# Patient Record
Sex: FEMALE | Race: BLACK OR AFRICAN AMERICAN | NOT HISPANIC OR LATINO | Employment: OTHER | URBAN - METROPOLITAN AREA
[De-identification: names, ages, dates, MRNs, and addresses within clinical notes are randomized per-mention and may not be internally consistent; named-entity substitution may affect disease eponyms.]

---

## 2018-03-17 ENCOUNTER — APPOINTMENT (EMERGENCY)
Dept: RADIOLOGY | Facility: HOSPITAL | Age: 78
DRG: 392 | End: 2018-03-17
Payer: MEDICARE

## 2018-03-17 ENCOUNTER — HOSPITAL ENCOUNTER (INPATIENT)
Facility: HOSPITAL | Age: 78
LOS: 1 days | Discharge: HOME | DRG: 392 | End: 2018-03-19
Attending: EMERGENCY MEDICINE | Admitting: INTERNAL MEDICINE
Payer: MEDICARE

## 2018-03-17 DIAGNOSIS — K21.00 GASTROESOPHAGEAL REFLUX DISEASE WITH ESOPHAGITIS: ICD-10-CM

## 2018-03-17 DIAGNOSIS — I10 HYPERTENSION: ICD-10-CM

## 2018-03-17 DIAGNOSIS — R07.9 CHEST PAIN: Primary | ICD-10-CM

## 2018-03-17 DIAGNOSIS — K20.90 ESOPHAGITIS: ICD-10-CM

## 2018-03-17 PROBLEM — K21.9 GERD (GASTROESOPHAGEAL REFLUX DISEASE): Status: ACTIVE | Noted: 2018-03-17

## 2018-03-17 PROBLEM — I16.0 HYPERTENSIVE URGENCY: Status: ACTIVE | Noted: 2018-03-17

## 2018-03-17 LAB
ALBUMIN SERPL BCP-MCNC: 3.7 G/DL (ref 3.5–5)
ALP SERPL-CCNC: 129 U/L (ref 46–116)
ALT SERPL W P-5'-P-CCNC: 19 U/L (ref 12–78)
ANION GAP SERPL CALCULATED.3IONS-SCNC: 7 MMOL/L (ref 4–13)
AST SERPL W P-5'-P-CCNC: 19 U/L (ref 5–45)
BASOPHILS # BLD AUTO: 0.02 THOUSANDS/ΜL (ref 0–0.1)
BASOPHILS NFR BLD AUTO: 0 % (ref 0–1)
BILIRUB SERPL-MCNC: 0.78 MG/DL (ref 0.2–1)
BUN SERPL-MCNC: 11 MG/DL (ref 5–25)
CALCIUM SERPL-MCNC: 9.2 MG/DL (ref 8.3–10.1)
CHLORIDE SERPL-SCNC: 105 MMOL/L (ref 100–108)
CO2 SERPL-SCNC: 29 MMOL/L (ref 21–32)
CREAT SERPL-MCNC: 0.85 MG/DL (ref 0.6–1.3)
EOSINOPHIL # BLD AUTO: 0.08 THOUSAND/ΜL (ref 0–0.61)
EOSINOPHIL NFR BLD AUTO: 1 % (ref 0–6)
ERYTHROCYTE [DISTWIDTH] IN BLOOD BY AUTOMATED COUNT: 14.3 % (ref 11.6–15.1)
GFR SERPL CREATININE-BSD FRML MDRD: 67 ML/MIN/1.73SQ M
GLUCOSE SERPL-MCNC: 112 MG/DL (ref 65–140)
HCT VFR BLD AUTO: 38.8 % (ref 34.8–46.1)
HGB BLD-MCNC: 12.4 G/DL (ref 11.5–15.4)
LIPASE SERPL-CCNC: 107 U/L (ref 73–393)
LYMPHOCYTES # BLD AUTO: 2.47 THOUSANDS/ΜL (ref 0.6–4.47)
LYMPHOCYTES NFR BLD AUTO: 28 % (ref 14–44)
MCH RBC QN AUTO: 27.7 PG (ref 26.8–34.3)
MCHC RBC AUTO-ENTMCNC: 32 G/DL (ref 31.4–37.4)
MCV RBC AUTO: 87 FL (ref 82–98)
MONOCYTES # BLD AUTO: 0.4 THOUSAND/ΜL (ref 0.17–1.22)
MONOCYTES NFR BLD AUTO: 5 % (ref 4–12)
NEUTROPHILS # BLD AUTO: 5.8 THOUSANDS/ΜL (ref 1.85–7.62)
NEUTS SEG NFR BLD AUTO: 66 % (ref 43–75)
NRBC BLD AUTO-RTO: 0 /100 WBCS
PLATELET # BLD AUTO: 318 THOUSANDS/UL (ref 149–390)
PMV BLD AUTO: 9.4 FL (ref 8.9–12.7)
POTASSIUM SERPL-SCNC: 3.4 MMOL/L (ref 3.5–5.3)
PROT SERPL-MCNC: 8.2 G/DL (ref 6.4–8.2)
RBC # BLD AUTO: 4.47 MILLION/UL (ref 3.81–5.12)
SODIUM SERPL-SCNC: 141 MMOL/L (ref 136–145)
TROPONIN I SERPL-MCNC: <0.02 NG/ML
TROPONIN I SERPL-MCNC: <0.02 NG/ML
WBC # BLD AUTO: 8.79 THOUSAND/UL (ref 4.31–10.16)

## 2018-03-17 PROCEDURE — 99285 EMERGENCY DEPT VISIT HI MDM: CPT

## 2018-03-17 PROCEDURE — 85025 COMPLETE CBC W/AUTO DIFF WBC: CPT | Performed by: EMERGENCY MEDICINE

## 2018-03-17 PROCEDURE — 71046 X-RAY EXAM CHEST 2 VIEWS: CPT

## 2018-03-17 PROCEDURE — 83690 ASSAY OF LIPASE: CPT | Performed by: EMERGENCY MEDICINE

## 2018-03-17 PROCEDURE — 93005 ELECTROCARDIOGRAM TRACING: CPT | Performed by: EMERGENCY MEDICINE

## 2018-03-17 PROCEDURE — 80053 COMPREHEN METABOLIC PANEL: CPT | Performed by: EMERGENCY MEDICINE

## 2018-03-17 PROCEDURE — 74175 CTA ABDOMEN W/CONTRAST: CPT

## 2018-03-17 PROCEDURE — 93005 ELECTROCARDIOGRAM TRACING: CPT

## 2018-03-17 PROCEDURE — 84484 ASSAY OF TROPONIN QUANT: CPT | Performed by: EMERGENCY MEDICINE

## 2018-03-17 PROCEDURE — 71275 CT ANGIOGRAPHY CHEST: CPT

## 2018-03-17 PROCEDURE — 84484 ASSAY OF TROPONIN QUANT: CPT | Performed by: INTERNAL MEDICINE

## 2018-03-17 PROCEDURE — 36415 COLL VENOUS BLD VENIPUNCTURE: CPT

## 2018-03-17 RX ORDER — ACETAMINOPHEN 325 MG/1
650 TABLET ORAL EVERY 6 HOURS PRN
Status: DISCONTINUED | OUTPATIENT
Start: 2018-03-17 | End: 2018-03-19 | Stop reason: HOSPADM

## 2018-03-17 RX ORDER — LABETALOL HYDROCHLORIDE 5 MG/ML
10 INJECTION, SOLUTION INTRAVENOUS ONCE
Status: COMPLETED | OUTPATIENT
Start: 2018-03-17 | End: 2018-03-17

## 2018-03-17 RX ORDER — PANTOPRAZOLE SODIUM 40 MG/1
40 TABLET, DELAYED RELEASE ORAL
Status: DISCONTINUED | OUTPATIENT
Start: 2018-03-18 | End: 2018-03-19 | Stop reason: HOSPADM

## 2018-03-17 RX ORDER — POTASSIUM CHLORIDE 20 MEQ/1
40 TABLET, EXTENDED RELEASE ORAL ONCE
Status: COMPLETED | OUTPATIENT
Start: 2018-03-17 | End: 2018-03-17

## 2018-03-17 RX ORDER — LABETALOL HYDROCHLORIDE 5 MG/ML
10 INJECTION, SOLUTION INTRAVENOUS EVERY 6 HOURS PRN
Status: DISCONTINUED | OUTPATIENT
Start: 2018-03-17 | End: 2018-03-19

## 2018-03-17 RX ORDER — LABETALOL HYDROCHLORIDE 5 MG/ML
10 INJECTION, SOLUTION INTRAVENOUS ONCE
Status: DISCONTINUED | OUTPATIENT
Start: 2018-03-17 | End: 2018-03-19

## 2018-03-17 RX ORDER — LISINOPRIL 10 MG/1
10 TABLET ORAL DAILY
Status: ON HOLD | COMMUNITY
End: 2018-03-19

## 2018-03-17 RX ORDER — MAGNESIUM HYDROXIDE/ALUMINUM HYDROXICE/SIMETHICONE 120; 1200; 1200 MG/30ML; MG/30ML; MG/30ML
20 SUSPENSION ORAL ONCE
Status: COMPLETED | OUTPATIENT
Start: 2018-03-17 | End: 2018-03-17

## 2018-03-17 RX ORDER — LISINOPRIL 10 MG/1
10 TABLET ORAL DAILY
Status: DISCONTINUED | OUTPATIENT
Start: 2018-03-18 | End: 2018-03-19 | Stop reason: HOSPADM

## 2018-03-17 RX ORDER — MAGNESIUM HYDROXIDE/ALUMINUM HYDROXICE/SIMETHICONE 120; 1200; 1200 MG/30ML; MG/30ML; MG/30ML
15 SUSPENSION ORAL EVERY 4 HOURS PRN
Status: DISCONTINUED | OUTPATIENT
Start: 2018-03-17 | End: 2018-03-19 | Stop reason: HOSPADM

## 2018-03-17 RX ORDER — NITROGLYCERIN 0.4 MG/1
0.4 TABLET SUBLINGUAL ONCE
Status: COMPLETED | OUTPATIENT
Start: 2018-03-17 | End: 2018-03-17

## 2018-03-17 RX ORDER — ONDANSETRON 2 MG/ML
4 INJECTION INTRAMUSCULAR; INTRAVENOUS EVERY 6 HOURS PRN
Status: DISCONTINUED | OUTPATIENT
Start: 2018-03-17 | End: 2018-03-19 | Stop reason: HOSPADM

## 2018-03-17 RX ADMIN — LIDOCAINE HYDROCHLORIDE 15 ML: 20 SOLUTION ORAL; TOPICAL at 17:02

## 2018-03-17 RX ADMIN — POTASSIUM CHLORIDE 40 MEQ: 1500 TABLET, EXTENDED RELEASE ORAL at 20:42

## 2018-03-17 RX ADMIN — ALUMINUM HYDROXIDE, MAGNESIUM HYDROXIDE, AND SIMETHICONE 20 ML: 200; 200; 20 SUSPENSION ORAL at 17:02

## 2018-03-17 RX ADMIN — LABETALOL 20 MG/4 ML (5 MG/ML) INTRAVENOUS SYRINGE 10 MG: at 19:18

## 2018-03-17 RX ADMIN — NITROGLYCERIN 0.4 MG: 0.4 TABLET SUBLINGUAL at 14:32

## 2018-03-17 RX ADMIN — IOHEXOL 100 ML: 350 INJECTION, SOLUTION INTRAVENOUS at 15:25

## 2018-03-17 NOTE — PROGRESS NOTES
[facility] Senior Admission Note   Unit/Bed # @DBLINK ([facility],[address])@ Encounter: [id number]  SOD Team A          Valery Cabral 78 y o  female [id number]       Patient seen and examined  Reviewed H&P per Dr Linda Means  Agree with the assessment and plan  Patient is a 72-year-old female with past medical history of hypertension  Patient supposedly be taking lisinopril however she is not compliant  She moved here from [place] at this time does not have a PCP  She is interested to establish care with Dr Kings Price  This morning patient started to have indigestion together with chest pain  She says that her indigestion usually resolves with aspirin but not at this time  At this time it was different and she was worried that it might be related to her heart  She came to the ED for evaluation  In the ED, EKG with nonspecific ST changes, CTA dissection protocol - There is no aortic dissection or aneurysm  There is no pericardial effusion  The esophagus appears abnormal with a thickened hazy appearing wall and surrounding mild adenopathy  This might indicate esophagitis which could be infectious or due to reflux  Esophageal neoplasm is not excluded  Further dedicated workup suggested  Colonic diverticulosis  Small hepatic hypodensities which are probably cysts  Labs - potassium 3 4 otherwise normal        Assessment/Plan: Active Problems:    Hypertensive urgency    Chest pain    Hypertension    GERD (gastroesophageal reflux disease)    Esophagitis       Chest pain most likely 2/2 GERD, indigestion, esophagitis vs  ACS rule out less likely musculoskeletal (patient nontender to chest palpation)  - CTA dissection protocol - There is no aortic dissection or aneurysm  There is no pericardial effusion  The esophagus appears abnormal with a thickened hazy appearing wall and surrounding mild adenopathy  This might indicate esophagitis which could be infectious or due to reflux  Esophageal neoplasm is not excluded    - Mylanta p r n  for indigestion  - follow-up on troponins, lipid panel, A1c, TSH, EKG in a m , telemetry monitoring    Hypertension - patient should be taking lisinopril as an outpatient, however she is noncompliant  - continue lisinopril 10 mg daily    Hypertensive urgency - on admission patient's /102  - labetalol p r n  for BP greater than 180    Esophagitis per CT - patient was started on Protonix 40 mg daily  - GI consulted, spoke with GI attending- Dr Dario Conklin, patient will be scheduled for EGD on Monday      Disposition:  OBSERVATION    Expected LOS: <2 Halle Georges MD

## 2018-03-17 NOTE — ED ATTENDING ATTESTATION
Chris Haro MD, saw and evaluated the patient  I have discussed the patient with the resident/non-physician practitioner and agree with the resident's/non-physician practitioner's findings, Plan of Care, and MDM as documented in the resident's/non-physician practitioner's note, except where noted  All available labs and Radiology studies were reviewed  At this point I agree with the current assessment done in the Emergency Department  I have conducted an independent evaluation of this patient a history and physical is as follows:      Critical Care Time  CritCare Time    Procedures     77 yo female c/o chest pain and high bp at home  Pt noncompliant with her meds  Pt states today she woke up and developed chest pain with radiation to jaw for a moment  No n/v/diaphoresis  No palpitations  Pt thinks it may be similar to her reflux  Pt took asa with worsening of pain  Pt with persistent pain  No headache, no abdominal pain, no urinary complaints  Quit smoking 5 years ago, pmh obese, htn, echo many years ago  Vss, afebrile, hypertensive, lungs mild crackles at base of lungs, rrr, abdomen soft nontender, no pedal edema  Cardiac workup, lipase, nitro, ekg, cxr

## 2018-03-17 NOTE — H&P
INTERNAL MEDICINE HISTORY AND PHYSICAL  CW2 210-02 SOD Team A    NAME: Valery Cabral  AGE: 78 y o  SEX: female  : 1940   MRN: [id number]  ENCOUNTER: [id number]    DATE: 3/17/2018  TIME: 6:55 PM    Primary Care Physician: No primary care provider on file  Admitting Provider: Janeth Negro MD    Chief complaint:  Chest pain and high blood pressure recorded at home    History of Present Illness     Valery Cabral is a 78 y o  female with past medical history of GERD and hypertension with medication noncompliance who woke up this morning developed chest pain with radiation to jaw  Patient has never felt these symptoms before  Patient notes that she had infrequent indigestion but never took medicines for that  Patient notes that she can walk only a few blocks before having to stop due to shortness of breath  Patient can climb stairs because of osteoarthritis in her knees  Patient did not take her blood pressure medication because she believes she did not needed  Patient had an echo done in 2016 unclear exactly what was noted but patient notes an enlarged heart  Patient measured her blood pressure at home and noted to be 483X systolic blood pressure  Patient denied any nausea, vomiting, diaphoresis, palpitations, headache, fevers, chills at that time  Patient took aspirin at home with no relief and persistent pain  Patient quit smoking 5 years ago  In the Ed:  - heart score ~4  - vitals - heart rate 70-80s, respiration rate 18, blood pressure 140s-200/90s, SpO2 96% +  Mylanta 1 time  Viscous Lidocaine  Sublingual nitro  CMP - potassium 3 4, alk-phos 129  CBC - largely within normal limits  - lipase within normal limits  - troponin x1 within normal limits  - EKG - poor EKG  Consider repeating  No signs of ACS at this moment  - CTA Dissection protocol - Soft tissues appeared to be thickened with possible surrounding mild adenopathy  Could not exclude esophageal neoplasm    Slight asymmetry of the inner left breast but patient reports to have had a history of breast surgery  Should be clarified with patient  Colonic diverticulosis  Small hepatic hypodensities with a probable cyst   - CXR - no acute cardiopulmonary disease    Review of Systems   Review of Systems   Constitutional: Negative for chills, fatigue and fever  HENT: Negative for drooling, facial swelling and sore throat  Eyes: Negative for visual disturbance  Respiratory: Positive for chest tightness  Negative for cough, choking, shortness of breath, wheezing and stridor  Cardiovascular: Negative for chest pain and leg swelling  Gastrointestinal: Negative for abdominal distention, abdominal pain, anal bleeding, blood in stool, constipation, diarrhea and nausea  Indigestion, reflux, worse when lying down   Genitourinary: Negative for difficulty urinating, dysuria and flank pain  Musculoskeletal: Negative for arthralgias  Skin: Negative  All other systems reviewed and are negative  Past Medical History     Past Medical History:   Diagnosis Date    Hypertension        Past Surgical History     Past Surgical History:   Procedure Laterality Date    BREAST LUMPECTOMY      DENTAL SURGERY      TONSILLECTOMY         Social History     History   Alcohol Use No     History   Drug Use No     History   Smoking Status    Former Smoker   Smokeless Tobacco    Never Used       Family History   History reviewed  No pertinent family history  Medications Prior to Admission     Prior to Admission medications    Medication Sig Start Date End Date Taking?  Authorizing Provider   lisinopril (ZESTRIL) 10 mg tablet Take 10 mg by mouth daily   Yes Historical Provider, MD       Allergies   No Known Allergies    Objective     Vitals:    03/17/18 1511 03/17/18 1615 03/17/18 1700 03/17/18 1748   BP: 146/99 167/78 162/95 (!) 200/95   BP Location: Right arm Right arm Right arm Right arm   Pulse: 78 84 76 86   Resp: 18 18 18 18   Temp: TempSrc:       SpO2: 98% 96% 97% 97%   Weight:       Height:         Body mass index is 29 86 kg/m²  No intake or output data in the 24 hours ending 03/17/18 1855  Invasive Devices     Peripheral Intravenous Line            Peripheral IV 03/17/18 Left Antecubital less than 1 day                Physical Exam  Physical Exam   Constitutional: She is oriented to person, place, and time  She appears well-nourished  HENT:   Head: Normocephalic and atraumatic  Eyes: EOM are normal  Pupils are equal, round, and reactive to light  Cardiovascular: Normal rate and regular rhythm  Murmur heard  Pulmonary/Chest: Effort normal and breath sounds normal  No respiratory distress  She has no wheezes  She has no rales  She exhibits tenderness (mid sternal, non radiating)  Abdominal: Soft  Bowel sounds are normal  She exhibits no distension  There is no tenderness  There is no rebound  Musculoskeletal: She exhibits no edema  Neurological: She is alert and oriented to person, place, and time  No cranial nerve deficit  Skin: Skin is warm  No rash noted  No erythema  Psychiatric: She has a normal mood and affect  Her behavior is normal    Vitals reviewed  Lab Results: I have personally reviewed pertinent reports      CBC:     Results from last 7 days  Lab Units 03/17/18  1405   WBC Thousand/uL 8 79   RBC Million/uL 4 47   HEMOGLOBIN g/dL 12 4   HEMATOCRIT % 38 8   MCV fL 87   MCH pg 27 7   MCHC g/dL 32 0   RDW % 14 3   MPV fL 9 4   PLATELETS Thousands/uL 318   NRBC AUTO /100 WBCs 0   NEUTROS PCT % 66   LYMPHS PCT % 28   MONOS PCT % 5   EOS PCT % 1   BASOS PCT % 0   NEUTROS ABS Thousands/µL 5 80   LYMPHS ABS Thousands/µL 2 47   MONOS ABS Thousand/µL 0 40   EOS ABS Thousand/µL 0 08   , Chemistry Profile:     Results from last 7 days  Lab Units 03/17/18  1405   SODIUM mmol/L 141   POTASSIUM mmol/L 3 4*   CHLORIDE mmol/L 105   CO2 mmol/L 29   ANION GAP mmol/L 7   BUN mg/dL 11   CREATININE mg/dL 0 85   GLUCOSE RANDOM mg/dL 112   CALCIUM mg/dL 9 2   AST U/L 19   ALT U/L 19   ALK PHOS U/L 129*   TOTAL PROTEIN g/dL 8 2   BILIRUBIN TOTAL mg/dL 0 78   EGFR ml/min/1 73sq m 67   , Coagulation Studies:   , Cardiac Studies:     Results from last 7 days  Lab Units 03/17/18  1405   TROPONIN I ng/mL <0 02   , Additional Labs:     Results from last 7 days  Lab Units 03/17/18  1405   LIPASE u/L 107   , iSTAT CHEM 8:     Results from last 7 days  Lab Units 03/17/18  1405   EGFR ml/min/1 73sq m 67   GLUCOSE RANDOM mg/dL 112   HEMOGLOBIN g/dL 12 4   , ABG:   , Toxicology:   , Last A1C/Lipid Panel/Thyroid Panel: No results found for: HGBA1C, TRIG, CHOL, HDL, LDLCALC, [id number], T3FREE, U3ZAGTK, FREET4    Imaging: I have personally reviewed pertinent films in PACS  X-ray Chest 2 Views    Result Date: 3/17/2018  Narrative: CHEST INDICATION:   chest pain  Hypertension COMPARISON:  None EXAM PERFORMED/VIEWS:  XR CHEST PA & LATERAL  The frontal view was performed utilizing dual energy radiographic technique  FINDINGS: Cardiomediastinal silhouette appears unremarkable  The lungs are clear  No pneumothorax or pleural effusion  Osseous structures appear within normal limits for patient age  Impression: No acute cardiopulmonary disease  Workstation performed: [id number]     Cta Dissection Protocol Chest And Abdomen    Result Date: 3/17/2018  Narrative: CTA - CHEST AND ABDOMEN  - WITHOUT AND WITH IV CONTRAST INDICATION:   htn, cp concern for pericardial effusion on bedisde echo  COMPARISON:  None  TECHNIQUE: CT examination of the chest and abdomen was performed both prior to and after the administration of intravenous contrast   Thin section angiographic arterial phase post contrast technique was used in order to evaluate for aortic dissection  3D reformatted images and volume rendering were performed on an independent workstation    Additionally, axial, sagittal, and coronal 2D reformatted images were created from the source data and submitted for interpretation  Radiation dose length product (DLP) for this visit:  2134 64 mGy-cm   This examination, like all CT scans performed in the [facility], was performed utilizing techniques to minimize radiation dose exposure, including the use of iterative reconstruction and automated exposure control  IV Contrast:  100 mL of iohexol (OMNIPAQUE) Enteric Contrast: Enteric contrast was not administered  FINDINGS: AORTA: There is no aortic dissection or intramural hematoma  There is no aortic aneurysm  There is atherosclerotic calcification of the aorta wall  Caliber is normal   No visible aortic valvular calcifications  Branch vessels at the arch and the major  branch vessels in the abdomen appear patent and within normal limits of caliber  There appear to be 2 left renal arteries and one right renal artery  CHEST LUNGS:  There is very mild pulmonary emphysema  There is a 2 or 3 mm subpleural nodule in the right upper lobe on image 25 series 3  There is a 1 mm nodule on image 30 in the right upper lobe laterally  There is a 1 or 2 mm nodule in the right upper lobe on image 37 anteriorly  There are no suspicious infiltrates to suggest pneumonia  No visible airway obstructions  PLEURA:  Unremarkable  HEART/PULMONARY ARTERIAL TREE:  There is no pericardial effusion  Heart size is within normal limits  The pulmonary arterial tree appears within normal limits  MEDIASTINUM AND JUAN:  The esophagus appears thick-walled  Correlate for any history of reflux or esophagitis  The wall also has a slightly hazy outer margin and there is minimal prominence of some lymph nodes around the esophagus  While probably less  likely than esophagitis, the possibility of esophageal neoplasm cannot be excluded based on this exam and further workup is therefore suggested    Upper endoscopy would probably be the most sensitive test  CHEST WALL AND LOWER NECK:   There seems to be slight asymmetric prominence of tissue within the medial left breast compared with the right, without any definitive evidence of focal mass  Patient does not appear to have any prior mammograms at [facility]  If she has not had at least for yearly screening study, mammography is recommended on a nonemergent basis  ABDOMEN LIVER/BILIARY TREE:  Small round low-density hepatic lesions in the left and right lobes have appearance favoring cysts  The left-sided finding is 11 mm and the right lobe finding is 7-8 mm  GALLBLADDER:  No calcified gallstones  No pericholecystic inflammatory change  SPLEEN:  Unremarkable  PANCREAS:  Unremarkable  ADRENAL GLANDS:  There is minimal nodular thickening of the left adrenal gland  It measures up to 8 mm diameter  The density is -4 Hounsfield units on the noncontrast portion of the exam, diagnostic of benign adenoma  KIDNEYS/URETERS:  Unremarkable  No hydronephrosis  VISUALIZED STOMACH AND BOWEL:  There is colonic diverticulosis  VISUALIZED ABDOMINOPELVIC CAVITY:  No ascites or free intraperitoneal air  No lymphadenopathy  ABDOMINAL WALL:  Unremarkable  OSSEOUS STRUCTURES:  No acute fracture or destructive osseous lesion  Impression: There is no aortic dissection or aneurysm  There is no pericardial effusion  The esophagus appears abnormal with a thickened hazy appearing wall and surrounding mild adenopathy  This might indicate esophagitis which could be infectious or due to reflux  Esophageal neoplasm is not excluded  Further dedicated workup suggested  Slight asymmetry of the inner left breast   Consider nonemergent follow-up mammogram  Colonic diverticulosis  Small hepatic hypodensities which are probably cysts  Workstation performed: [id number]       Urinalysis:       Invalid input(s): URIBILINOGEN     Urine Micro:        EKG, Pathology, and Other Studies: I have personally reviewed pertinent reports        Medications given in Emergency Department     Medication Administration - last 24 hours from 03/16/2018 1855 to 03/17/2018 1855       Date/Time Order Dose Route Action Action by     03/17/2018 1432 nitroglycerin (NITROSTAT) SL tablet 0 4 mg 0 4 mg Sublingual Given Louie Garcia RN     03/17/2018 1514 labetalol (NORMODYNE) injection 10 mg 0 mg Intravenous Hold Louie Garcia RN     03/17/2018 1525 iohexol (OMNIPAQUE) 350 MG/ML injection (MULTI-DOSE) 100 mL 100 mL Intravenous Given Breanne Flatten     03/17/2018 1702 lidocaine viscous (XYLOCAINE) 2 % mucosal solution 15 mL 15 mL Swish & Spit Given Louie Garcia RN     03/17/2018 1702 aluminum-magnesium hydroxide-simethicone (MYLANTA) 200-200-20 mg/5 mL oral suspension 20 mL 20 mL Oral Given Louie Garcia RN          Assessment and Plan   There is no problem list on file for this patient  Chest pain - likely secondary to GERD/esophagitis  Troponin x1 negative  EKG although poor was not specific for ACS  Cta dissection protocol - no aortic dissection or an aneurysm  No pericardial effusion  Soft tissues appeared to be thickened with possible surrounding mild adenopathy  Could not exclude esophageal neoplasm  Slight asymmetry of the inner left breast but patient reports to have had a history of breast surgery  Should be clarified with patient  Colonic diverticulosis    Small hepatic hypodensities with a probable cyst   - troponin repeat  - consider repeat Ekg  - nitrostat  - protonix 40mg daily, mylanta prn  - zofran for nausea  - mylanta prn  - f/u CBC, CMP, mg, phosphorus  - spoke to Gi, regarding endoscopy and will scope on Monday  - NPO tomorrow night    Hypertensive Urgency - non compliant with   - continue with home lisinopril 10 mg daily  - labetalol Q 6 p r n  for blood pressure greater than 180    Other  - TSH  - Hga1c  - Kdur 40 1x    Code Status: Level 1 - Full Code  VTE Pharmacologic Prophylaxis: Enoxaparin (Lovenox)   VTE Mechanical Prophylaxis: sequential compression device  Admission Status: OBSERVATION    Admission Time  I spent 30 minutes admitting the patient  This involved direct patient contact where I performed a full history and physical, reviewing previous records, and reviewing laboratory and other diagnostic studies      Kaleb Lyle MD  Internal Medicine  PGY-1

## 2018-03-17 NOTE — ED PROVIDER NOTES
History  Chief Complaint   Patient presents with    Chest Pain     Patient reports sternal chest pain starting this morning that woke her up  Patient is 77 yo woman with a pmh of htn who presents for evaluation of chest pain  She states that the pain started at 7:15 this am after she woke up  The pain is located retrosternal  It radiated briefly to the left jaw but subsided  She took a 324 aspirin as she was concerned for a heart attack  She chewed and swallowed the pill  She states that the pain worsened at this time  She has had reflux in the past and states that this pain is similar in character  She came to the ER today because she has not had symptoms this severe in the past  She denies sob, nausea/vomiting, diaphoresis, fever, chills  She was at rest when the symptoms started  She denies recent illness, diarrhea, headache  Patient is former smoker, she has htn that is poorly controlled  She hasn't seen a doctor on ~2 years  History provided by:  Patient and relative      Prior to Admission Medications   Prescriptions Last Dose Informant Patient Reported? Taking?   lisinopril (ZESTRIL) 10 mg tablet 3/17/2018 at 1100  Yes Yes   Sig: Take 10 mg by mouth daily      Facility-Administered Medications: None       Past Medical History:   Diagnosis Date    Esophagitis     GERD (gastroesophageal reflux disease)     Hypertension        Past Surgical History:   Procedure Laterality Date    BREAST LUMPECTOMY      DENTAL SURGERY      ESOPHAGOGASTRODUODENOSCOPY N/A 3/19/2018    Procedure: ESOPHAGOGASTRODUODENOSCOPY (EGD); Surgeon: Lien Anthony MD;  Location: [facility]; Service: Gastroenterology    TONSILLECTOMY         Family History   Problem Relation Age of Onset    No Known Problems Mother     No Known Problems Father      I have reviewed and agree with the history as documented      Social History   Substance Use Topics    Smoking status: Former Smoker    Smokeless tobacco: Never Used Comment: quit 5 years ago    Alcohol use No        Review of Systems   Constitutional: Negative for appetite change, chills, diaphoresis, fatigue and fever  HENT: Negative for congestion, rhinorrhea and sore throat  Respiratory: Negative for cough, shortness of breath, wheezing and stridor  Cardiovascular: Positive for chest pain  Negative for palpitations and leg swelling  Gastrointestinal: Negative for abdominal distention, abdominal pain, constipation, diarrhea, nausea and vomiting  Endocrine: Negative for polydipsia and polyuria  Genitourinary: Negative for dysuria and hematuria  Musculoskeletal: Negative for back pain, neck pain and neck stiffness  Skin: Negative for pallor, rash and wound  Neurological: Negative for dizziness, light-headedness and headaches  Psychiatric/Behavioral: Negative for behavioral problems and confusion  Physical Exam  ED Triage Vitals   Temperature Pulse Respirations Blood Pressure SpO2   03/17/18 1356 03/17/18 1356 03/17/18 1356 03/17/18 1356 03/17/18 1356   97 6 °F (36 4 °C) 104 18 (!) 211/102 99 %      Temp Source Heart Rate Source Patient Position - Orthostatic VS BP Location FiO2 (%)   03/17/18 1356 03/17/18 1356 03/17/18 1356 03/17/18 1356 --   Tympanic Monitor Lying Right arm       Pain Score       03/17/18 1353       6           Orthostatic Vital Signs  Vitals:    03/19/18 0722 03/19/18 1424 03/19/18 1539 03/19/18 1552   BP: (!) 180/90 (!) 180/84 95/50 122/64   Pulse: 73 78 81 70   Patient Position - Orthostatic VS: Sitting          Physical Exam   Constitutional: She is oriented to person, place, and time  She appears well-developed and well-nourished  No distress  Obese woman in no acute distress  HENT:   Head: Normocephalic and atraumatic  Mouth/Throat: Oropharynx is clear and moist and mucous membranes are normal    Eyes: Conjunctivae are normal  No scleral icterus  Neck: Normal range of motion  Neck supple     Cardiovascular: Normal rate, regular rhythm, normal heart sounds and intact distal pulses  Exam reveals no gallop  No murmur heard  Pulmonary/Chest: Effort normal  No respiratory distress  She has no wheezes  She has rales (mild bibasilar)  She exhibits no tenderness  Abdominal: Soft  Bowel sounds are normal  She exhibits no distension  There is no tenderness  Musculoskeletal: Normal range of motion  She exhibits no edema, tenderness or deformity  Neurological: She is alert and oriented to person, place, and time  Skin: Skin is warm and dry  No rash noted  She is not diaphoretic  No erythema  No pallor  Psychiatric: She has a normal mood and affect  Her behavior is normal    Nursing note and vitals reviewed        ED Medications  Medications   nitroglycerin (NITROSTAT) SL tablet 0 4 mg (0 4 mg Sublingual Given 3/17/18 1432)   iohexol (OMNIPAQUE) 350 MG/ML injection (MULTI-DOSE) 100 mL (100 mL Intravenous Given 3/17/18 1525)   lidocaine viscous (XYLOCAINE) 2 % mucosal solution 15 mL (15 mL Swish & Spit Given 3/17/18 1702)   aluminum-magnesium hydroxide-simethicone (MYLANTA) 200-200-20 mg/5 mL oral suspension 20 mL (20 mL Oral Given 3/17/18 1702)   labetalol (NORMODYNE) injection 10 mg (10 mg Intravenous Given 3/17/18 1918)   potassium chloride (K-DUR,KLOR-CON) CR tablet 40 mEq (40 mEq Oral Given 3/17/18 2042)       Diagnostic Studies  Results Reviewed     Procedure Component Value Units Date/Time    CBC and differential [[id number]]  (Normal) Collected:  03/17/18 1405    Lab Status:  Final result Specimen:  Blood from Arm, Left Updated:  03/17/18 1510     WBC 8 79 Thousand/uL      RBC 4 47 Million/uL      Hemoglobin 12 4 g/dL      Hematocrit 38 8 %      MCV 87 fL      MCH 27 7 pg      MCHC 32 0 g/dL      RDW 14 3 %      MPV 9 4 fL      Platelets 875 Thousands/uL      nRBC 0 /100 WBCs      Neutrophils Relative 66 %      Lymphocytes Relative 28 %      Monocytes Relative 5 %      Eosinophils Relative 1 %      Basophils Relative 0 %      Neutrophils Absolute 5 80 Thousands/µL      Lymphocytes Absolute 2 47 Thousands/µL      Monocytes Absolute 0 40 Thousand/µL      Eosinophils Absolute 0 08 Thousand/µL      Basophils Absolute 0 02 Thousands/µL     Comprehensive metabolic panel [[id number]]  (Abnormal) Collected:  03/17/18 1405    Lab Status:  Final result Specimen:  Blood from Arm, Left Updated:  03/17/18 1449     Sodium 141 mmol/L      Potassium 3 4 (L) mmol/L      Chloride 105 mmol/L      CO2 29 mmol/L      Anion Gap 7 mmol/L      BUN 11 mg/dL      Creatinine 0 85 mg/dL      Glucose 112 mg/dL      Calcium 9 2 mg/dL      AST 19 U/L      ALT 19 U/L      Alkaline Phosphatase 129 (H) U/L      Total Protein 8 2 g/dL      Albumin 3 7 g/dL      Total Bilirubin 0 78 mg/dL      eGFR 67 ml/min/1 73sq m     Narrative:         National Kidney Disease Education Program recommendations are as follows:  GFR calculation is accurate only with a steady state creatinine  Chronic Kidney disease less than 60 ml/min/1 73 sq  meters  Kidney failure less than 15 ml/min/1 73 sq  meters  Lipase [[id number]]  (Normal) Collected:  03/17/18 1405    Lab Status:  Final result Specimen:  Blood from Arm, Left Updated:  03/17/18 1449     Lipase 107 u/L     Troponin I [[id number]]  (Normal) Collected:  03/17/18 1405    Lab Status:  Final result Specimen:  Blood from Arm, Left Updated:  03/17/18 1448     Troponin I <0 02 ng/mL     Narrative:         [employer] Chemistry analyzer 99% cutoff is > 0 04 ng/mL in network labs    o cTnI 99% cutoff is useful only when applied to patients in the clinical setting of myocardial ischemia  o cTnI 99% cutoff should be interpreted in the context of clinical history, ECG findings and possibly cardiac imaging to establish correct diagnosis  o cTnI 99% cutoff may be suggestive but clearly not indicative of a coronary event without the clinical setting of myocardial ischemia                   CTA dissection protocol chest and abdomen   Final Result by Taiwo Wright MD (03/17 7725)      There is no aortic dissection or aneurysm  There is no pericardial effusion  The esophagus appears abnormal with a thickened hazy appearing wall and surrounding mild adenopathy  This might indicate esophagitis which could be infectious or due to reflux  Esophageal neoplasm is not excluded  Further dedicated workup suggested  Slight asymmetry of the inner left breast   Consider nonemergent follow-up mammogram       Colonic diverticulosis  Small hepatic hypodensities which are probably cysts  Workstation performed: [id number]         X-ray chest 2 views   Final Result by Merna Chung MD (03/17 1724)      No acute cardiopulmonary disease              Workstation performed: [id number]               Procedures  ECG 12 Lead Documentation  Date/Time: 3/17/2018 3:00 PM  Performed by: Elvis Lamb  Authorized by: Ksenia Sanford     Indications / Diagnosis:  Chest pain  ECG reviewed by me, the ED Provider: yes    Patient location:  ED  Previous ECG:     Previous ECG:  Unavailable    Comparison to cardiac monitor: Yes    Interpretation:     Interpretation: non-specific    Rate:     ECG rate:  88  Rhythm:     Rhythm: A-V block    Ectopy:     Ectopy: none    QRS:     QRS axis:  Normal  Conduction:     Conduction: abnormal      Abnormal conduction: 1st degree    ST segments:     ST segments:  Non-specific  T waves:     T waves: normal            Phone Consults  ED Phone Contact    ED Course  ED Course          HEART Risk Score    Flowsheet Row Most Recent Value   History  1 Filed at: 03/17/2018 1422   ECG  1 Filed at: 03/17/2018 1422   Age  2 Filed at: 03/17/2018 1422   Risk Factors  1 Filed at: 03/17/2018 1422   Troponin  No data   Heart Score Risk Calculator   History  1 Filed at: 03/17/2018 1422   ECG  1 Filed at: 03/17/2018 1422   Age  2 Filed at: 03/17/2018 1422   Risk Factors  1 Filed at: 03/17/2018 1422   Troponin  No data   HEART Score  No data   HEART Score  No data        Identification of Seniors at Risk    Flowsheet Row Most Recent Value   (ISAR) Identification of Seniors at Risk   Before the illness or injury that brought you to the Emergency, did you need someone to help you on a regular basis? 0 Filed at: 03/17/2018 1356   In the last 24 hours, have you needed more help than usual?  0 Filed at: 03/17/2018 1356   Have you been hospitalized for one or more nights during the past 6 months? 0 Filed at: 03/17/2018 1356   In general, do you see well?  0 Filed at: 03/17/2018 1356   In general, do you have serious problems with your memory? 0 Filed at: 03/17/2018 1356   Do you take more than three different medications every day? 1 Filed at: 03/17/2018 1356   ISAR Score  1 Filed at: 03/17/2018 1356                          MDM  Number of Diagnoses or Management Options  Chest pain: new and requires workup  Esophagitis: new and requires workup  Hypertension: new and requires workup  Diagnosis management comments: 79 yo woman presents with retrosternal chest pain  Patient took aspirin prior to presentation presenting with worsening of pain  Patient hypertensive to 435 systolic on exam  Will give 0 4 SL nitroglycerin to treat htn as well possible anginal pain  Will obtain cbc, cmp, lipase, ekg, cxr, troponin  Ddx includes ACS, dissection ,esophageal etiologies  Will obtain CTA dissection study  Study shows esophageal wall thickening consistent with esophagitis & cannot rule out esophageal malignancy  EKG, troponin, cxr unremarkable  This is likely esophageal in nature but patient has a heart score of 5 with poor medication compliance  Patient will be admitted to [facility] for ACS rule out and further work-up for esophagitis  Patient given lidocaine/mylanta GI cocktail with mild resolution of symptoms          Amount and/or Complexity of Data Reviewed  Clinical lab tests: ordered and reviewed  Tests in the radiology section of CPT®: ordered and reviewed  Tests in the medicine section of CPT®: ordered and reviewed  Decide to obtain previous medical records or to obtain history from someone other than the patient: yes  Obtain history from someone other than the patient: yes  Review and summarize past medical records: yes  Independent visualization of images, tracings, or specimens: yes    Risk of Complications, Morbidity, and/or Mortality  Presenting problems: low  Diagnostic procedures: minimal  Management options: minimal    Patient Progress  Patient progress: stable    CritCare Time    Disposition  Final diagnoses:   Chest pain   Hypertension   Esophagitis     Time reflects when diagnosis was documented in both MDM as applicable and the Disposition within this note     Time User Action Codes Description Comment    3/17/2018  5:37 PM Celine Vuong Add [R07 9] Chest pain     3/17/2018  5:37 PM Celine Vuong Add [I10] Hypertension     3/17/2018  7:18 PM Anne Castellani Modify [R07 9] Chest pain     3/17/2018  7:18 PM Havranova, [address] Hypertension     3/17/2018  7:18 PM Anne Castellani Add [K20 9] Esophagitis     3/17/2018  7:18 PM Anne Castellani Modify [R07 9] Chest pain     3/17/2018  7:18 PM Havranova, [address] Hypertension     3/17/2018  7:18 PM Anne Castellani Modify [R07 9] Chest pain     3/17/2018  7:18 PM Havranova, [address] Hypertension     3/19/2018  9:08 AM Elsa Antelope Add [K21 0] Gastroesophageal reflux disease with esophagitis     3/19/2018  9:08 AM Elsa Antelope Modify [K21 0] Gastroesophageal reflux disease with esophagitis       ED Disposition     ED Disposition Condition Comment    Admit  Case was discussed with [facility]  and the patient's admission status was agreed to be Admission Status: observation status to the service of Dr Luke Collet          Follow-up Information     Follow up With Specialties Details Why Contact Ross Rider MD Internal Medicine Schedule an appointment as soon as possible for a visit in 1 week(s)  [address] [facility]  [phone]          Discharge Medication List as of 3/19/2018  4:57 PM      START taking these medications    Details   amLODIPine (NORVASC) 5 mg tablet Take 1 tablet (5 mg total) by mouth every morning, Starting Mon 3/19/2018, Print      pantoprazole (PROTONIX) 40 mg tablet Take 1 tablet (40 mg total) by mouth daily in the early morning, Starting Tue 3/20/2018, Print         CONTINUE these medications which have CHANGED    Details   lisinopril (ZESTRIL) 10 mg tablet Take 1 tablet (10 mg total) by mouth daily with dinner, Starting Mon 3/19/2018, Print           No discharge procedures on file  ED Provider  Attending physically available and evaluated Gaurav Loyola I managed the patient along with the ED Attending      Electronically Signed by         Ana Garcia MD  03/21/18 1200

## 2018-03-18 LAB
ALBUMIN SERPL BCP-MCNC: 3.3 G/DL (ref 3.5–5)
ALP SERPL-CCNC: 109 U/L (ref 46–116)
ALT SERPL W P-5'-P-CCNC: 15 U/L (ref 12–78)
ANION GAP SERPL CALCULATED.3IONS-SCNC: 7 MMOL/L (ref 4–13)
AST SERPL W P-5'-P-CCNC: 14 U/L (ref 5–45)
ATRIAL RATE: 74 BPM
ATRIAL RATE: 88 BPM
BILIRUB SERPL-MCNC: 0.91 MG/DL (ref 0.2–1)
BUN SERPL-MCNC: 9 MG/DL (ref 5–25)
CALCIUM SERPL-MCNC: 9.2 MG/DL (ref 8.3–10.1)
CHLORIDE SERPL-SCNC: 104 MMOL/L (ref 100–108)
CHOLEST SERPL-MCNC: 226 MG/DL (ref 50–200)
CO2 SERPL-SCNC: 28 MMOL/L (ref 21–32)
CREAT SERPL-MCNC: 0.85 MG/DL (ref 0.6–1.3)
ERYTHROCYTE [DISTWIDTH] IN BLOOD BY AUTOMATED COUNT: 14.3 % (ref 11.6–15.1)
EST. AVERAGE GLUCOSE BLD GHB EST-MCNC: 88 MG/DL
GFR SERPL CREATININE-BSD FRML MDRD: 67 ML/MIN/1.73SQ M
GLUCOSE SERPL-MCNC: 94 MG/DL (ref 65–140)
HBA1C MFR BLD: 4.7 % (ref 4.2–6.3)
HCT VFR BLD AUTO: 34.7 % (ref 34.8–46.1)
HDLC SERPL-MCNC: 49 MG/DL (ref 40–60)
HGB BLD-MCNC: 10.8 G/DL (ref 11.5–15.4)
LDLC SERPL CALC-MCNC: 159 MG/DL (ref 0–100)
MAGNESIUM SERPL-MCNC: 2.2 MG/DL (ref 1.6–2.6)
MCH RBC QN AUTO: 27.1 PG (ref 26.8–34.3)
MCHC RBC AUTO-ENTMCNC: 31.1 G/DL (ref 31.4–37.4)
MCV RBC AUTO: 87 FL (ref 82–98)
P AXIS: 45 DEGREES
P AXIS: 64 DEGREES
PHOSPHATE SERPL-MCNC: 4.1 MG/DL (ref 2.3–4.1)
PLATELET # BLD AUTO: 288 THOUSANDS/UL (ref 149–390)
PMV BLD AUTO: 9.2 FL (ref 8.9–12.7)
POTASSIUM SERPL-SCNC: 3.7 MMOL/L (ref 3.5–5.3)
PR INTERVAL: 220 MS
PR INTERVAL: 222 MS
PROT SERPL-MCNC: 7.2 G/DL (ref 6.4–8.2)
QRS AXIS: 19 DEGREES
QRS AXIS: 40 DEGREES
QRSD INTERVAL: 70 MS
QRSD INTERVAL: 80 MS
QT INTERVAL: 330 MS
QT INTERVAL: 412 MS
QTC INTERVAL: 399 MS
QTC INTERVAL: 457 MS
RBC # BLD AUTO: 3.99 MILLION/UL (ref 3.81–5.12)
SODIUM SERPL-SCNC: 139 MMOL/L (ref 136–145)
T WAVE AXIS: 61 DEGREES
T WAVE AXIS: 86 DEGREES
TRIGL SERPL-MCNC: 88 MG/DL
TROPONIN I SERPL-MCNC: <0.02 NG/ML
TSH SERPL DL<=0.05 MIU/L-ACNC: 1.25 UIU/ML (ref 0.36–3.74)
VENTRICULAR RATE: 74 BPM
VENTRICULAR RATE: 88 BPM
WBC # BLD AUTO: 7.51 THOUSAND/UL (ref 4.31–10.16)

## 2018-03-18 PROCEDURE — 93005 ELECTROCARDIOGRAM TRACING: CPT | Performed by: INTERNAL MEDICINE

## 2018-03-18 PROCEDURE — 84100 ASSAY OF PHOSPHORUS: CPT | Performed by: INTERNAL MEDICINE

## 2018-03-18 PROCEDURE — 84484 ASSAY OF TROPONIN QUANT: CPT | Performed by: INTERNAL MEDICINE

## 2018-03-18 PROCEDURE — 93010 ELECTROCARDIOGRAM REPORT: CPT | Performed by: INTERNAL MEDICINE

## 2018-03-18 PROCEDURE — 80053 COMPREHEN METABOLIC PANEL: CPT | Performed by: INTERNAL MEDICINE

## 2018-03-18 PROCEDURE — 83036 HEMOGLOBIN GLYCOSYLATED A1C: CPT | Performed by: INTERNAL MEDICINE

## 2018-03-18 PROCEDURE — 80061 LIPID PANEL: CPT | Performed by: INTERNAL MEDICINE

## 2018-03-18 PROCEDURE — 85027 COMPLETE CBC AUTOMATED: CPT | Performed by: INTERNAL MEDICINE

## 2018-03-18 PROCEDURE — 84443 ASSAY THYROID STIM HORMONE: CPT | Performed by: INTERNAL MEDICINE

## 2018-03-18 PROCEDURE — 99204 OFFICE O/P NEW MOD 45 MIN: CPT | Performed by: INTERNAL MEDICINE

## 2018-03-18 PROCEDURE — 83735 ASSAY OF MAGNESIUM: CPT | Performed by: INTERNAL MEDICINE

## 2018-03-18 RX ADMIN — LISINOPRIL 10 MG: 10 TABLET ORAL at 08:26

## 2018-03-18 RX ADMIN — ENOXAPARIN SODIUM 40 MG: 40 INJECTION SUBCUTANEOUS at 08:26

## 2018-03-18 RX ADMIN — PANTOPRAZOLE SODIUM 40 MG: 40 TABLET, DELAYED RELEASE ORAL at 05:38

## 2018-03-18 NOTE — SOCIAL WORK
CM met w/pt @ bedside to discuss CM role and possible d/c needs  Pt stated that she was independent in ADLs PTA  Lives with  and children in 1 fl house; 1 [address]  Denies DME  No hx HHC or inpatient rehab  No inpatient behavioral health/substance abuse treatment  Preferred pharmacy is [address]  Pt states family will drive home @ discharge  CM reviewed d/c planning process including the following: identifying help at home, patient preference for d/c planning needs, Discharge Lounge, [facility] Meds to Bed program, availability of treatment team to discuss questions or concerns patient and/or family may have regarding understanding medications and recognizing signs and symptoms once discharged  CM also encouraged patient to follow up with all recommended appointments after discharge  Patient advised of importance for patient and family to participate in managing patients medical well being

## 2018-03-18 NOTE — CONSULTS
Consultation - [facility]  Gaurav Loyola 78 y o  female MRN: [id number]  Unit/Bed#: CW2 210-02 Encounter: [id number]        ASSESSMENT/PLAN:   1  Atypical chest pain/abnormal CT   -she presented to the hospital with chest pain and was evaluated for cardiac source but troponins and telemetry were negative    -she had a CT scan that showed a thickened he has the appearance to the esophageal wall with surrounding mild adenopathy which might indicate esophagitis however esophageal neoplasm is not excluded    -she had a slight decrease in her hemoglobin but has no signs of overt GI bleeding and this appears to be at least somewhat delutional    -recommend upper endoscopy to evaluate the abnormal area of her esophagus, she is agreeable and will plan for this tomorrow  -NPO after midnight   -continue to monitor her bowel movements for color and consistency and continue to monitor hemoglobin, transfuse as necessary  -begin Protonix 40 mg daily    Inpatient consult to gastroenterology  Consult performed by: Harvey Hsu  Consult ordered by: Alexa Burnett          Reason for Consult / Principal Problem: Chest pain    HPI: Gaurav Loyola is a 78y o  year old female with a PMH of hypertension presents to the hospital for chest pain, she was worked up for cardiac cause but troponins were negative and telemetry negative for any acute events  She had a CT scan that showed abnormal appearing esophagus with surrounding mild adenopathy  She reports that she is currently completely asymptomatic and denies any nausea, vomiting, difficulty swallowing, abdominal pain, heartburn, change in appetite, unintended weight loss, change in bowel habits, hematochezia, melena or jaundice  She states that she has never had a colonoscopy or upper endoscopy  She denies family history of GI malignancy      Review of Systems: as per HPI  Review of Systems   Constitutional: Negative for activity change, appetite change, chills, fatigue, fever and unexpected weight change  HENT: Negative for mouth sores, sore throat and trouble swallowing  Respiratory: Negative for shortness of breath  Cardiovascular: Positive for chest pain  Gastrointestinal: Negative for abdominal distention, abdominal pain, blood in stool, constipation, diarrhea, nausea and vomiting  Skin: Negative for color change, pallor, rash and wound  Neurological: Negative for tremors and syncope  All other systems reviewed and are negative  Historical Information   Past Medical History:   Diagnosis Date    Hypertension      Past Surgical History:   Procedure Laterality Date    BREAST LUMPECTOMY      DENTAL SURGERY      TONSILLECTOMY       Social History   History   Alcohol Use No     History   Drug Use No     History   Smoking Status    Former Smoker   Smokeless Tobacco    Never Used     Comment: quit 5 years ago     Family History   Problem Relation Age of Onset    No Known Problems Mother     No Known Problems Father        Meds/Allergies     Prescriptions Prior to Admission   Medication    lisinopril (ZESTRIL) 10 mg tablet     Current Facility-Administered Medications   Medication Dose Route Frequency    acetaminophen (TYLENOL) tablet 650 mg  650 mg Oral Q6H PRN    aluminum-magnesium hydroxide-simethicone (MYLANTA) 200-200-20 mg/5 mL oral suspension 15 mL  15 mL Oral Q4H PRN    enoxaparin (LOVENOX) subcutaneous injection 40 mg  40 mg Subcutaneous Daily    labetalol (NORMODYNE) injection 10 mg  10 mg Intravenous Once    labetalol (NORMODYNE) injection 10 mg  10 mg Intravenous Q6H PRN    lisinopril (ZESTRIL) tablet 10 mg  10 mg Oral Daily    ondansetron (ZOFRAN) injection 4 mg  4 mg Intravenous Q6H PRN    pantoprazole (PROTONIX) EC tablet 40 mg  40 mg Oral Early Morning       No Known Allergies    Objective     Blood pressure 140/86, pulse 76, temperature (!) 97 4 °F (36 3 °C), temperature source Oral, resp   rate 16, height 5' 6" (1 676 m), weight 83 9 kg (185 lb), SpO2 97 %  Intake/Output Summary (Last 24 hours) at 03/18/18 1337  Last data filed at 03/18/18 2856   Gross per 24 hour   Intake              180 ml   Output                0 ml   Net              180 ml       PHYSICAL EXAM     Physical Exam   Constitutional: She is oriented to person, place, and time  She appears well-developed and well-nourished  No distress  HENT:   Head: Normocephalic and atraumatic  Eyes: Right eye exhibits no discharge  Left eye exhibits no discharge  No scleral icterus  Neck: Neck supple  No tracheal deviation present  Cardiovascular: Normal rate, regular rhythm and normal heart sounds  Exam reveals no gallop and no friction rub  No murmur heard  Pulmonary/Chest: Effort normal and breath sounds normal  No respiratory distress  She has no rales  Abdominal: Soft  Bowel sounds are normal  She exhibits no distension and no mass  There is no tenderness  There is no rebound and no guarding  Neurological: She is alert and oriented to person, place, and time  Skin: Skin is warm and dry  Psychiatric: She has a normal mood and affect         Lab Results:   CBC: Lab Results   Component Value Date    WBC 7 51 03/18/2018    HGB 10 8 (L) 03/18/2018    HCT 34 7 (L) 03/18/2018    MCV 87 03/18/2018     03/18/2018    MCH 27 1 03/18/2018    MCHC 31 1 (L) 03/18/2018    RDW 14 3 03/18/2018    MPV 9 2 03/18/2018    NRBC 0 03/17/2018   ,   CMP: Lab Results   Component Value Date     03/18/2018    K 3 7 03/18/2018     03/18/2018    CO2 28 03/18/2018    ANIONGAP 7 03/18/2018    BUN 9 03/18/2018    CREATININE 0 85 03/18/2018    GLUCOSE 94 03/18/2018    CALCIUM 9 2 03/18/2018    AST 14 03/18/2018    ALT 15 03/18/2018    ALKPHOS 109 03/18/2018    PROT 7 2 03/18/2018    BILITOT 0 91 03/18/2018    EGFR 67 03/18/2018   ,   Lipase:   Lab Results   Component Value Date    LIPASE 107 03/17/2018   ,  PT/INR: No results found for: PT, INR, Troponin:   Lab Results   Component Value Date    TROPONINI <0 02 03/18/2018   ,   Magnesium: No results found for: MAG,   Phosphorous:   Lab Results   Component Value Date    PHOS 4 1 03/18/2018     Imaging Studies: I have personally reviewed pertinent reports  CTA - CHEST AND ABDOMEN  - WITHOUT AND WITH IV CONTRAST     INDICATION:   htn, cp concern for pericardial effusion on bedisde echo      COMPARISON:  None      TECHNIQUE: CT examination of the chest and abdomen was performed both prior to and after the administration of intravenous contrast   Thin section angiographic arterial phase post contrast technique was used in order to evaluate for aortic dissection  3D reformatted images and volume rendering were performed on an independent workstation  Additionally, axial, sagittal, and coronal 2D reformatted images were created from the source data and submitted for interpretation      Radiation dose length product (DLP) for this visit:  2134 64 mGy-cm   This examination, like all CT scans performed in the [facility], was performed utilizing techniques to minimize radiation dose exposure, including the use of   iterative reconstruction and automated exposure control      IV Contrast:  100 mL of iohexol (OMNIPAQUE)  Enteric Contrast: Enteric contrast was not administered      FINDINGS:      AORTA: There is no aortic dissection or intramural hematoma  There is no aortic aneurysm  There is atherosclerotic calcification of the aorta wall  Caliber is normal   No visible aortic valvular calcifications  Branch vessels at the arch and the major   branch vessels in the abdomen appear patent and within normal limits of caliber  There appear to be 2 left renal arteries and one right renal artery      CHEST     LUNGS:  There is very mild pulmonary emphysema  There is a 2 or 3 mm subpleural nodule in the right upper lobe on image 25 series 3    There is a 1 mm nodule on image 30 in the right upper lobe laterally  There is a 1 or 2 mm nodule in the right upper   lobe on image 37 anteriorly  There are no suspicious infiltrates to suggest pneumonia  No visible airway obstructions      PLEURA:  Unremarkable      HEART/PULMONARY ARTERIAL TREE:  There is no pericardial effusion  Heart size is within normal limits  The pulmonary arterial tree appears within normal limits      MEDIASTINUM AND JUAN:  The esophagus appears thick-walled  Correlate for any history of reflux or esophagitis  The wall also has a slightly hazy outer margin and there is minimal prominence of some lymph nodes around the esophagus  While probably less   likely than esophagitis, the possibility of esophageal neoplasm cannot be excluded based on this exam and further workup is therefore suggested  Upper endoscopy would probably be the most sensitive test      CHEST WALL AND LOWER NECK:   There seems to be slight asymmetric prominence of tissue within the medial left breast compared with the right, without any definitive evidence of focal mass  Patient does not appear to have any prior mammograms at [address]  If she has not had at least for yearly screening study, mammography is recommended on a nonemergent basis      ABDOMEN     LIVER/BILIARY TREE:  Small round low-density hepatic lesions in the left and right lobes have appearance favoring cysts  The left-sided finding is 11 mm and the right lobe finding is 7-8 mm      GALLBLADDER:  No calcified gallstones  No pericholecystic inflammatory change      SPLEEN:  Unremarkable      PANCREAS:  Unremarkable      ADRENAL GLANDS:  There is minimal nodular thickening of the left adrenal gland  It measures up to 8 mm diameter  The density is -4 Hounsfield units on the noncontrast portion of the exam, diagnostic of benign adenoma      KIDNEYS/URETERS:  Unremarkable  No hydronephrosis      VISUALIZED STOMACH AND BOWEL:  There is colonic diverticulosis       VISUALIZED ABDOMINOPELVIC CAVITY:  No ascites or free intraperitoneal air  No lymphadenopathy  ABDOMINAL WALL:  Unremarkable      OSSEOUS STRUCTURES:  No acute fracture or destructive osseous lesion      IMPRESSION:     There is no aortic dissection or aneurysm  There is no pericardial effusion      The esophagus appears abnormal with a thickened hazy appearing wall and surrounding mild adenopathy  This might indicate esophagitis which could be infectious or due to reflux  Esophageal neoplasm is not excluded  Further dedicated workup suggested      Slight asymmetry of the inner left breast   Consider nonemergent follow-up mammogram      Colonic diverticulosis      Small hepatic hypodensities which are probably cysts  Patient was seen and examined by Dr Stalin Momin  All vila medical decisions were made by Dr Stalin Momin  Thank you for allowing us to participate in the care of this present patient  We will follow-up with you closely

## 2018-03-18 NOTE — PHYSICIAN ADVISOR
Current patient class: Observation  The patient is currently on Hospital Day: 2      The patient was admitted to the hospital at N/A on N/A for the following diagnosis:  Chest pain [R07 9]  Hypertension [I10]       There is documentation in the medical record of an expected length of stay of at least 2 midnights  The patient is therefore expected to satisfy the 2 midnight benchmark and given the 2 midnight presumption is appropriate for INPATIENT ADMISSION  Given this expectation of a satisfying stay, CMS instructs us that the patient is most often appropriate for inpatient admission under part A provided medical necessity is documented in the chart  After review of the relevant documentation, labs, vital signs and test results, the patient is appropriate for INPATIENT ADMISSION  Admission to the hospital as an inpatient is a complex decision making process which requires the practitioner to consider the patients presenting complaint, history and physical examination and all relevant testing  With this in mind, in this case, the patient was deemed appropriate for INPATIENT ADMISSION  After review of the documentation and testing available at the time of the admission I concur with this clinical determination of medical necessity  Rationale is as follows: The patient is a 78 yrs old Female who presented to the ED at 3/17/2018  1:53 PM with a chief complaint of Chest Pain (Patient reports sternal chest pain starting this morning that woke her up )     The patient is 78years old does have an abnormal CT scan suggestive of esophageal malignancy  GI is consulted, the patient will continue to remain hospitalized for evaluation by them  Patient is currently being kept NPO, and plans for and endoscopy    Given that the patient will require a 2nd midnight in the hospital for further evaluation of her atypical chest pain, possibility of esophageal malignancy, the patient is appropriate for inpatient admission  The patients vitals on arrival were ED Triage Vitals   Temperature Pulse Respirations Blood Pressure SpO2   03/17/18 1356 03/17/18 1356 03/17/18 1356 03/17/18 1356 03/17/18 1356   97 6 °F (36 4 °C) 104 18 (!) 211/102 99 %      Temp Source Heart Rate Source Patient Position - Orthostatic VS BP Location FiO2 (%)   03/17/18 1356 03/17/18 1356 03/17/18 1356 03/17/18 1356 --   Tympanic Monitor Lying Right arm       Pain Score       03/17/18 1353       6           Past Medical History:   Diagnosis Date    Hypertension      Past Surgical History:   Procedure Laterality Date    BREAST LUMPECTOMY      DENTAL SURGERY      TONSILLECTOMY             Consults have been placed to:   IP CONSULT TO GASTROENTEROLOGY  IP CONSULT TO CASE MANAGEMENT    Vitals:    03/18/18 0331 03/18/18 0752 03/18/18 1100 03/18/18 1537   BP: 102/52 170/90 140/86 146/80   BP Location: Right arm Right arm Right arm Right arm   Pulse: 74 76 76 80   Resp: 18 18 16 18   Temp: 98 2 °F (36 8 °C) (!) 97 4 °F (36 3 °C)  97 5 °F (36 4 °C)   TempSrc: Oral Oral  Oral   SpO2: 95% 94% 97% 96%   Weight:       Height:           Most recent labs:    Recent Labs      03/17/18   1405   03/18/18   0028  03/18/18   0449   WBC  8 79   --    --   7 51   HGB  12 4   --    --   10 8*   HCT  38 8   --    --   34 7*   PLT  318   --    --   288   K  3 4*   --    --   3 7   NA  141   --    --   139   CALCIUM  9 2   --    --   9 2   BUN  11   --    --   9   CREATININE  0 85   --    --   0 85   LIPASE  107   --    --    --    TROPONINI  <0 02   < >  <0 02   --    AST  19   --    --   14   ALT  19   --    --   15   ALKPHOS  129*   --    --   109   BILITOT  0 78   --    --   0 91    < > = values in this interval not displayed         Scheduled Meds:  Current Facility-Administered Medications:  acetaminophen 650 mg Oral Q6H PRN Nigel Peña MD   aluminum-magnesium hydroxide-simethicone 15 mL Oral Q4H PRN Nigel Peña MD   enoxaparin 40 mg Subcutaneous Daily Ronnell Alcala Amena Ruff MD   labetalol 10 mg Intravenous Once Kim Farley MD   labetalol 10 mg Intravenous Q6H PRN Raudel Pfeiffer MD   lisinopril 10 mg Oral Daily Raudel Pfeiffer MD   ondansetron 4 mg Intravenous Q6H PRN Raudel Pfeiffer MD   pantoprazole 40 mg Oral Early Morning Raudel Pfeiffer MD     Continuous Infusions:   PRN Meds:   acetaminophen    aluminum-magnesium hydroxide-simethicone    labetalol    ondansetron    Surgical procedures (if appropriate):

## 2018-03-18 NOTE — CASE MANAGEMENT
Initial Clinical Review    Admission: Date/Time/Statement: 03/17/18 @ 1737 -- OBS    Orders Placed This Encounter   Procedures    Place in Observation (expected length of stay for this patient is less than two midnights)     Standing Status:   Standing     Number of Occurrences:   1     Order Specific Question:   Admitting Physician     Answer:   KITA JONES [[id number]]     Order Specific Question:   Level of Care     Answer:   Med Surg [16]       ED: Date/Time/Mode of Arrival:   ED Arrival Information     Expected Arrival Acuity Means of Arrival Escorted By Service Admission Type    - 3/17/2018 13:47 Emergent Walk-In Self General Medicine Emergency    Arrival Complaint    Chest Pain          Chief Complaint:   Chief Complaint   Patient presents with    Chest Pain     Patient reports sternal chest pain starting this morning that woke her up  History of Illness: 78 y o  female with past medical history of GERD and hypertension with medication noncompliance who woke up this morning developed chest pain with radiation to jaw  Patient has never felt these symptoms before  Patient notes that she had infrequent indigestion but never took medicines for that  Patient notes that she can walk only a few blocks before having to stop due to shortness of breath  Patient can climb stairs because of osteoarthritis in her knees  Patient did not take her blood pressure medication because she believes she did not needed  Patient had an echo done in 2016 unclear exactly what was noted but patient notes an enlarged heart    Patient measured her blood pressure at home and noted to be 521D systolic blood pressure        ED Vital Signs:   ED Triage Vitals   Temperature Pulse Respirations Blood Pressure SpO2   03/17/18 1356 03/17/18 1356 03/17/18 1356 03/17/18 1356 03/17/18 1356   97 6 °F (36 4 °C) 104 18 (!) 211/102 99 %      Temp Source Heart Rate Source Patient Position - Orthostatic VS BP Location FiO2 (%)   03/17/18 1356 03/17/18 1356 03/17/18 1356 03/17/18 1356 --   Tympanic Monitor Lying Right arm       Pain Score       03/17/18 1353       6        Wt Readings from Last 1 Encounters:   03/17/18 83 9 kg (185 lb)       Vital Signs (abnormal): /52 - 228/109    Abnormal Labs/Diagnostic Test Results: K 3 4, Alk phos 129  CXR -- No acute cardiopulmonary disease  CTA c/a -- There is no aortic dissection or aneurysm  There is no pericardial effusion  The esophagus appears abnormal with a thickened hazy appearing wall and surrounding mild adenopathy  This might indicate esophagitis which could be infectious or due to reflux  Esophageal neoplasm is not excluded  Further dedicated workup suggested  Slight asymmetry of the inner left breast   Consider nonemergent follow-up mammogram  Colonic diverticulosis  Small hepatic hypodensities which are probably cysts  ED Treatment:   Medication Administration from 03/17/2018 1347 to 03/17/2018 1846       Date/Time Order Dose Route Action Action by Comments     03/17/2018 1432 nitroglycerin (NITROSTAT) SL tablet 0 4 mg 0 4 mg Sublingual Given Zachery Arechiga RN                 03/17/2018 1525 iohexol (OMNIPAQUE) 350 MG/ML injection (MULTI-DOSE) 100 mL 100 mL Intravenous Given Elizabeth Low      03/17/2018 1702 lidocaine viscous (XYLOCAINE) 2 % mucosal solution 15 mL 15 mL Swish & Spit Given Zachery Arechiga RN      03/17/2018 1702 aluminum-magnesium hydroxide-simethicone (MYLANTA) 200-200-20 mg/5 mL oral suspension 20 mL 20 mL Oral Given Zachery Arechiga RN           Past Medical/Surgical History:   Past Medical History:   Diagnosis Date    Hypertension        Admitting Diagnosis: Chest pain [R07 9]  Hypertension [I10]    Age/Sex: 78 y o  female    Assessment/Plan:   Chest pain - likely secondary to GERD/esophagitis  Troponin x1 negative  EKG although poor was not specific for ACS  Cta dissection protocol - no aortic dissection or an aneurysm  No pericardial effusion    Soft tissues appeared to be thickened with possible surrounding mild adenopathy  Could not exclude esophageal neoplasm  Slight asymmetry of the inner left breast but patient reports to have had a history of breast surgery  Should be clarified with patient  Colonic diverticulosis    Small hepatic hypodensities with a probable cyst   - troponin repeat  - consider repeat Ekg  - nitrostat  - protonix 40mg daily, mylanta prn  - zofran for nausea  - mylanta prn  - f/u CBC, CMP, mg, phosphorus  - spoke to Gi, regarding endoscopy and will scope on Monday  - NPO tomorrow night     Hypertensive Urgency - non compliant with   - continue with home lisinopril 10 mg daily  - labetalol Q 6 p r n  for blood pressure greater than 180     Other  - TSH  - Hga1c  - Kdur 40 1x     Code Status: Level 1 - Full Code  VTE Pharmacologic Prophylaxis: Enoxaparin (Lovenox)   VTE Mechanical Prophylaxis: sequential compression device  Admission Status: OBSERVATION       Admission Orders:  Tele unit  Serial troponins  Npo   Consult GI  NPO after mdn 3/18 for EGD 3/19    Scheduled Meds:   Current Facility-Administered Medications:  acetaminophen 650 mg Oral Q6H PRN Marisa Brush MD   aluminum-magnesium hydroxide-simethicone 15 mL Oral Q4H PRN Marisa Brush MD   enoxaparin 40 mg Subcutaneous Daily Marisa Brush MD   labetalol 10 mg Intravenous Once Sean Alexis MD   labetalol 10 mg Intravenous Q6H PRN Marisa Brush MD   lisinopril 10 mg Oral Daily Marisa Brush MD   ondansetron 4 mg Intravenous Q6H PRN Marisa Brush MD   pantoprazole 40 mg Oral Early Morning Marisa Brush MD     Continuous Infusions:    PRN Meds:   acetaminophen    aluminum-magnesium hydroxide-simethicone    labetalol    ondansetron    3/18 --  Principal Problem:    Chest pain  Active Problems:    Hypertensive urgency    Hypertension    GERD (gastroesophageal reflux disease)    Esophagitis     Chest pain - likely related to GERD as patient has nocturnal cough and symptoms when lying flat  EKG shows 1st degree AV block  Telemetry negative for any acute events  Troponin negative x3  - DC telemetry  - continue with PPI and mylanta for symptomatic relief     Soft tissue thickening on CTA - unable to rule out possible esophageal neoplasm  - NPO after midnight  - GI plans for EGD tomorrow     Anemia - Hgb was normal when arrived 12 4 and dropped to 10 8    No signs of bleeding  - monitor CBC     Disposition: Continue symptom management and plan for EGD in the AM   NPO after midnight

## 2018-03-18 NOTE — PROGRESS NOTES
IM Residency Progress Note   Unit/Bed#: CW2 210-02 Encounter: [id number]  SOD Team A      Jeremi Franks 78 y o  female [facility] Stay Days: 0      Assessment/Plan:    Principal Problem:    Chest pain  Active Problems:    Hypertensive urgency    Hypertension    GERD (gastroesophageal reflux disease)    Esophagitis    Chest pain - likely related to GERD as patient has nocturnal cough and symptoms when lying flat  EKG shows 1st degree AV block  Telemetry negative for any acute events  Troponin negative x3  - DC telemetry  - continue with PPI and mylanta for symptomatic relief    Soft tissue thickening on CTA - unable to rule out possible esophageal neoplasm  - NPO after midnight  - GI plans for EGD tomorrow    Anemia - Hgb was normal when arrived 12 4 and dropped to 10 8  No signs of bleeding  - monitor CBC    Disposition: Continue symptom management and plan for EGD in the AM   NPO after midnight      Subjective:   Patient reports that she feels better  She notes that she gets nocturnal cough and burning sensation when lying flat at night  She notes that the symptoms that brought her in yesterday were more severe than her general indegestion  She currently only has some mild discomfort  No SOB, Nausea or vomiting  No other new complaints or concerns  Vitals: Temp (24hrs), Av 9 °F (36 6 °C), Min:97 4 °F (36 3 °C), Max:98 2 °F (36 8 °C)  Current: Temperature: (!) 97 4 °F (36 3 °C)  Vitals:    18 0011 18 0331 18 0752   BP: 150/66 160/100 102/52 170/90   BP Location: Right arm Right arm Right arm Right arm   Pulse:  89 74 76   Resp:     Temp:  98 2 °F (36 8 °C) 98 2 °F (36 8 °C) (!) 97 4 °F (36 3 °C)   TempSrc:  Oral Oral Oral   SpO2:  98% 95% 94%   Weight:       Height:        Body mass index is 29 86 kg/m²  I/O last 24 hours: In: 180 [P O :180]  Out: -       Physical Exam   Constitutional: She is oriented to person, place, and time   She appears well-developed and well-nourished  No distress  HENT:   Head: Normocephalic and atraumatic  Eyes: No scleral icterus  Neck: No tracheal deviation present  Cardiovascular: Normal rate and regular rhythm  No murmur heard  Pulmonary/Chest: Effort normal and breath sounds normal  No stridor  She has no wheezes  Abdominal: Soft  Bowel sounds are normal  There is no tenderness  Musculoskeletal: She exhibits no edema or deformity  Neurological: She is alert and oriented to person, place, and time  She exhibits normal muscle tone  Skin: Skin is warm and dry  No rash noted  No pallor  Psychiatric: She has a normal mood and affect  Her behavior is normal    Vitals reviewed        Invasive Devices     Peripheral Intravenous Line            Peripheral IV 03/17/18 Left Antecubital less than 1 day                Labs:   Recent Results (from the past 24 hour(s))   ECG 12 lead    Collection Time: 03/17/18  2:00 PM   Result Value Ref Range    Ventricular Rate 88 BPM    Atrial Rate 88 BPM    [place] Interval 220 ms    QRSD Interval 70 ms    QT Interval 330 ms    QTC Interval 399 ms    P [place] 64 degrees    QRS Axis 40 degrees    T Wave Axis 86 degrees   Comprehensive metabolic panel    Collection Time: 03/17/18  2:05 PM   Result Value Ref Range    Sodium 141 136 - 145 mmol/L    Potassium 3 4 (L) 3 5 - 5 3 mmol/L    Chloride 105 100 - 108 mmol/L    CO2 29 21 - 32 mmol/L    Anion Gap 7 4 - 13 mmol/L    BUN 11 5 - 25 mg/dL    Creatinine 0 85 0 60 - 1 30 mg/dL    Glucose 112 65 - 140 mg/dL    Calcium 9 2 8 3 - 10 1 mg/dL    AST 19 5 - 45 U/L    ALT 19 12 - 78 U/L    Alkaline Phosphatase 129 (H) 46 - 116 U/L    Total Protein 8 2 6 4 - 8 2 g/dL    Albumin 3 7 3 5 - 5 0 g/dL    Total Bilirubin 0 78 0 20 - 1 00 mg/dL    eGFR 67 ml/min/1 73sq m   CBC and differential    Collection Time: 03/17/18  2:05 PM   Result Value Ref Range    WBC 8 79 4 31 - 10 16 Thousand/uL    RBC 4 47 3 81 - 5 12 Million/uL    Hemoglobin 12 4 11 5 - 15 4 g/dL    Hematocrit 38 8 34 8 - 46 1 %    MCV 87 82 - 98 fL    MCH 27 7 26 8 - 34 3 pg    MCHC 32 0 31 4 - 37 4 g/dL    RDW 14 3 11 6 - 15 1 %    MPV 9 4 8 9 - 12 7 fL    Platelets 448 173 - 972 Thousands/uL    nRBC 0 /100 WBCs    Neutrophils Relative 66 43 - 75 %    Lymphocytes Relative 28 14 - 44 %    Monocytes Relative 5 4 - 12 %    Eosinophils Relative 1 0 - 6 %    Basophils Relative 0 0 - 1 %    Neutrophils Absolute 5 80 1 85 - 7 62 Thousands/µL    Lymphocytes Absolute 2 47 0 60 - 4 47 Thousands/µL    Monocytes Absolute 0 40 0 17 - 1 22 Thousand/µL    Eosinophils Absolute 0 08 0 00 - 0 61 Thousand/µL    Basophils Absolute 0 02 0 00 - 0 10 Thousands/µL   Troponin I    Collection Time: 03/17/18  2:05 PM   Result Value Ref Range    Troponin I <0 02 <=0 04 ng/mL   Lipase    Collection Time: 03/17/18  2:05 PM   Result Value Ref Range    Lipase 107 73 - 393 u/L   Troponin I    Collection Time: 03/17/18  9:20 PM   Result Value Ref Range    Troponin I <0 02 <=0 04 ng/mL   Troponin I    Collection Time: 03/18/18 12:28 AM   Result Value Ref Range    Troponin I <0 02 <=0 04 ng/mL   TSH, 3rd generation    Collection Time: 03/18/18  4:49 AM   Result Value Ref Range    TSH 3RD GENERATON 1 250 0 358 - 3 740 uIU/mL   Comprehensive metabolic panel    Collection Time: 03/18/18  4:49 AM   Result Value Ref Range    Sodium 139 136 - 145 mmol/L    Potassium 3 7 3 5 - 5 3 mmol/L    Chloride 104 100 - 108 mmol/L    CO2 28 21 - 32 mmol/L    Anion Gap 7 4 - 13 mmol/L    BUN 9 5 - 25 mg/dL    Creatinine 0 85 0 60 - 1 30 mg/dL    Glucose 94 65 - 140 mg/dL    Calcium 9 2 8 3 - 10 1 mg/dL    AST 14 5 - 45 U/L    ALT 15 12 - 78 U/L    Alkaline Phosphatase 109 46 - 116 U/L    Total Protein 7 2 6 4 - 8 2 g/dL    Albumin 3 3 (L) 3 5 - 5 0 g/dL    Total Bilirubin 0 91 0 20 - 1 00 mg/dL    eGFR 67 ml/min/1 73sq m   Magnesium    Collection Time: 03/18/18  4:49 AM   Result Value Ref Range    Magnesium 2 2 1 6 - 2 6 mg/dL   Phosphorus Collection Time: 03/18/18  4:49 AM   Result Value Ref Range    Phosphorus 4 1 2 3 - 4 1 mg/dL   CBC (With Platelets)    Collection Time: 03/18/18  4:49 AM   Result Value Ref Range    WBC 7 51 4 31 - 10 16 Thousand/uL    RBC 3 99 3 81 - 5 12 Million/uL    Hemoglobin 10 8 (L) 11 5 - 15 4 g/dL    Hematocrit 34 7 (L) 34 8 - 46 1 %    MCV 87 82 - 98 fL    MCH 27 1 26 8 - 34 3 pg    MCHC 31 1 (L) 31 4 - 37 4 g/dL    RDW 14 3 11 6 - 15 1 %    Platelets 404 172 - 612 Thousands/uL    MPV 9 2 8 9 - 12 7 fL   Hemoglobin A1C w/ EAG Estimation    Collection Time: 03/18/18  4:49 AM   Result Value Ref Range    Hemoglobin A1C 4 7 4 2 - 6 3 %    EAG 88 mg/dl   Lipid panel    Collection Time: 03/18/18  4:49 AM   Result Value Ref Range    Cholesterol 226 (H) 50 - 200 mg/dL    Triglycerides 88 <=150 mg/dL    HDL, Direct 49 40 - 60 mg/dL    LDL Calculated 159 (H) 0 - 100 mg/dL   ECG 12 lead    Collection Time: 03/18/18  7:20 AM   Result Value Ref Range    Ventricular Rate 74 BPM    Atrial Rate 74 BPM    [place] Interval 222 ms    QRSD Interval 80 ms    QT Interval 412 ms    QTC Interval 457 ms    P Axis 45 degrees    QRS Axis 19 degrees    T Wave [place] 61 degrees       Radiology Results: I have personally reviewed pertinent reports  Other Diagnostic Testing:   I have personally reviewed pertinent reports          Active Meds:   Current Facility-Administered Medications   Medication Dose Route Frequency    acetaminophen (TYLENOL) tablet 650 mg  650 mg Oral Q6H PRN    aluminum-magnesium hydroxide-simethicone (MYLANTA) 200-200-20 mg/5 mL oral suspension 15 mL  15 mL Oral Q4H PRN    enoxaparin (LOVENOX) subcutaneous injection 40 mg  40 mg Subcutaneous Daily    labetalol (NORMODYNE) injection 10 mg  10 mg Intravenous Once    labetalol (NORMODYNE) injection 10 mg  10 mg Intravenous Q6H PRN    lisinopril (ZESTRIL) tablet 10 mg  10 mg Oral Daily    ondansetron (ZOFRAN) injection 4 mg  4 mg Intravenous Q6H PRN    pantoprazole (PROTONIX) EC tablet 40 mg  40 mg Oral Early Morning         VTE Pharmacologic Prophylaxis: Enoxaparin (Lovenox)  VTE Mechanical Prophylaxis: sequential compression device    Tomás Adler DO

## 2018-03-19 ENCOUNTER — ANESTHESIA (INPATIENT)
Dept: GASTROENTEROLOGY | Facility: HOSPITAL | Age: 78
DRG: 392 | End: 2018-03-19
Payer: MEDICARE

## 2018-03-19 ENCOUNTER — ANESTHESIA EVENT (INPATIENT)
Dept: GASTROENTEROLOGY | Facility: HOSPITAL | Age: 78
DRG: 392 | End: 2018-03-19
Payer: MEDICARE

## 2018-03-19 VITALS
HEIGHT: 66 IN | OXYGEN SATURATION: 97 % | SYSTOLIC BLOOD PRESSURE: 122 MMHG | HEART RATE: 70 BPM | RESPIRATION RATE: 16 BRPM | TEMPERATURE: 98.1 F | WEIGHT: 185 LBS | BODY MASS INDEX: 29.73 KG/M2 | DIASTOLIC BLOOD PRESSURE: 64 MMHG

## 2018-03-19 PROBLEM — K44.9 HIATAL HERNIA: Status: ACTIVE | Noted: 2018-03-19

## 2018-03-19 PROBLEM — K21.00 GASTROESOPHAGEAL REFLUX DISEASE WITH ESOPHAGITIS: Status: ACTIVE | Noted: 2018-03-17

## 2018-03-19 LAB
ANION GAP SERPL CALCULATED.3IONS-SCNC: 4 MMOL/L (ref 4–13)
BUN SERPL-MCNC: 17 MG/DL (ref 5–25)
CALCIUM SERPL-MCNC: 8.9 MG/DL (ref 8.3–10.1)
CHLORIDE SERPL-SCNC: 104 MMOL/L (ref 100–108)
CO2 SERPL-SCNC: 30 MMOL/L (ref 21–32)
CREAT SERPL-MCNC: 0.92 MG/DL (ref 0.6–1.3)
ERYTHROCYTE [DISTWIDTH] IN BLOOD BY AUTOMATED COUNT: 14.5 % (ref 11.6–15.1)
GFR SERPL CREATININE-BSD FRML MDRD: 61 ML/MIN/1.73SQ M
GLUCOSE SERPL-MCNC: 105 MG/DL (ref 65–140)
HCT VFR BLD AUTO: 34.3 % (ref 34.8–46.1)
HGB BLD-MCNC: 11.1 G/DL (ref 11.5–15.4)
MCH RBC QN AUTO: 28 PG (ref 26.8–34.3)
MCHC RBC AUTO-ENTMCNC: 32.4 G/DL (ref 31.4–37.4)
MCV RBC AUTO: 87 FL (ref 82–98)
PLATELET # BLD AUTO: 289 THOUSANDS/UL (ref 149–390)
PMV BLD AUTO: 9.2 FL (ref 8.9–12.7)
POTASSIUM SERPL-SCNC: 4 MMOL/L (ref 3.5–5.3)
RBC # BLD AUTO: 3.96 MILLION/UL (ref 3.81–5.12)
SODIUM SERPL-SCNC: 138 MMOL/L (ref 136–145)
WBC # BLD AUTO: 7.6 THOUSAND/UL (ref 4.31–10.16)

## 2018-03-19 PROCEDURE — 99238 HOSP IP/OBS DSCHRG MGMT 30/<: CPT | Performed by: INTERNAL MEDICINE

## 2018-03-19 PROCEDURE — 43235 EGD DIAGNOSTIC BRUSH WASH: CPT | Performed by: INTERNAL MEDICINE

## 2018-03-19 PROCEDURE — 85027 COMPLETE CBC AUTOMATED: CPT | Performed by: INTERNAL MEDICINE

## 2018-03-19 PROCEDURE — 0DJ08ZZ INSPECTION OF UPPER INTESTINAL TRACT, VIA NATURAL OR ARTIFICIAL OPENING ENDOSCOPIC: ICD-10-PCS | Performed by: INTERNAL MEDICINE

## 2018-03-19 PROCEDURE — 80048 BASIC METABOLIC PNL TOTAL CA: CPT | Performed by: INTERNAL MEDICINE

## 2018-03-19 RX ORDER — PROPOFOL 10 MG/ML
INJECTION, EMULSION INTRAVENOUS AS NEEDED
Status: DISCONTINUED | OUTPATIENT
Start: 2018-03-19 | End: 2018-03-19 | Stop reason: SURG

## 2018-03-19 RX ORDER — LISINOPRIL 10 MG/1
10 TABLET ORAL
Qty: 30 TABLET | Refills: 0 | Status: SHIPPED | OUTPATIENT
Start: 2018-03-19 | End: 2019-06-05 | Stop reason: ALTCHOICE

## 2018-03-19 RX ORDER — PANTOPRAZOLE SODIUM 40 MG/1
40 TABLET, DELAYED RELEASE ORAL
Qty: 30 TABLET | Refills: 0 | Status: SHIPPED | OUTPATIENT
Start: 2018-03-20 | End: 2019-06-05 | Stop reason: ALTCHOICE

## 2018-03-19 RX ORDER — LIDOCAINE HYDROCHLORIDE 10 MG/ML
INJECTION, SOLUTION INFILTRATION; PERINEURAL AS NEEDED
Status: DISCONTINUED | OUTPATIENT
Start: 2018-03-19 | End: 2018-03-19 | Stop reason: SURG

## 2018-03-19 RX ORDER — AMLODIPINE BESYLATE 5 MG/1
5 TABLET ORAL EVERY MORNING
Qty: 1 TABLET | Refills: 0 | Status: SHIPPED | OUTPATIENT
Start: 2018-03-19 | End: 2019-06-05 | Stop reason: ALTCHOICE

## 2018-03-19 RX ORDER — HYDRALAZINE HYDROCHLORIDE 20 MG/ML
5 INJECTION INTRAMUSCULAR; INTRAVENOUS EVERY 6 HOURS PRN
Status: DISCONTINUED | OUTPATIENT
Start: 2018-03-19 | End: 2018-03-19 | Stop reason: HOSPADM

## 2018-03-19 RX ORDER — AMLODIPINE BESYLATE 5 MG/1
5 TABLET ORAL ONCE
Status: DISCONTINUED | OUTPATIENT
Start: 2018-03-20 | End: 2018-03-19 | Stop reason: HOSPADM

## 2018-03-19 RX ADMIN — PROPOFOL 25 MG: 10 INJECTION, EMULSION INTRAVENOUS at 15:28

## 2018-03-19 RX ADMIN — PROPOFOL 25 MG: 10 INJECTION, EMULSION INTRAVENOUS at 15:29

## 2018-03-19 RX ADMIN — PROPOFOL 25 MG: 10 INJECTION, EMULSION INTRAVENOUS at 15:27

## 2018-03-19 RX ADMIN — PROPOFOL 25 MG: 10 INJECTION, EMULSION INTRAVENOUS at 15:26

## 2018-03-19 RX ADMIN — PROPOFOL 50 MG: 10 INJECTION, EMULSION INTRAVENOUS at 15:24

## 2018-03-19 RX ADMIN — LABETALOL HYDROCHLORIDE 10 MG: 5 INJECTION, SOLUTION INTRAVENOUS at 08:26

## 2018-03-19 RX ADMIN — PROPOFOL 50 MG: 10 INJECTION, EMULSION INTRAVENOUS at 15:25

## 2018-03-19 RX ADMIN — LIDOCAINE HYDROCHLORIDE 100 MG: 10 INJECTION, SOLUTION INFILTRATION; PERINEURAL at 15:24

## 2018-03-19 RX ADMIN — ENOXAPARIN SODIUM 40 MG: 40 INJECTION SUBCUTANEOUS at 08:29

## 2018-03-19 NOTE — PROGRESS NOTES
IM Residency Progress Note   Unit/Bed#: PPHP 705-01 Encounter: [id number]  SOD Team A      Comfort Reyes 78 y o  female [facility] Stay Days: 1    Assessment/Plan:    Chest pain - related to GERD as patient has nocturnal cough and symptoms when lying flat  EKG shows 1st degree AV block  Telemetry negative for any acute events  Troponin negative x3  - continue with protonix 40mg daily for EGD today     Esophagitis per CT - esophageal cancer cannot be rule out  - EGD today     HTN - takes lisinopril as an outpatient  - continue lisinopril 10mg daily and started on amlodipine 5mg daily in AM  - hydralazine prn if BP>180    Principal Problem:    Chest pain  Active Problems:    Hypertensive urgency    Hypertension    GERD (gastroesophageal reflux disease)    Esophagitis    Gastroesophageal reflux disease with esophagitis    Disposition: per SOD awaiting EGD results  Subjective:  Patient denies any chest pain, nausea, vomiting, abdominal pain, headache, dizziness  She is scheduled for getting EGD today at 2:15 p m  Vitals: Temp (24hrs), Av 9 °F (36 6 °C), Min:97 5 °F (36 4 °C), Max:98 2 °F (36 8 °C)  Current: Temperature: 98 1 °F (36 7 °C)  Vitals:    18 1950 18 1953 18 0047 18 0722   BP: (!) 172/85 168/85 136/67 (!) 180/90   BP Location: Right arm Left arm Right arm Right arm   Pulse:   73 73   Resp:   18 18   Temp:   97 9 °F (36 6 °C) 98 1 °F (36 7 °C)   TempSrc:   Oral Oral   SpO2:   97% 97%   Weight:       Height:        Body mass index is 29 86 kg/m²  I/O last 24 hours: In: 200 [P O :380]  Out: -       Physical Exam:   Physical Exam   Constitutional: She appears well-developed and well-nourished  HENT:   Head: Normocephalic and atraumatic  Eyes: Conjunctivae are normal  Pupils are equal, round, and reactive to light  Right eye exhibits no discharge  Left eye exhibits no discharge  No scleral icterus  Neck: Neck supple     Cardiovascular: Normal rate and regular rhythm  No murmur heard  Pulmonary/Chest: Effort normal and breath sounds normal  No respiratory distress  Abdominal: Soft  She exhibits no distension  There is no tenderness  Musculoskeletal: She exhibits no edema  Neurological: She is alert  Skin: Skin is warm and dry  No erythema  Psychiatric: She has a normal mood and affect  Invasive Devices     Peripheral Intravenous Line            Peripheral IV 03/18/18 Left Wrist 1 day                Labs:   Recent Results (from the past 24 hour(s))   CBC    Collection Time: 03/19/18  5:59 AM   Result Value Ref Range    WBC 7 60 4 31 - 10 16 Thousand/uL    RBC 3 96 3 81 - 5 12 Million/uL    Hemoglobin 11 1 (L) 11 5 - 15 4 g/dL    Hematocrit 34 3 (L) 34 8 - 46 1 %    MCV 87 82 - 98 fL    MCH 28 0 26 8 - 34 3 pg    MCHC 32 4 31 4 - 37 4 g/dL    RDW 14 5 11 6 - 15 1 %    Platelets 671 922 - 592 Thousands/uL    MPV 9 2 8 9 - 12 7 fL   Basic metabolic panel    Collection Time: 03/19/18  5:59 AM   Result Value Ref Range    Sodium 138 136 - 145 mmol/L    Potassium 4 0 3 5 - 5 3 mmol/L    Chloride 104 100 - 108 mmol/L    CO2 30 21 - 32 mmol/L    Anion Gap 4 4 - 13 mmol/L    BUN 17 5 - 25 mg/dL    Creatinine 0 92 0 60 - 1 30 mg/dL    Glucose 105 65 - 140 mg/dL    Calcium 8 9 8 3 - 10 1 mg/dL    eGFR 61 ml/min/1 73sq m       Radiology Results: I have personally reviewed pertinent reports  Other Diagnostic Testing:   I have personally reviewed pertinent reports          Active Meds:   Current Facility-Administered Medications   Medication Dose Route Frequency    acetaminophen (TYLENOL) tablet 650 mg  650 mg Oral Q6H PRN    aluminum-magnesium hydroxide-simethicone (MYLANTA) 200-200-20 mg/5 mL oral suspension 15 mL  15 mL Oral Q4H PRN    enoxaparin (LOVENOX) subcutaneous injection 40 mg  40 mg Subcutaneous Daily    labetalol (NORMODYNE) injection 10 mg  10 mg Intravenous Once    labetalol (NORMODYNE) injection 10 mg  10 mg Intravenous Q6H PRN    lisinopril (ZESTRIL) tablet 10 mg  10 mg Oral Daily    ondansetron (ZOFRAN) injection 4 mg  4 mg Intravenous Q6H PRN    pantoprazole (PROTONIX) EC tablet 40 mg  40 mg Oral Early Morning         VTE Pharmacologic Prophylaxis: Enoxaparin (Lovenox)  VTE Mechanical Prophylaxis: sequential compression device    Poli Padilla MD

## 2018-03-19 NOTE — ANESTHESIA PREPROCEDURE EVALUATION
Review of Systems/Medical History  Patient summary reviewed  Chart reviewed  No history of anesthetic complications     Cardiovascular  EKG reviewed, Exercise tolerance: poor,  Hypertension poorly controlled,    Pulmonary       GI/Hepatic    GERD poorly controlled,             Endo/Other     GYN       Hematology   Musculoskeletal       Neurology   Psychology           Physical Exam    Airway    Mallampati score: II  TM Distance: >3 FB  Neck ROM: full     Dental   lower dentures and upper dentures,     Cardiovascular      Pulmonary      Other Findings        Anesthesia Plan  ASA Score- 3     Anesthesia Type- IV sedation with anesthesia with ASA Monitors  Additional Monitors:   Airway Plan:         Plan Factors-    Induction- intravenous  Postoperative Plan-     Informed Consent- Anesthetic plan and risks discussed with patient and son  I personally reviewed this patient with the CRNA  Discussed and agreed on the Anesthesia Plan with the CRNA  Gerhardt Sep

## 2018-03-19 NOTE — DISCHARGE SUMMARY
[facility] CENTRAL Discharge Summary - Medical Osmin Sahu 78 y o  female MRN: [id number]    [address] [facility] GI LAB PRE/POST Room / Bed: ENDOSCOPY Enid Beauchamp Encounter: [id number]    BRIEF OVERVIEW    Admitting Provider: Aydee Griggs MD  Discharge Provider: Aydee Griggs MD  Primary Care Physician at Discharge: Dr Alonso Seaman    Discharge To:  home      Admission Date: 3/17/2018     Discharge Date: 3/19/2018    Primary Discharge Diagnosis  Principal Problem:    Chest pain  Active Problems:    Hypertensive urgency    Hypertension    GERD (gastroesophageal reflux disease)    Esophagitis    Gastroesophageal reflux disease with esophagitis    Hiatal hernia  Resolved Problems:    * No resolved hospital problems  *      Other Problems Addressed: none    Consulting Providers  - gastroenterology    Therapeutic Operative Procedures Performed - none    Diagnostic Procedures Performed - EGD - #1  Esophagus- LA grade A esophagitis  3 cm hiatal hernia, stomach- normal, duodenum- normal     - Chest x ray - no acute cardiopulmonary disease    - CTA dissection protocol chest and abdomen - There is no aortic dissection or aneurysm  There is no pericardial effusion  The esophagus appears abnormal with a thickened hazy appearing wall and surrounding mild adenopathy  This might indicate esophagitis which could be infectious or due to reflux  Esophageal neoplasm is not excluded  Further dedicated workup suggested  Slight asymmetry of the inner left breast   Consider nonemergent follow-up mammogram   Colonic diverticulosis  Small hepatic hypodensities which are probably cysts  Discharge Disposition: Final discharge disposition not confirmed  Discharged With Lines: no    Test Results Pending at Discharge: none    Outpatient Follow-Up - Patient will follow up with Dr Alonso Seaman in 1-2 weeks after discharge    Follow up with consulting providers - none  Active Issues Requiring Follow-up - none    Code Status: Level 1 - Full Code    Medications   See after visit summary for reconciled discharge medications provided to patient and family  Your Medications   Your Medications     Morning Afternoon Evening Bedtime As Needed    amLODIPine 5 mg tablet   Commonly known as: NORVASC   Take 1 tablet (5 mg total) by mouth every morning   Refills: 0                    lisinopril 10 mg tablet   Commonly known as: ZESTRIL   Take 1 tablet (10 mg total) by mouth daily with dinner   Refills: 0   What changed: when to take this                    pantoprazole 40 mg tablet   Commonly known as: PROTONIX   Start taking on: 3/20/2018   Take 1 tablet (40 mg total) by mouth daily in the early morning   Refills: 0               Allergies  No Known Allergies  Discharge Diet: regular diet  Activity restrictions: none    [address] [facility] Course - Patient is a 19-year-old female with past medical history of hypertension  Patient was noncompliant with lisinopril that she supposed to be taken as an outpatient  Patient started to have indigestion together with chest pain  Chest pain worsens when she lies down  CTA of chest showed esophagitis suspicious for possible esophageal malignancy  Gastroenterology was consulted and performed EGD  EGD showed low grade esophagitis, 3 cm hiatal hernia  Patient was started on protonix 40mg daily  Patient was admitted to medicine service  Her problems were addressed as follows:    Chest pain - related to GERD as patient has nocturnal cough and symptoms when lying flat  EKG shows 1st degree AV block  Telemetry negative for any acute events   Troponin negative x3  EGD was performed and showed low grade esophagitis  Patient was started on protonix 40mg daily      Esophagitis - Patient had EGD that showed esophagitis and hiatal hernia  Patient was started on protonix 40mg daily  Hiatal hernia - stable     HTN - Patient supposed to be taking lisinopril 10mg daily, however she was noncompliant   Patient was continued on lisinopril 10mg daily in the evening and started on amlodipine 5mg daily in the morning  Presenting Problem/History of Present Illness  Principal Problem:    Chest pain  Active Problems:    Hypertensive urgency    Hypertension    GERD (gastroesophageal reflux disease)    Esophagitis    Gastroesophageal reflux disease with esophagitis    Hiatal hernia  Resolved Problems:    * No resolved hospital problems  *    Other Pertinent Test Results    Discharge Condition: good    Discharge  Statement   I spent 30 minutes minutes discharging the patient  This time was spent on the day of discharge  I had direct contact with the patient on the day of discharge  Additional documentation is required if more than 30 minutes were spent on discharge

## 2018-03-19 NOTE — DISCHARGE INSTRUCTIONS
Esophagitis   WHAT YOU NEED TO KNOW:   What is esophagitis? Esophagitis is inflammation or irritation of the lining of the esophagus  What causes esophagitis? The most common cause is acid reflux  This means stomach acid backs up into your esophagus  The following can also cause esophagitis:  · An infection from bacteria, a virus, or a fungus    · Vomiting or a hiatal hernia    · Medicines such as aspirin or NSAIDs    · Large pills taken without enough water or right before you go to bed    · Cancer treatment, such as radiation    · A toxic object you swallowed, such as a button battery, that gets stuck in your esophagus    · Too much caffeine or acidic or spicy foods    · Cigarette smoking  What are the signs and symptoms of esophagitis? Signs and symptoms depend on the cause of your esophagitis  You may have any of the following:  · Pain in the middle of your chest that may spread to your back    · Burning or pain in your esophagus, abdominal pain, or indigestion    · Trouble swallowing, or pain when you swallow    · A feeling that something is stuck in your esophagus    · Sore throat, a cough, or hoarseness    · Gagging, drooling, or wheezing    · Mouth sores (white patches), or a bad taste in your mouth or bad breath    · Nausea or vomiting    · Feeding problems or failure to thrive (young children)  How is esophagitis diagnosed? Your healthcare provider will ask about your symptoms and when they started  Tell him if anything makes your symptoms worse or better  You may need any of the following:  · An endoscopy  is a procedure used to look at your esophagus and stomach  Your healthcare provider will use an endoscope (tube with a camera and light on the end)  He may also take a tissue sample during the procedure  The sample may show if your esophagus was damaged by what is causing your esophagitis  · A barium swallow  is done to show if your esophagus was damaged and how badly it was damaged  X-rays are taken after you swallow barium liquid  Barium liquid is used to help damage show up on the x-ray  How is esophagitis treated? The goal of treatment is to help the lining of your esophagus heal and to prevent serious complications  Treatment will depend on what is causing your esophagitis  Symptoms caused by a toxic object such as a button battery need immediate treatment  Less severe causes may not need treatment  You may need any of the following if symptoms continue or get worse:  · Medicines  may be given to fight infection or to control stomach acid  Your healthcare provider may make changes to your medicines, such as changing it to a liquid form  · An elimination diet  may help you find foods that are causing your symptoms  You will stop eating certain foods that can cause esophagitis  Your healthcare provider will tell you to start eating them again one at a time  Each time you do not have symptoms, you will start eating another food from the list  Any food that does cause symptoms may be causing your esophagitis  · Surgery  may be needed if other treatments do not work  Part of your stomach can be wrapped to cover the valve between your stomach and esophagus  This helps prevent acid from backing up into your esophagus  What can I do to manage or prevent esophagitis? · Do not smoke  Nicotine and other chemicals in cigarettes and cigars can cause blood vessel and lung damage  Ask your healthcare provider for information if you currently smoke and need help to quit  E-cigarettes or smokeless tobacco still contain nicotine  Talk to your healthcare provider before you use these products  · Do not drink alcohol  Alcohol can irritate your esophagus  Talk to your healthcare provider if you need help to stop drinking  · Limit or do not eat foods that can lead to esophagitis  Foods such as oranges and salsa can irritate your esophagus  Caffeine and chocolate can cause acid reflux  High-fat and fried foods make your stomach digest food more slowly  This increases the amount of stomach acid your esophagus is exposed to  Eat small meals, and drink water with your meals  Soft foods such as yogurt and applesauce may help soothe your throat  Do not eat for at least 3 hours before you go to bed  · Keep batteries and similar objects out of the reach of children  Babies often put items in their mouths to explore them  Button batteries are easy to swallow and can cause serious damage  Keep the battery covers of electronic devices such as remote controls taped closed  Store all batteries and toxic materials where children cannot get to them  Use childproof locks to keep children away from dangerous materials  · Drink more liquid when you take pills  Drink a full glass of water when you take your pills  Ask your healthcare provider if you can take your pills at least an hour before you go to bed  · Prevent acid reflux  Do not bend over unless it is necessary  Acid may back up into your esophagus when you bend over  If possible, keep the head of your bed elevated while you sleep  This will help keep acid from backing up  Manage stress  Stress can make your symptoms worse or cause stomach acid to back up  Call 911 for any of the following:   · You have chest pain that does not go away within a few minutes or gets worse  When should I seek immediate care? · You feel like you have food stuck in your throat and you cannot cough it out  When should I contact my healthcare provider? · You have new or worsening symptoms, even after treatment  · You have questions or concerns about your condition or care  CARE AGREEMENT:   You have the right to help plan your care  Learn about your health condition and how it may be treated  Discuss treatment options with your caregivers to decide what care you want to receive  You always have the right to refuse treatment   The above information is an  only  It is not intended as medical advice for individual conditions or treatments  Talk to your doctor, nurse or pharmacist before following any medical regimen to see if it is safe and effective for you  © 2017 [employer] Information is for End User's use only and may not be sold, redistributed or otherwise used for commercial purposes  All illustrations and images included in CareNotes® are the copyrighted property of [employer]  or [employer]

## 2018-03-19 NOTE — OP NOTE
OPERATIVE REPORT  PATIENT NAME: Garrick Webber    :  1940  MRN: [id number]  Pt Location: [facility]    SURGERY DATE: 3/19/2018    Surgeon(s) and Role:     * Ascencion Hassan MD - Primary    ESOPHAGOGASTRODUODENOSCOPY    PROCEDURE: EGD    SEDATION: Monitored anesthesia care, check anesthesia records    ASA Class: 2    INDICATIONS:  Chest pain and esophageal thickening on the CT scan  CONSENT:  Informed consent was obtained for the procedure, including sedation after explaining the risks and benefits of the procedure  Risks including but not limited to bleeding, perforation, infection, and missed lesion  PREPARATION:   Telemetry, pulse oximetry, blood pressure were monitored throughout the procedure  Patient was identified by myself both verbally and by visual inspection of ID band  DESCRIPTION:   Patient was placed in the left lateral decubitus position and was sedated with the above medication  The gastroscope was introduced in to the oropharynx and the esophagus was intubated under direct visualization  Scope was passed down the esophagus up to 2nd part of the duodenum  A careful inspection was made as the gastroscope was withdrawn, including a retroflexed view of the stomach; findings and interventions are described below  FINDINGS:    #1  Esophagus- LA grade A esophagitis  3 cm hiatal hernia  #2  Stomach- normal    #3  Duodenum- normal         IMPRESSIONS:      Mild esophagitis  Small hiatal hernia otherwise normal EGD  RECOMMENDATIONS:     Protonix 40 mg p o  daily  Resume regular diet  Reflux precautions  Colonoscopy as an outpatient    COMPLICATIONS:  None; patient tolerated the procedure well            DISPOSITION: PACU           CONDITION: Stable      SIGNATURE: Ascencion Hassan MD  DATE: 2018  TIME: 3:31 PM

## 2018-03-21 LAB
ATRIAL RATE: 79 BPM
P AXIS: 38 DEGREES
PR INTERVAL: 232 MS
QRS AXIS: 15 DEGREES
QRSD INTERVAL: 80 MS
QT INTERVAL: 382 MS
QTC INTERVAL: 438 MS
T WAVE AXIS: 77 DEGREES
VENTRICULAR RATE: 79 BPM

## 2018-03-21 PROCEDURE — 93010 ELECTROCARDIOGRAM REPORT: CPT | Performed by: INTERNAL MEDICINE

## 2018-06-27 ENCOUNTER — TRANSCRIBE ORDERS (OUTPATIENT)
Dept: ADMINISTRATIVE | Age: 78
End: 2018-06-27

## 2018-06-27 ENCOUNTER — APPOINTMENT (OUTPATIENT)
Dept: LAB | Age: 78
End: 2018-06-27
Payer: MEDICARE

## 2018-06-27 DIAGNOSIS — I10 PRIMARY HYPERTENSION: Primary | ICD-10-CM

## 2018-06-27 DIAGNOSIS — I10 PRIMARY HYPERTENSION: ICD-10-CM

## 2018-06-27 LAB
ANION GAP SERPL CALCULATED.3IONS-SCNC: 5 MMOL/L (ref 4–13)
BUN SERPL-MCNC: 17 MG/DL (ref 5–25)
CALCIUM SERPL-MCNC: 9.5 MG/DL (ref 8.3–10.1)
CHLORIDE SERPL-SCNC: 101 MMOL/L (ref 100–108)
CO2 SERPL-SCNC: 31 MMOL/L (ref 21–32)
CREAT SERPL-MCNC: 0.95 MG/DL (ref 0.6–1.3)
GFR SERPL CREATININE-BSD FRML MDRD: 59 ML/MIN/1.73SQ M
GLUCOSE SERPL-MCNC: 88 MG/DL (ref 65–140)
POTASSIUM SERPL-SCNC: 3.3 MMOL/L (ref 3.5–5.3)
SODIUM SERPL-SCNC: 137 MMOL/L (ref 136–145)

## 2018-06-27 PROCEDURE — 36415 COLL VENOUS BLD VENIPUNCTURE: CPT

## 2018-06-27 PROCEDURE — 80048 BASIC METABOLIC PNL TOTAL CA: CPT

## 2018-07-16 ENCOUNTER — LAB REQUISITION (OUTPATIENT)
Dept: LAB | Facility: HOSPITAL | Age: 78
End: 2018-07-16
Payer: MEDICARE

## 2018-07-16 DIAGNOSIS — K21.9 GASTRO-ESOPHAGEAL REFLUX DISEASE WITHOUT ESOPHAGITIS: ICD-10-CM

## 2018-07-16 LAB — HEMOCCULT STL QL IA: NEGATIVE

## 2018-07-16 PROCEDURE — G0328 FECAL BLOOD SCRN IMMUNOASSAY: HCPCS | Performed by: INTERNAL MEDICINE

## 2019-05-21 ENCOUNTER — TRANSCRIBE ORDERS (OUTPATIENT)
Dept: ADMINISTRATIVE | Facility: HOSPITAL | Age: 79
End: 2019-05-21

## 2019-05-21 DIAGNOSIS — N64.9 DISORDER OF BREAST: Primary | ICD-10-CM

## 2019-05-22 ENCOUNTER — HOSPITAL ENCOUNTER (OUTPATIENT)
Dept: MAMMOGRAPHY | Facility: CLINIC | Age: 79
Discharge: HOME | End: 2019-05-22
Payer: MEDICARE

## 2019-05-22 ENCOUNTER — HOSPITAL ENCOUNTER (OUTPATIENT)
Dept: ULTRASOUND IMAGING | Facility: CLINIC | Age: 79
Discharge: HOME | End: 2019-05-22
Payer: MEDICARE

## 2019-05-22 VITALS — HEIGHT: 66 IN | WEIGHT: 185 LBS | BODY MASS INDEX: 29.73 KG/M2

## 2019-05-22 DIAGNOSIS — N64.9 DISORDER OF BREAST: ICD-10-CM

## 2019-05-22 DIAGNOSIS — N63.0 LUMP OR MASS IN BREAST: ICD-10-CM

## 2019-05-22 PROCEDURE — 76642 ULTRASOUND BREAST LIMITED: CPT

## 2019-05-22 PROCEDURE — 77066 DX MAMMO INCL CAD BI: CPT

## 2019-05-22 PROCEDURE — G0279 TOMOSYNTHESIS, MAMMO: HCPCS

## 2019-05-22 RX ORDER — LOSARTAN POTASSIUM AND HYDROCHLOROTHIAZIDE 12.5; 5 MG/1; MG/1
1 TABLET ORAL
COMMUNITY
End: 2020-10-13 | Stop reason: SDUPTHER

## 2019-05-22 RX ORDER — FAMOTIDINE 40 MG/1
40 TABLET, FILM COATED ORAL AS NEEDED
COMMUNITY
End: 2021-04-23 | Stop reason: SDUPTHER

## 2019-05-23 ENCOUNTER — HOSPITAL ENCOUNTER (OUTPATIENT)
Dept: ULTRASOUND IMAGING | Facility: CLINIC | Age: 79
Discharge: HOME | End: 2019-05-23
Admitting: RADIOLOGY
Payer: MEDICARE

## 2019-05-23 ENCOUNTER — HOSPITAL ENCOUNTER (OUTPATIENT)
Dept: MAMMOGRAPHY | Facility: CLINIC | Age: 79
Discharge: HOME | End: 2019-05-23
Payer: MEDICARE

## 2019-05-23 VITALS — DIASTOLIC BLOOD PRESSURE: 88 MMHG | HEART RATE: 80 BPM | SYSTOLIC BLOOD PRESSURE: 166 MMHG

## 2019-05-23 DIAGNOSIS — R92.8 ABNORMAL ULTRASOUND OF BREAST: ICD-10-CM

## 2019-05-23 DIAGNOSIS — N63.0 BREAST LUMP: ICD-10-CM

## 2019-05-23 DIAGNOSIS — R92.8 ABNORMAL MAMMOGRAM: ICD-10-CM

## 2019-05-23 PROCEDURE — 88305 TISSUE EXAM BY PATHOLOGIST: CPT | Performed by: PATHOLOGY

## 2019-05-23 PROCEDURE — 88342 IMHCHEM/IMCYTCHM 1ST ANTB: CPT | Performed by: PATHOLOGY

## 2019-05-23 PROCEDURE — 19083 BX BREAST 1ST LESION US IMAG: CPT

## 2019-05-23 PROCEDURE — 88360 TUMOR IMMUNOHISTOCHEM/MANUAL: CPT | Performed by: PATHOLOGY

## 2019-05-23 PROCEDURE — 88341 IMHCHEM/IMCYTCHM EA ADD ANTB: CPT | Performed by: PATHOLOGY

## 2019-05-23 RX ORDER — LIDOCAINE HYDROCHLORIDE 10 MG/ML
5 INJECTION, SOLUTION INFILTRATION; PERINEURAL ONCE
Status: COMPLETED | OUTPATIENT
Start: 2019-05-23 | End: 2019-05-23

## 2019-05-23 RX ADMIN — LIDOCAINE HYDROCHLORIDE 5 ML: 10 INJECTION, SOLUTION INFILTRATION; PERINEURAL at 14:06

## 2019-05-28 ENCOUNTER — TELEPHONE (OUTPATIENT)
Dept: MAMMOGRAPHY | Facility: CLINIC | Age: 79
End: 2019-05-28

## 2019-06-04 PROBLEM — K21.00 GASTROESOPHAGEAL REFLUX DISEASE WITH ESOPHAGITIS: Status: RESOLVED | Noted: 2018-03-17 | Resolved: 2019-06-04

## 2019-06-04 PROBLEM — I16.0 HYPERTENSIVE URGENCY: Status: RESOLVED | Noted: 2018-03-17 | Resolved: 2019-06-04

## 2019-06-04 PROBLEM — D05.12 DUCTAL CARCINOMA IN SITU (DCIS) OF LEFT BREAST: Status: ACTIVE | Noted: 2019-06-04

## 2019-06-05 ENCOUNTER — CONSULT (OUTPATIENT)
Dept: SURGICAL ONCOLOGY | Facility: CLINIC | Age: 79
End: 2019-06-05
Payer: MEDICARE

## 2019-06-05 VITALS
DIASTOLIC BLOOD PRESSURE: 82 MMHG | HEART RATE: 81 BPM | TEMPERATURE: 97.1 F | BODY MASS INDEX: 30.7 KG/M2 | SYSTOLIC BLOOD PRESSURE: 138 MMHG | WEIGHT: 191 LBS | RESPIRATION RATE: 16 BRPM | HEIGHT: 66 IN

## 2019-06-05 DIAGNOSIS — D05.12 DUCTAL CARCINOMA IN SITU (DCIS) OF LEFT BREAST: Primary | ICD-10-CM

## 2019-06-05 PROCEDURE — 99205 OFFICE O/P NEW HI 60 MIN: CPT | Performed by: SURGERY

## 2019-06-06 ENCOUNTER — TELEPHONE (OUTPATIENT)
Dept: HEMATOLOGY ONCOLOGY | Facility: CLINIC | Age: 79
End: 2019-06-06

## 2019-06-13 ENCOUNTER — HOSPITAL ENCOUNTER (OUTPATIENT)
Dept: MAMMOGRAPHY | Facility: CLINIC | Age: 79
Discharge: HOME | End: 2019-06-13
Payer: MEDICARE

## 2019-06-13 ENCOUNTER — HOSPITAL ENCOUNTER (OUTPATIENT)
Dept: MAMMOGRAPHY | Facility: CLINIC | Age: 79
Discharge: HOME | End: 2019-06-13
Admitting: RADIOLOGY
Payer: MEDICARE

## 2019-06-13 VITALS
WEIGHT: 191 LBS | HEART RATE: 72 BPM | HEIGHT: 66 IN | BODY MASS INDEX: 30.7 KG/M2 | SYSTOLIC BLOOD PRESSURE: 120 MMHG | DIASTOLIC BLOOD PRESSURE: 70 MMHG

## 2019-06-13 DIAGNOSIS — R92.0 MAMMOGRAPHIC MICROCALCIFICATION: ICD-10-CM

## 2019-06-13 DIAGNOSIS — D05.12 DUCTAL CARCINOMA IN SITU (DCIS) OF LEFT BREAST: ICD-10-CM

## 2019-06-13 PROCEDURE — 88342 IMHCHEM/IMCYTCHM 1ST ANTB: CPT | Performed by: PATHOLOGY

## 2019-06-13 PROCEDURE — 88341 IMHCHEM/IMCYTCHM EA ADD ANTB: CPT | Performed by: PATHOLOGY

## 2019-06-13 PROCEDURE — 88305 TISSUE EXAM BY PATHOLOGIST: CPT | Performed by: PATHOLOGY

## 2019-06-13 PROCEDURE — 19081 BX BREAST 1ST LESION STRTCTC: CPT

## 2019-06-13 RX ORDER — LIDOCAINE HYDROCHLORIDE AND EPINEPHRINE BITARTRATE 20; .01 MG/ML; MG/ML
10 INJECTION, SOLUTION SUBCUTANEOUS ONCE
Status: COMPLETED | OUTPATIENT
Start: 2019-06-13 | End: 2019-06-13

## 2019-06-13 RX ORDER — LIDOCAINE HYDROCHLORIDE 10 MG/ML
4 INJECTION, SOLUTION INFILTRATION; PERINEURAL ONCE
Status: COMPLETED | OUTPATIENT
Start: 2019-06-13 | End: 2019-06-13

## 2019-06-13 RX ADMIN — LIDOCAINE HYDROCHLORIDE AND EPINEPHRINE 10 ML: 20; 10 INJECTION, SOLUTION INFILTRATION; PERINEURAL at 13:15

## 2019-06-13 RX ADMIN — LIDOCAINE HYDROCHLORIDE 4 ML: 10 INJECTION, SOLUTION INFILTRATION; PERINEURAL at 13:15

## 2019-06-18 PROBLEM — R07.9 CHEST PAIN: Status: RESOLVED | Noted: 2018-03-17 | Resolved: 2019-06-18

## 2019-06-18 PROBLEM — N60.92 ATYPICAL LOBULAR HYPERPLASIA (ALH) OF LEFT BREAST: Status: ACTIVE | Noted: 2019-06-18

## 2019-06-20 ENCOUNTER — HOSPITAL ENCOUNTER (OUTPATIENT)
Dept: RADIOLOGY | Facility: HOSPITAL | Age: 79
Discharge: HOME | End: 2019-06-20
Attending: SURGERY
Payer: MEDICARE

## 2019-06-20 ENCOUNTER — OFFICE VISIT (OUTPATIENT)
Dept: SURGICAL ONCOLOGY | Facility: CLINIC | Age: 79
End: 2019-06-20
Payer: MEDICARE

## 2019-06-20 ENCOUNTER — OFFICE VISIT (OUTPATIENT)
Dept: LAB | Facility: CLINIC | Age: 79
End: 2019-06-20
Payer: MEDICARE

## 2019-06-20 ENCOUNTER — APPOINTMENT (OUTPATIENT)
Dept: LAB | Facility: CLINIC | Age: 79
End: 2019-06-20
Payer: MEDICARE

## 2019-06-20 ENCOUNTER — TRANSCRIBE ORDERS (OUTPATIENT)
Dept: LAB | Facility: CLINIC | Age: 79
End: 2019-06-20

## 2019-06-20 VITALS
SYSTOLIC BLOOD PRESSURE: 180 MMHG | BODY MASS INDEX: 31.18 KG/M2 | HEART RATE: 80 BPM | HEIGHT: 66 IN | DIASTOLIC BLOOD PRESSURE: 98 MMHG | RESPIRATION RATE: 18 BRPM | TEMPERATURE: 98.1 F | WEIGHT: 194 LBS

## 2019-06-20 DIAGNOSIS — D05.12 DUCTAL CARCINOMA IN SITU (DCIS) OF LEFT BREAST: ICD-10-CM

## 2019-06-20 DIAGNOSIS — N60.92 ATYPICAL LOBULAR HYPERPLASIA (ALH) OF LEFT BREAST: ICD-10-CM

## 2019-06-20 DIAGNOSIS — D05.12 DUCTAL CARCINOMA IN SITU (DCIS) OF LEFT BREAST: Primary | ICD-10-CM

## 2019-06-20 DIAGNOSIS — Z01.818 PREOP EXAMINATION: ICD-10-CM

## 2019-06-20 LAB
ALBUMIN SERPL BCP-MCNC: 3.6 G/DL (ref 3.5–5)
ALP SERPL-CCNC: 119 U/L (ref 46–116)
ALT SERPL W P-5'-P-CCNC: 22 U/L (ref 12–78)
ANION GAP SERPL CALCULATED.3IONS-SCNC: 6 MMOL/L (ref 4–13)
AST SERPL W P-5'-P-CCNC: 18 U/L (ref 5–45)
ATRIAL RATE: 59 BPM
BASOPHILS # BLD AUTO: 0.04 THOUSANDS/ΜL (ref 0–0.1)
BASOPHILS NFR BLD AUTO: 1 % (ref 0–1)
BILIRUB SERPL-MCNC: 0.9 MG/DL (ref 0.2–1)
BUN SERPL-MCNC: 15 MG/DL (ref 5–25)
CALCIUM SERPL-MCNC: 8.9 MG/DL (ref 8.3–10.1)
CHLORIDE SERPL-SCNC: 102 MMOL/L (ref 100–108)
CO2 SERPL-SCNC: 30 MMOL/L (ref 21–32)
CREAT SERPL-MCNC: 0.95 MG/DL (ref 0.6–1.3)
EOSINOPHIL # BLD AUTO: 0.16 THOUSAND/ΜL (ref 0–0.61)
EOSINOPHIL NFR BLD AUTO: 2 % (ref 0–6)
ERYTHROCYTE [DISTWIDTH] IN BLOOD BY AUTOMATED COUNT: 13.8 % (ref 11.6–15.1)
GFR SERPL CREATININE-BSD FRML MDRD: 67 ML/MIN/1.73SQ M
GLUCOSE SERPL-MCNC: 103 MG/DL (ref 65–140)
HCT VFR BLD AUTO: 35.9 % (ref 34.8–46.1)
HGB BLD-MCNC: 11.3 G/DL (ref 11.5–15.4)
IMM GRANULOCYTES # BLD AUTO: 0.02 THOUSAND/UL (ref 0–0.2)
IMM GRANULOCYTES NFR BLD AUTO: 0 % (ref 0–2)
LYMPHOCYTES # BLD AUTO: 2.61 THOUSANDS/ΜL (ref 0.6–4.47)
LYMPHOCYTES NFR BLD AUTO: 33 % (ref 14–44)
MCH RBC QN AUTO: 29.5 PG (ref 26.8–34.3)
MCHC RBC AUTO-ENTMCNC: 31.5 G/DL (ref 31.4–37.4)
MCV RBC AUTO: 94 FL (ref 82–98)
MONOCYTES # BLD AUTO: 0.43 THOUSAND/ΜL (ref 0.17–1.22)
MONOCYTES NFR BLD AUTO: 5 % (ref 4–12)
NEUTROPHILS # BLD AUTO: 4.66 THOUSANDS/ΜL (ref 1.85–7.62)
NEUTS SEG NFR BLD AUTO: 59 % (ref 43–75)
NRBC BLD AUTO-RTO: 0 /100 WBCS
P AXIS: 38 DEGREES
PLATELET # BLD AUTO: 315 THOUSANDS/UL (ref 149–390)
PMV BLD AUTO: 8.8 FL (ref 8.9–12.7)
POTASSIUM SERPL-SCNC: 3.6 MMOL/L (ref 3.5–5.3)
PR INTERVAL: 214 MS
PROT SERPL-MCNC: 7.8 G/DL (ref 6.4–8.2)
QRS AXIS: 16 DEGREES
QRSD INTERVAL: 78 MS
QT INTERVAL: 440 MS
QTC INTERVAL: 435 MS
RBC # BLD AUTO: 3.83 MILLION/UL (ref 3.81–5.12)
SODIUM SERPL-SCNC: 138 MMOL/L (ref 136–145)
T WAVE AXIS: 79 DEGREES
VENTRICULAR RATE: 59 BPM
WBC # BLD AUTO: 7.92 THOUSAND/UL (ref 4.31–10.16)

## 2019-06-20 PROCEDURE — 99214 OFFICE O/P EST MOD 30 MIN: CPT | Performed by: SURGERY

## 2019-06-20 PROCEDURE — 93005 ELECTROCARDIOGRAM TRACING: CPT

## 2019-06-20 PROCEDURE — 36415 COLL VENOUS BLD VENIPUNCTURE: CPT

## 2019-06-20 PROCEDURE — 93010 ELECTROCARDIOGRAM REPORT: CPT | Performed by: INTERNAL MEDICINE

## 2019-06-20 PROCEDURE — 71046 X-RAY EXAM CHEST 2 VIEWS: CPT

## 2019-06-20 PROCEDURE — 85025 COMPLETE CBC W/AUTO DIFF WBC: CPT

## 2019-06-20 PROCEDURE — 80053 COMPREHEN METABOLIC PANEL: CPT

## 2019-06-20 RX ORDER — OXYCODONE HYDROCHLORIDE AND ACETAMINOPHEN 5; 325 MG/1; MG/1
1 TABLET ORAL EVERY 6 HOURS PRN
Qty: 12 TABLET | Refills: 0 | Status: SHIPPED | OUTPATIENT
Start: 2019-06-20 | End: 2020-10-27

## 2019-06-20 NOTE — H&P (VIEW-ONLY)
Surgical Oncology Follow Up       [address]  [facility] [place]  [address] [facility] [address]    Davidson Galvan  1940  [id number]  [address] [facility],[address]  [facility] [place]  [address] [address]    Chief Complaint   Patient presents with    Follow-up          Assessment & Plan:   Patient presents for a follow-up visit  Her more posterior biopsy demonstrated atypia  Between this as well as the patient thinking about the extent of her disease she is willing to undergo a mastectomy which I think is the better choice  We subsequent to the process of informed consent for a left mastectomy in conjunction with sentinel lymph node biopsy and possible axillary dissection  Patient does not want any reconstruction  All questions were answered the patient's satisfaction  Cancer History:        Ductal carcinoma in situ (DCIS) of left breast    5/23/2019 Biopsy     Left breast ultrasound-guided biopsy  10 o'clock, 4 cm from nipple  Ductal Carcinoma in Situ  Grade 2    NV 90      6/13/2019 Biopsy     Left breast biopsy of additional suspicious area  Lower inner quadrant, posterior portion, 8 cm from nipple  Single focus of atypical lobular hyperplasia           Interval History:   See above    Review of Systems:   Review of Systems   All other systems reviewed and are negative        Past Medical History     Patient Active Problem List   Diagnosis    Hypertension    GERD (gastroesophageal reflux disease)    Esophagitis    Hiatal hernia    Ductal carcinoma in situ (DCIS) of left breast    Atypical lobular hyperplasia (ALH) of left breast     Past Medical History:   Diagnosis Date    Esophagitis     GERD (gastroesophageal reflux disease)     Hypertension      Past Surgical History:   Procedure Laterality Date    BREAST LUMPECTOMY Left 1978    DENTAL SURGERY      ESOPHAGOGASTRODUODENOSCOPY N/A 3/19/2018    Procedure: ESOPHAGOGASTRODUODENOSCOPY (EGD); Surgeon: Ferdinand Sanders MD;  [facility]: [facility];   Service: Gastroenterology    MAMMO STEREOTACTIC BREAST BIOPSY LEFT (ALL INC) Left 2019    TONSILLECTOMY      US GUIDED BREAST BIOPSY LEFT COMPLETE Left 2019     Family History   Problem Relation Age of Onset    No Known Problems Mother     No Known Problems Father     No Known Problems Sister     Multiple sclerosis Daughter     No Known Problems Sister     Multiple sclerosis Sister     Multiple sclerosis Sister     No Known Problems Sister     No Known Problems Sister     No Known Problems Maternal Aunt     No Known Problems Maternal Aunt     No Known Problems Maternal Aunt     No Known Problems Maternal Aunt     No Known Problems Maternal Aunt     No Known Problems Maternal Aunt     No Known Problems Maternal Grandmother     No Known Problems Maternal Grandfather     No Known Problems Paternal Grandmother     No Known Problems Paternal Grandfather      Social History     Socioeconomic History    Marital status: /Civil Union     Spouse name: Not on file    Number of children: Not on file    Years of education: Not on file    Highest education level: Not on file   Occupational History    Not on file   Social Needs    Financial resource strain: Not on file    Food insecurity:     Worry: Not on file     Inability: Not on file    Transportation needs:     Medical: Not on file     Non-medical: Not on file   Tobacco Use    Smoking status: Former Smoker     Packs/day: 0 50     Years: 20 00     Pack years: 10 00     Last attempt to quit: 2013     Years since quittin 4    Smokeless tobacco: Never Used   Substance and Sexual Activity    Alcohol use: No    Drug use: No    Sexual activity: Not on file   Lifestyle    Physical activity:     Days per week: Not on file     Minutes per session: Not on file    Stress: Not on file   Relationships    Social connections: Talks on phone: Not on file     Gets together: Not on file     Attends [employer] service: Not on file     Active member of club or organization: Not on file     Attends meetings of clubs or organizations: Not on file     Relationship status: Not on file    Intimate partner violence:     Fear of current or ex partner: Not on file     Emotionally abused: Not on file     Physically abused: Not on file     Forced sexual activity: Not on file   Other Topics Concern    Not on file   Social History Narrative    Not on file       Current Outpatient Medications:     Cholecalciferol (VITAMIN D PO), Take by mouth, Disp: , Rfl:     famotidine (PEPCID) 40 MG tablet, Take 40 mg by mouth daily, Disp: , Rfl:     losartan-hydrochlorothiazide (HYZAAR) 50-12 5 mg per tablet, Take 1 tablet by mouth daily, Disp: , Rfl:     POTASSIUM PO, Take by mouth, Disp: , Rfl:   No Known Allergies    Physical Exam:     Vitals:    06/20/19 1154   BP: (!) 180/98   Pulse: 80   Resp: 18   Temp: 98 1 °F (36 7 °C)     Physical Exam   Constitutional: She is oriented to person, place, and time  She appears well-developed and well-nourished  HENT:   Head: Normocephalic and atraumatic  Mouth/Throat: Oropharynx is clear and moist    Eyes: Pupils are equal, round, and reactive to light  EOM are normal    Neck: Normal range of motion  Neck supple  No JVD present  No tracheal deviation present  No thyromegaly present  Cardiovascular: Normal rate, regular rhythm, normal heart sounds and intact distal pulses  Exam reveals no gallop and no friction rub  No murmur heard  Pulmonary/Chest: Effort normal and breath sounds normal  No respiratory distress  She has no wheezes  She has no rales  Examination of the left breast is unchanged she has a dominant mass in the upper inner quadrant  There is no axillary adenopathy  The right breast is normal       Abdominal: Soft  She exhibits no distension and no mass  There is no hepatomegaly   There is no tenderness  There is no rebound and no guarding  Musculoskeletal: Normal range of motion  She exhibits no edema or tenderness  Lymphadenopathy:     She has no cervical adenopathy  Neurological: She is alert and oriented to person, place, and time  No cranial nerve deficit  Skin: Skin is warm and dry  No rash noted  No erythema  Psychiatric: She has a normal mood and affect  Her behavior is normal    Vitals reviewed  Results:   I reviewed her recent biopsy results as well as her previous biopsy results  I have recommended a mastectomy patient is agreeable to this approach  We will coordinate the surgery next mutual convenience  Advance Care Planning/Advance Directives:  I discussed the disease status, treatment plans and follow-up with the patient

## 2019-06-28 RX ORDER — POTASSIUM CHLORIDE 20 MEQ/1
20 TABLET, EXTENDED RELEASE ORAL 2 TIMES DAILY
COMMUNITY
End: 2022-01-11

## 2019-06-28 NOTE — PRE-PROCEDURE INSTRUCTIONS
Pre-Surgery Instructions:   Medication Instructions    Cholecalciferol (VITAMIN D PO) Instructed patient per Anesthesia Guidelines   famotidine (PEPCID) 40 MG tablet Instructed patient per Anesthesia Guidelines   losartan-hydrochlorothiazide (HYZAAR) 50-12 5 mg per tablet Instructed patient per Anesthesia Guidelines   potassium chloride (K-DUR,KLOR-CON) 20 mEq tablet Instructed patient per Anesthesia Guidelines      Pre op and showering instructions reviewed-Patient has hibiclens-Instructed to bring DINA downing DOS

## 2019-07-01 ENCOUNTER — ANESTHESIA EVENT (OUTPATIENT)
Dept: PERIOP | Facility: HOSPITAL | Age: 79
End: 2019-07-01
Payer: MEDICARE

## 2019-07-02 ENCOUNTER — HOSPITAL ENCOUNTER (OUTPATIENT)
Dept: NUCLEAR MEDICINE | Facility: HOSPITAL | Age: 79
Discharge: HOME | End: 2019-07-02
Attending: SURGERY
Payer: MEDICARE

## 2019-07-02 ENCOUNTER — ANESTHESIA (OUTPATIENT)
Dept: PERIOP | Facility: HOSPITAL | Age: 79
End: 2019-07-02
Payer: MEDICARE

## 2019-07-02 ENCOUNTER — HOSPITAL ENCOUNTER (OUTPATIENT)
Facility: HOSPITAL | Age: 79
Setting detail: OUTPATIENT SURGERY
Discharge: HOME HEALTH | End: 2019-07-03
Attending: SURGERY | Admitting: SURGERY
Payer: MEDICARE

## 2019-07-02 DIAGNOSIS — D05.12 DUCTAL CARCINOMA IN SITU (DCIS) OF LEFT BREAST: ICD-10-CM

## 2019-07-02 PROCEDURE — 38900 IO MAP OF SENT LYMPH NODE: CPT | Performed by: SURGERY

## 2019-07-02 PROCEDURE — 88342 IMHCHEM/IMCYTCHM 1ST ANTB: CPT | Performed by: PATHOLOGY

## 2019-07-02 PROCEDURE — 88333 PATH CONSLTJ SURG CYTO XM 1: CPT | Performed by: PATHOLOGY

## 2019-07-02 PROCEDURE — 38792 RA TRACER ID OF SENTINL NODE: CPT | Performed by: SURGERY

## 2019-07-02 PROCEDURE — 19303 MAST SIMPLE COMPLETE: CPT | Performed by: SURGERY

## 2019-07-02 PROCEDURE — 38792 RA TRACER ID OF SENTINL NODE: CPT

## 2019-07-02 PROCEDURE — 88307 TISSUE EXAM BY PATHOLOGIST: CPT | Performed by: PATHOLOGY

## 2019-07-02 PROCEDURE — A9541 TC99M SULFUR COLLOID: HCPCS

## 2019-07-02 PROCEDURE — 38525 BIOPSY/REMOVAL LYMPH NODES: CPT | Performed by: SURGERY

## 2019-07-02 PROCEDURE — NC001 PR NO CHARGE: Performed by: SURGERY

## 2019-07-02 RX ORDER — ONDANSETRON 2 MG/ML
4 INJECTION INTRAMUSCULAR; INTRAVENOUS ONCE AS NEEDED
Status: DISCONTINUED | OUTPATIENT
Start: 2019-07-02 | End: 2019-07-02 | Stop reason: HOSPADM

## 2019-07-02 RX ORDER — LIDOCAINE HYDROCHLORIDE 10 MG/ML
0.5 INJECTION, SOLUTION EPIDURAL; INFILTRATION; INTRACAUDAL; PERINEURAL ONCE AS NEEDED
Status: DISCONTINUED | OUTPATIENT
Start: 2019-07-02 | End: 2019-07-02 | Stop reason: HOSPADM

## 2019-07-02 RX ORDER — SODIUM CHLORIDE, SODIUM LACTATE, POTASSIUM CHLORIDE, CALCIUM CHLORIDE 600; 310; 30; 20 MG/100ML; MG/100ML; MG/100ML; MG/100ML
125 INJECTION, SOLUTION INTRAVENOUS CONTINUOUS
Status: DISCONTINUED | OUTPATIENT
Start: 2019-07-02 | End: 2019-07-03 | Stop reason: HOSPADM

## 2019-07-02 RX ORDER — SODIUM CHLORIDE, SODIUM LACTATE, POTASSIUM CHLORIDE, CALCIUM CHLORIDE 600; 310; 30; 20 MG/100ML; MG/100ML; MG/100ML; MG/100ML
75 INJECTION, SOLUTION INTRAVENOUS CONTINUOUS
Status: DISCONTINUED | OUTPATIENT
Start: 2019-07-02 | End: 2019-07-03 | Stop reason: HOSPADM

## 2019-07-02 RX ORDER — FENTANYL CITRATE 50 UG/ML
INJECTION, SOLUTION INTRAMUSCULAR; INTRAVENOUS AS NEEDED
Status: DISCONTINUED | OUTPATIENT
Start: 2019-07-02 | End: 2019-07-02 | Stop reason: SURG

## 2019-07-02 RX ORDER — ONDANSETRON 2 MG/ML
INJECTION INTRAMUSCULAR; INTRAVENOUS AS NEEDED
Status: DISCONTINUED | OUTPATIENT
Start: 2019-07-02 | End: 2019-07-02 | Stop reason: SURG

## 2019-07-02 RX ORDER — MIDAZOLAM HYDROCHLORIDE 1 MG/ML
INJECTION INTRAMUSCULAR; INTRAVENOUS AS NEEDED
Status: DISCONTINUED | OUTPATIENT
Start: 2019-07-02 | End: 2019-07-02 | Stop reason: SURG

## 2019-07-02 RX ORDER — OXYCODONE HYDROCHLORIDE 5 MG/1
2.5 TABLET ORAL EVERY 4 HOURS PRN
Status: DISCONTINUED | OUTPATIENT
Start: 2019-07-02 | End: 2019-07-03 | Stop reason: HOSPADM

## 2019-07-02 RX ORDER — HYDROMORPHONE HCL/PF 1 MG/ML
SYRINGE (ML) INJECTION AS NEEDED
Status: DISCONTINUED | OUTPATIENT
Start: 2019-07-02 | End: 2019-07-02 | Stop reason: SURG

## 2019-07-02 RX ORDER — OXYCODONE HYDROCHLORIDE 5 MG/1
5 TABLET ORAL EVERY 4 HOURS PRN
Status: DISCONTINUED | OUTPATIENT
Start: 2019-07-02 | End: 2019-07-03 | Stop reason: HOSPADM

## 2019-07-02 RX ORDER — PROPOFOL 10 MG/ML
INJECTION, EMULSION INTRAVENOUS CONTINUOUS PRN
Status: DISCONTINUED | OUTPATIENT
Start: 2019-07-02 | End: 2019-07-02 | Stop reason: SURG

## 2019-07-02 RX ORDER — HYDROMORPHONE HCL/PF 1 MG/ML
0.2 SYRINGE (ML) INJECTION
Status: DISCONTINUED | OUTPATIENT
Start: 2019-07-02 | End: 2019-07-02 | Stop reason: HOSPADM

## 2019-07-02 RX ORDER — DIPHENHYDRAMINE HYDROCHLORIDE 50 MG/ML
12.5 INJECTION INTRAMUSCULAR; INTRAVENOUS ONCE AS NEEDED
Status: DISCONTINUED | OUTPATIENT
Start: 2019-07-02 | End: 2019-07-02 | Stop reason: HOSPADM

## 2019-07-02 RX ORDER — LIDOCAINE HYDROCHLORIDE 10 MG/ML
INJECTION, SOLUTION INFILTRATION; PERINEURAL AS NEEDED
Status: DISCONTINUED | OUTPATIENT
Start: 2019-07-02 | End: 2019-07-02 | Stop reason: SURG

## 2019-07-02 RX ORDER — FENTANYL CITRATE/PF 50 MCG/ML
25 SYRINGE (ML) INJECTION
Status: COMPLETED | OUTPATIENT
Start: 2019-07-02 | End: 2019-07-02

## 2019-07-02 RX ORDER — MAGNESIUM HYDROXIDE 1200 MG/15ML
LIQUID ORAL AS NEEDED
Status: DISCONTINUED | OUTPATIENT
Start: 2019-07-02 | End: 2019-07-02 | Stop reason: HOSPADM

## 2019-07-02 RX ORDER — HYDROMORPHONE HCL/PF 1 MG/ML
0.2 SYRINGE (ML) INJECTION
Status: DISCONTINUED | OUTPATIENT
Start: 2019-07-02 | End: 2019-07-03 | Stop reason: HOSPADM

## 2019-07-02 RX ORDER — ACETAMINOPHEN 325 MG/1
650 TABLET ORAL EVERY 6 HOURS PRN
Status: DISCONTINUED | OUTPATIENT
Start: 2019-07-02 | End: 2019-07-03 | Stop reason: HOSPADM

## 2019-07-02 RX ORDER — BUPIVACAINE HYDROCHLORIDE AND EPINEPHRINE 2.5; 5 MG/ML; UG/ML
INJECTION, SOLUTION EPIDURAL; INFILTRATION; INTRACAUDAL; PERINEURAL AS NEEDED
Status: DISCONTINUED | OUTPATIENT
Start: 2019-07-02 | End: 2019-07-02 | Stop reason: HOSPADM

## 2019-07-02 RX ORDER — PROPOFOL 10 MG/ML
INJECTION, EMULSION INTRAVENOUS AS NEEDED
Status: DISCONTINUED | OUTPATIENT
Start: 2019-07-02 | End: 2019-07-02 | Stop reason: SURG

## 2019-07-02 RX ORDER — CEFAZOLIN SODIUM 2 G/50ML
2000 SOLUTION INTRAVENOUS ONCE
Status: COMPLETED | OUTPATIENT
Start: 2019-07-02 | End: 2019-07-02

## 2019-07-02 RX ORDER — ONDANSETRON 2 MG/ML
4 INJECTION INTRAMUSCULAR; INTRAVENOUS EVERY 6 HOURS PRN
Status: DISCONTINUED | OUTPATIENT
Start: 2019-07-02 | End: 2019-07-03 | Stop reason: HOSPADM

## 2019-07-02 RX ORDER — DEXAMETHASONE SODIUM PHOSPHATE 4 MG/ML
INJECTION, SOLUTION INTRA-ARTICULAR; INTRALESIONAL; INTRAMUSCULAR; INTRAVENOUS; SOFT TISSUE AS NEEDED
Status: DISCONTINUED | OUTPATIENT
Start: 2019-07-02 | End: 2019-07-02 | Stop reason: SURG

## 2019-07-02 RX ADMIN — PROPOFOL 150 MG: 10 INJECTION, EMULSION INTRAVENOUS at 10:02

## 2019-07-02 RX ADMIN — PROPOFOL 110 MCG/KG/MIN: 10 INJECTION, EMULSION INTRAVENOUS at 10:15

## 2019-07-02 RX ADMIN — LIDOCAINE HYDROCHLORIDE ANHYDROUS 50 MG: 10 INJECTION, SOLUTION INFILTRATION at 10:02

## 2019-07-02 RX ADMIN — FENTANYL CITRATE 25 MCG: 50 INJECTION INTRAMUSCULAR; INTRAVENOUS at 10:10

## 2019-07-02 RX ADMIN — SODIUM CHLORIDE, SODIUM LACTATE, POTASSIUM CHLORIDE, AND CALCIUM CHLORIDE 75 ML/HR: .6; .31; .03; .02 INJECTION, SOLUTION INTRAVENOUS at 14:15

## 2019-07-02 RX ADMIN — CEFAZOLIN SODIUM 2000 MG: 2 SOLUTION INTRAVENOUS at 09:56

## 2019-07-02 RX ADMIN — FENTANYL CITRATE 25 MCG: 50 INJECTION INTRAMUSCULAR; INTRAVENOUS at 12:02

## 2019-07-02 RX ADMIN — MIDAZOLAM HYDROCHLORIDE 2 MG: 1 INJECTION, SOLUTION INTRAMUSCULAR; INTRAVENOUS at 09:56

## 2019-07-02 RX ADMIN — FENTANYL CITRATE 25 MCG: 50 INJECTION INTRAMUSCULAR; INTRAVENOUS at 12:31

## 2019-07-02 RX ADMIN — DEXAMETHASONE SODIUM PHOSPHATE 4 MG: 4 INJECTION, SOLUTION INTRAMUSCULAR; INTRAVENOUS at 10:25

## 2019-07-02 RX ADMIN — SODIUM CHLORIDE, SODIUM LACTATE, POTASSIUM CHLORIDE, AND CALCIUM CHLORIDE: .6; .31; .03; .02 INJECTION, SOLUTION INTRAVENOUS at 09:56

## 2019-07-02 RX ADMIN — FENTANYL CITRATE 25 MCG: 50 INJECTION INTRAMUSCULAR; INTRAVENOUS at 10:15

## 2019-07-02 RX ADMIN — FENTANYL CITRATE 25 MCG: 50 INJECTION INTRAMUSCULAR; INTRAVENOUS at 12:25

## 2019-07-02 RX ADMIN — ONDANSETRON 4 MG: 2 INJECTION INTRAMUSCULAR; INTRAVENOUS at 10:25

## 2019-07-02 RX ADMIN — HYDROMORPHONE HYDROCHLORIDE 0.5 MG: 1 INJECTION, SOLUTION INTRAMUSCULAR; INTRAVENOUS; SUBCUTANEOUS at 11:11

## 2019-07-02 RX ADMIN — FENTANYL CITRATE 25 MCG: 50 INJECTION INTRAMUSCULAR; INTRAVENOUS at 10:48

## 2019-07-02 RX ADMIN — FENTANYL CITRATE 25 MCG: 50 INJECTION INTRAMUSCULAR; INTRAVENOUS at 10:21

## 2019-07-02 RX ADMIN — FENTANYL CITRATE 25 MCG: 50 INJECTION INTRAMUSCULAR; INTRAVENOUS at 12:17

## 2019-07-02 NOTE — ANESTHESIA POSTPROCEDURE EVALUATION
Post-Op Assessment Note    CV Status:  Stable       Mental Status:  Alert   Hydration Status:  Stable   PONV Controlled:  Controlled   Airway Patency:  Patent   Post Op Vitals Reviewed: Yes      Staff: CRNA           BP     Temp     Pulse     Resp      SpO2

## 2019-07-02 NOTE — OP NOTE
OPERATIVE REPORT  PATIENT NAME: Clyde Jordan    :  1940  MRN: [id number]  Pt Location: [facility] RO[facility]    SURGERY DATE: 2019    Surgeon(s) and Role:     * Cheo Euceda MD - Primary     * José Puri MD - Assisting    Preop Diagnosis:  Ductal carcinoma in situ (DCIS) of left breast [D05 12]    Post-Op Diagnosis Codes:     * Ductal carcinoma in situ (DCIS) of left breast [D05 12]    Procedure(s) (LRB):  BREAST MASTECTOMY WITH SENTINEL LYMPH NODE BIOPSY, LYMPHATIC MAPPING WITH BLUE DYE AND RADIOACTIVE DYE (INJECT AT 0900 BY DR HARDING IN THE OR) (Left)    Specimen(s):  ID Type Source Tests Collected by Time Destination   1 : left axilla Tissue Lymph Node, [place] TISSUE EXAM Cheo Euceda MD 2019 1025    2 : left mastectomy Tissue Breast, Left TISSUE EXAM Cheo Euceda MD 2019 1105        Estimated Blood Loss:   Minimal    Drains:  Closed/Suction Drain Left Breast Bulb 19 Fr  (Active)   Number of days: 0       Closed/Suction Drain Left Breast 19 Fr  (Active)   Number of days: 0       Anesthesia Type:   General    Operative Indications:  Ductal carcinoma in situ (DCIS) of left breast [D05 12]      Operative Findings:  sln not blue, but radioactive (2000 cpm), no other blue radioactive or suspicious nodes  With the sentinel lymph node was negative on touch prep and grossly normal     Complications:   None    Procedure and Technique:  The patient was brought to the operation room and placed under general anesthesia   Attention was paid to ensure appropriate padding and correct positioning   The left breast was injected intradermally with 0 3cc of methylene blue followed by technetium sulfur colloid injected intradermally in a periareolar fashion and then prepped and draped in a sterile fashion   I initiated a time-out, identifying the patient, the correct side and the above procedure   All parties agreed with the time out      The sentinel lymph node was identified through a lower axillary incision, dissected down with electrocautery identified and removed  The node was strongly radioactive but not blue  There were no other radioactive, blue or enlarged lymph nodes in the axilla  The sentinel lymph node was sent to pathology and there was no microscopic evidence of metastasis  The planned elliptical breast incision was then injected with 0 25% Marcaine and epinephrine for local anesthesia   The incision was sharply incised   Thin flaps were then elevated using electrocautery starting superiorly and then continuing medially, inferiorly and finally laterally   The tail of Torres was then dissected away from the axilla proper leaving the breast tethered to the underlying musculature   The breast was then removed from the muscles in a sub-facial plane   The breast was oriented with sutures (short=superior; medium=medial; long=lateral)  The breast was sent to pathology in formalin for permanent pathologic evaluation  Meticulous hemostasis was maintained with electrocautery   The wound was extensively irrigated and two 19mm, slotted, round DINA drains were placed and brought out through separate incisions and secured with nylon sutures   The wound was then closed in two layers  an inner layer of 2-0 vicryl and a subcuticular closure with 4-0 Monocryl  Steri-strips were applied  The counts were correct x2       I was present for the entire procedure    Patient Disposition:  PACU     SIGNATURE: Keturah Rider MD  DATE: July 2, 2019  TIME: 11:23 AM

## 2019-07-02 NOTE — ANESTHESIA PREPROCEDURE EVALUATION
Review of Systems/Medical History  Patient summary reviewed  Chart reviewed  No history of anesthetic complications     Cardiovascular  Exercise tolerance (METS): >4,  Hypertension ,    Pulmonary  Not a smoker (quit 2013) , Asthma: childhood  ,        GI/Hepatic    GERD (prn meds) well controlled,        Negative  ROS        Endo/Other    Obesity (BMI 30)    GYN    Breast cancer        Hematology  Negative hematology ROS      Musculoskeletal  Negative musculoskeletal ROS        Neurology  Negative neurology ROS      Psychology   Negative psychology ROS            Physical Exam    Airway    Mallampati score: II  TM Distance: >3 FB  Neck ROM: full     Dental   upper dentures and lower dentures,     Cardiovascular      Pulmonary      Other Findings       Lab Results   Component Value Date    WBC 7 92 06/20/2019    HGB 11 3 (L) 06/20/2019     06/20/2019     Lab Results   Component Value Date    SODIUM 138 06/20/2019    K 3 6 06/20/2019    BUN 15 06/20/2019    CREATININE 0 95 06/20/2019     Lab Results   Component Value Date    HGBA1C 4 7 03/18/2018     Anesthesia Plan  ASA Score- 2     Anesthesia Type- general with ASA Monitors  Additional Monitors:   Airway Plan: LMA  Plan Factors-    Induction- intravenous  Postoperative Plan-     Informed Consent- Anesthetic plan and risks discussed with patient and spouse  I personally reviewed this patient with the CRNA  Discussed and agreed on the Anesthesia Plan with the CRNA  Joyce Osorio

## 2019-07-02 NOTE — PLAN OF CARE
Problem: Potential for Falls  Goal: Patient will remain free of falls  Description  INTERVENTIONS:  - Assess patient frequently for physical needs  -  Identify cognitive and physical deficits and behaviors that affect risk of falls    -  [place] fall precautions as indicated by assessment   - Educate patient/family on patient safety including physical limitations  - Instruct patient to call for assistance with activity based on assessment  - Modify environment to reduce risk of injury  - Consider OT/PT consult to assist with strengthening/mobility  Outcome: Progressing     Problem: PAIN - ADULT  Goal: Verbalizes/displays adequate comfort level or baseline comfort level  Description  Interventions:  - Encourage patient to monitor pain and request assistance  - Assess pain using appropriate pain scale  - Administer analgesics based on type and severity of pain and evaluate response  - Implement non-pharmacological measures as appropriate and evaluate response  - Consider cultural and social influences on pain and pain management  - Notify physician/advanced practitioner if interventions unsuccessful or patient reports new pain  Outcome: Progressing     Problem: INFECTION - ADULT  Goal: Absence or prevention of progression during hospitalization  Description  INTERVENTIONS:  - Assess and monitor for signs and symptoms of infection  - Monitor lab/diagnostic results  - Monitor all insertion sites, i e  indwelling lines, tubes, and drains  - Monitor endotracheal (as able) and nasal secretions for changes in amount and color  - [place] appropriate cooling/warming therapies per order  - Administer medications as ordered  - Instruct and encourage patient and family to use good hand hygiene technique  - Identify and instruct in appropriate isolation precautions for identified infection/condition  Outcome: Progressing  Goal: Absence of fever/infection during neutropenic period  Description  INTERVENTIONS:  - Monitor WBC  - Implement neutropenic guidelines  Outcome: Progressing     Problem: SAFETY ADULT  Goal: Patient will remain free of falls  Description  INTERVENTIONS:  - Assess patient frequently for physical needs  -  Identify cognitive and physical deficits and behaviors that affect risk of falls    -  [place] fall precautions as indicated by assessment   - Educate patient/family on patient safety including physical limitations  - Instruct patient to call for assistance with activity based on assessment  - Modify environment to reduce risk of injury  - Consider OT/PT consult to assist with strengthening/mobility  Outcome: Progressing  Goal: Maintain or return to baseline ADL function  Description  INTERVENTIONS:  -  Assess patient's ability to carry out ADLs; assess patient's baseline for ADL function and identify physical deficits which impact ability to perform ADLs (bathing, care of mouth/teeth, toileting, grooming, dressing, etc )  - Assess/evaluate cause of self-care deficits   - Assess range of motion  - Assess patient's mobility; develop plan if impaired  - Assess patient's need for assistive devices and provide as appropriate  - Encourage maximum independence but intervene and supervise when necessary  ¯ Involve family in performance of ADLs  ¯ Assess for home care needs following discharge   ¯ Request OT consult to assist with ADL evaluation and planning for discharge  ¯ Provide patient education as appropriate  Outcome: Progressing  Goal: Maintain or return mobility status to optimal level  Description  INTERVENTIONS:  - Assess patient's baseline mobility status (ambulation, transfers, stairs, etc )    - Identify cognitive and physical deficits and behaviors that affect mobility  - Identify mobility aids required to assist with transfers and/or ambulation (gait belt, sit-to-stand, lift, walker, cane, etc )  - [place] fall precautions as indicated by assessment  - Record patient progress and toleration of activity level on Mobility SBAR; progress patient to next Phase/Stage  - Instruct patient to call for assistance with activity based on assessment  - Request Rehabilitation consult to assist with strengthening/weightbearing, etc   Outcome: Progressing     Problem: Knowledge Deficit  Goal: Patient/family/caregiver demonstrates understanding of disease process, treatment plan, medications, and discharge instructions  Description  Complete learning assessment and assess knowledge base    Interventions:  - Provide teaching at level of understanding  - Provide teaching via preferred learning methods  Outcome: Progressing

## 2019-07-02 NOTE — PROGRESS NOTES
Progress Note -Surgery PA  Rashel Osullivan 70 y o  female MRN: [id number]  Unit/Bed#: -01 Encounter: [id number]      Subjective/Objective     Subjective: Pt feels well  Tolerating diet  Pain is minimum  DINA drains x2 with serosanguinous output  Objective:     /86   Pulse 62   Temp 97 6 °F (36 4 °C) (Oral)   Resp 18   Ht 5' 6 5" (1 689 m)   Wt 88 kg (194 lb)   SpO2 97%   BMI 30 84 kg/m²       Intake/Output Summary (Last 24 hours) at 7/2/2019 1548  Last data filed at 7/2/2019 1304  Gross per 24 hour   Intake 800 ml   Output 20 ml   Net 780 ml       Invasive Devices     Peripheral Intravenous Line            Peripheral IV 03/18/18 Left Wrist 471 days    Peripheral IV 07/02/19 Right Hand less than 1 day          Drain            Closed/Suction Drain Left Breast 19 Fr  less than 1 day    Closed/Suction Drain Left Breast Bulb 19 Fr  less than 1 day                Physical Exam:  General appearance: alert and oriented, in no acute distress  Lungs: clear to auscultation bilaterally  Heart: regular rate and rhythm, S1, S2 normal, no murmur, click, rub or gallop  Skin: left breast soft, no evidence of hematoma  No erythema  DINA drains x 2 serosanguinous output  No bruising to back      Current Facility-Administered Medications:     acetaminophen (TYLENOL) tablet 650 mg, 650 mg, Oral, Q6H PRN, Neptali Islas MD    HYDROmorphone (DILAUDID) injection 0 2 mg, 0 2 mg, Intravenous, Q3H PRN, Neptali Islas MD    lactated ringers infusion, 125 mL/hr, Intravenous, Continuous, Juan F Bentley MD    lactated ringers infusion, 75 mL/hr, Intravenous, Continuous, Liz Ek, CRNA, Last Rate: 75 mL/hr at 07/02/19 1415, 75 mL/hr at 07/02/19 1415    [START ON 7/3/2019] losartan potassium-hydrochlorothiazide (HYZAAR 50/12  5) combination, , Oral, Daily, Neptali Islas MD    ondansetron [facility]) injection 4 mg, 4 mg, Intravenous, Q6H PRN, Neptali Islas MD    oxyCODONE (ROXICODONE) IR tablet 2 5 mg, 2 5 mg, Oral, Q4H PRN, José Puri MD    oxyCODONE (ROXICODONE) IR tablet 5 mg, 5 mg, Oral, Q4H PRN, José Puri MD              Lab, Imaging and other studies:I have personally reviewed pertinent lab results  , CBC: No results found for: WBC, HGB, HCT, MCV, PLT, ADJUSTEDWBC, MCH, MCHC, RDW, MPV, NRBC, CMP: No results found for: SODIUM, K, CL, CO2, ANIONGAP, BUN, CREATININE, GLUCOSE, CALCIUM, AST, ALT, ALKPHOS, PROT, BILITOT, EGFR  Labs in chart were reviewed  Lab Results   Component Value Date    WBC 7 92 06/20/2019    HGB 11 3 (L) 06/20/2019    HCT 35 9 06/20/2019     06/20/2019     Lab Results   Component Value Date    K 3 6 06/20/2019     06/20/2019    CO2 30 06/20/2019    BUN 15 06/20/2019    CREATININE 0 95 06/20/2019       VTE Mechanical Prophylaxis: sequential compression device    Assessment:    70year old female POD #0 s/p left breast mastectomy    Plan:    - continue drain care  CM for VNA  - pain control  - oob as able      Patient Active Problem List   Diagnosis    Hypertension    GERD (gastroesophageal reflux disease)    Esophagitis    Hiatal hernia    Ductal carcinoma in situ (DCIS) of left breast    Atypical lobular hyperplasia (ALH) of left breast          This text is generated with voice recognition software  There may be translation, syntax,  or grammatical errors  If you have any questions, please contact the dictating provider      Louis Jang PA-C

## 2019-07-03 VITALS
OXYGEN SATURATION: 97 % | HEART RATE: 67 BPM | HEIGHT: 67 IN | SYSTOLIC BLOOD PRESSURE: 128 MMHG | WEIGHT: 194 LBS | TEMPERATURE: 98.6 F | DIASTOLIC BLOOD PRESSURE: 60 MMHG | RESPIRATION RATE: 18 BRPM | BODY MASS INDEX: 30.45 KG/M2

## 2019-07-03 LAB
ERYTHROCYTE [DISTWIDTH] IN BLOOD BY AUTOMATED COUNT: 13.8 % (ref 11.6–15.1)
HCT VFR BLD AUTO: 29.4 % (ref 34.8–46.1)
HGB BLD-MCNC: 8.9 G/DL (ref 11.5–15.4)
MCH RBC QN AUTO: 28.3 PG (ref 26.8–34.3)
MCHC RBC AUTO-ENTMCNC: 30.3 G/DL (ref 31.4–37.4)
MCV RBC AUTO: 93 FL (ref 82–98)
PLATELET # BLD AUTO: 257 THOUSANDS/UL (ref 149–390)
PMV BLD AUTO: 9.2 FL (ref 8.9–12.7)
RBC # BLD AUTO: 3.15 MILLION/UL (ref 3.81–5.12)
WBC # BLD AUTO: 9.24 THOUSAND/UL (ref 4.31–10.16)

## 2019-07-03 PROCEDURE — 99024 POSTOP FOLLOW-UP VISIT: CPT | Performed by: SURGERY

## 2019-07-03 PROCEDURE — 85027 COMPLETE CBC AUTOMATED: CPT | Performed by: SURGERY

## 2019-07-03 RX ADMIN — SODIUM CHLORIDE, SODIUM LACTATE, POTASSIUM CHLORIDE, AND CALCIUM CHLORIDE 75 ML/HR: .6; .31; .03; .02 INJECTION, SOLUTION INTRAVENOUS at 01:43

## 2019-07-03 RX ADMIN — HYDROCHLOROTHIAZIDE: 12.5 TABLET ORAL at 08:06

## 2019-07-03 NOTE — UTILIZATION REVIEW
Initial Clinical Review    Elective outpatient  surgical procedure    Age/Sex: 70 y o  female     Surgery Date: 7/2/2019    Procedure: BREAST MASTECTOMY WITH SENTINEL LYMPH NODE BIOPSY, LYMPHATIC MAPPING WITH BLUE DYE AND RADIOACTIVE DYE (INJECT AT 0900 BY DR HARDING IN THE OR) (Left)    Anesthesia: general     Operative Findings: sln not blue, but radioactive (2000 cpm), no other blue radioactive or suspicious nodes  With the sentinel lymph node was negative on touch prep and grossly normal     Admission Orders: Date/Time/Statement: 7/2/2019  1447 OUTPATIENT NO CHARGE BED  07/02/19 1448  Outpatient No Charge Bed Once     Transfer Service: Oncology-Surgical       Question: Admitting Physician Answer: Kash Yadav    07/02/19 1447         Vital Signs: /77 (BP Location: Right arm)   Pulse 64   Temp 98 4 °F (36 9 °C) (Oral)   Resp 18   Ht 5' 6 5" (1 689 m)   Wt 88 kg (194 lb)   SpO2 96%   BMI 30 84 kg/m²      Diet: Regular    Mobility: up as tolerated  DVT Prophylaxis: bilateral scds    Pain Control: no prn medication used  Pain Medications             oxyCODONE-acetaminophen (PERCOCET) 5-325 mg per tablet Take 1 tablet by mouth every 6 (six) hours as needed for moderate painMax Daily Amount: 4 tablets          Network Utilization Review Department  Phone: [phone]; Fax [phone]  [email or web address]  ATTENTION: Please call with any questions or concerns to [phone]  and carefully listen to the prompts so that you are directed to the right person  Send all requests for admission clinical reviews, approved or denied determinations and any other requests to fax [phone]   All voicemails are confidential

## 2019-07-03 NOTE — PLAN OF CARE
Problem: DISCHARGE PLANNING - CARE MANAGEMENT  Goal: Discharge to post-acute care or home with appropriate resources  Description  INTERVENTIONS:  - Conduct assessment to determine patient/family and health care team treatment goals, and need for post-acute services based on payer coverage, community resources, and patient preferences, and barriers to discharge  - Address psychosocial, clinical, and financial barriers to discharge as identified in assessment in conjunction with the patient/family and health care team  - Arrange appropriate level of post-acute services according to patients   needs and preference and payer coverage in collaboration with the physician and health care team  - Communicate with and update the patient/family, physician, and health care team regarding progress on the discharge plan  - Arrange appropriate transportation to post-acute venues  Outcome: Completed  Note:   CM spoke with patient and  at the bedside  Patient is requesting VNA at discharge  Denies history of VNA in the past  Patient requesting [facility] referral for DINA drain care  Patient's  is able to assist with DINA drain care at home  Patient's  will transport at time of discharge  All questions answered at the bedside  CM sent referral to [facility]  Nursing updated with plan of care

## 2019-07-03 NOTE — SOCIAL WORK
CM spoke with patient and  at the bedside  Patient is requesting VNA at discharge  Denies history of VNA in the past  Patient requesting [facility] referral for DINA drain care  Patient's  is able to assist with DINA drain care at home  Patient's  will transport at time of discharge  All questions answered at the bedside  CM sent referral to [facility]  Nursing updated with plan of care

## 2019-07-03 NOTE — PROGRESS NOTES
Progress Note -Surgical Oncology  Vernon Louis 70 y o  female MRN: [id number]  Unit/Bed#: -01 Encounter: [id number]      Subjective/Objective     Assessment:    70year old female POD #1 s/p left breast mastectomy    JP1 - 115cc  JP2 - 65cc    Plan:  - D/c home today  - Diet as tolerated  - Drain care and VNA  - Pain control  - OOB/Incentive spirometry    Subjective: No acute events overnight  Patient doing well this AM  Pain well controlled and tolerating regular diet  Patient was instructed how to empty DINA drains and is comfortable managing on her own  Objective:     /67 (BP Location: Right arm)   Pulse 74   Temp 98 2 °F (36 8 °C) (Oral)   Resp 18   Ht 5' 6 5" (1 689 m)   Wt 88 kg (194 lb)   SpO2 93%   BMI 30 84 kg/m²       Intake/Output Summary (Last 24 hours) at 7/3/2019 0659  Last data filed at 7/3/2019 0301  Gross per 24 hour   Intake 1120 ml   Output 480 ml   Net 640 ml       Invasive Devices     Peripheral Intravenous Line            Peripheral IV 03/18/18 Left Wrist 471 days    Peripheral IV 07/02/19 Right Hand less than 1 day          Drain            Closed/Suction Drain Left Breast 19 Fr  less than 1 day    Closed/Suction Drain Left Breast Bulb 19 Fr  less than 1 day                Physical Exam:  GEN: NAD  HEENT: MMM  CV: RRR  Lung: normal effort  Breast: s/p L mastectomy w/ steri-strips in place  C/d/i  x2 DINA drains with serosanguinous drainage  Ab: Soft, NT/ND  Extrem: No CCE  Neuro:  A+Ox3, motor and sensation grossly intact      Current Facility-Administered Medications:     acetaminophen (TYLENOL) tablet 650 mg, 650 mg, Oral, Q6H PRN, Zain Galloway MD    HYDROmorphone (DILAUDID) injection 0 2 mg, 0 2 mg, Intravenous, Q3H PRN, Zain Galloway MD    lactated ringers infusion, 125 mL/hr, Intravenous, Continuous, Tameka Houser MD    lactated ringers infusion, 75 mL/hr, Intravenous, Continuous, [place], CRNA, Last Rate: 75 mL/hr at 07/03/19 0143, 75 mL/hr at 07/03/19 0143    losartan potassium-hydrochlorothiazide (HYZAAR 50/12  5) combination, , Oral, Daily, Shaquille Fiore MD    ondansetron [facility] injection 4 mg, 4 mg, Intravenous, Q6H PRN, Shaquille Fiore MD    oxyCODONE (ROXICODONE) IR tablet 2 5 mg, 2 5 mg, Oral, Q4H PRN, Shaquille Fiore MD    oxyCODONE (ROXICODONE) IR tablet 5 mg, 5 mg, Oral, Q4H PRN, Shaquille Fiore MD              Lab, Imaging and other studies:I have personally reviewed pertinent lab results  , CBC:   Lab Results   Component Value Date    WBC 9 24 07/03/2019    HGB 8 9 (L) 07/03/2019    HCT 29 4 (L) 07/03/2019    MCV 93 07/03/2019     07/03/2019    MCH 28 3 07/03/2019    MCHC 30 3 (L) 07/03/2019    RDW 13 8 07/03/2019    MPV 9 2 07/03/2019   , CMP: No results found for: SODIUM, K, CL, CO2, ANIONGAP, BUN, CREATININE, GLUCOSE, CALCIUM, AST, ALT, ALKPHOS, PROT, BILITOT, EGFR  Labs in chart were reviewed    Lab Results   Component Value Date    WBC 9 24 07/03/2019    HGB 8 9 (L) 07/03/2019    HCT 29 4 (L) 07/03/2019     07/03/2019     Lab Results   Component Value Date    K 3 6 06/20/2019     06/20/2019    CO2 30 06/20/2019    BUN 15 06/20/2019    CREATININE 0 95 06/20/2019       VTE Mechanical Prophylaxis: sequential compression device          Patient Active Problem List   Diagnosis    Hypertension    GERD (gastroesophageal reflux disease)    Esophagitis    Hiatal hernia    Ductal carcinoma in situ (DCIS) of left breast    Atypical lobular hyperplasia (ALH) of left breast        Kristie Ventura MD

## 2019-07-12 ENCOUNTER — OFFICE VISIT (OUTPATIENT)
Dept: SURGICAL ONCOLOGY | Facility: CLINIC | Age: 79
End: 2019-07-12

## 2019-07-12 VITALS
WEIGHT: 187 LBS | BODY MASS INDEX: 29.35 KG/M2 | HEIGHT: 67 IN | HEART RATE: 83 BPM | TEMPERATURE: 98.2 F | SYSTOLIC BLOOD PRESSURE: 140 MMHG | DIASTOLIC BLOOD PRESSURE: 80 MMHG | RESPIRATION RATE: 16 BRPM

## 2019-07-12 DIAGNOSIS — N60.92 ATYPICAL LOBULAR HYPERPLASIA (ALH) OF LEFT BREAST: ICD-10-CM

## 2019-07-12 DIAGNOSIS — D05.12 DUCTAL CARCINOMA IN SITU (DCIS) OF LEFT BREAST: Primary | ICD-10-CM

## 2019-07-12 DIAGNOSIS — Z90.12 STATUS POST LEFT MASTECTOMY: ICD-10-CM

## 2019-07-12 PROCEDURE — 99024 POSTOP FOLLOW-UP VISIT: CPT | Performed by: SURGERY

## 2019-07-12 PROCEDURE — 1124F ACP DISCUSS-NO DSCNMKR DOCD: CPT | Performed by: SURGERY

## 2019-07-12 NOTE — PROGRESS NOTES
Surgical Oncology Follow Up       [address]  [facility]  [address] [facility] [place] [address]    Christine Leroy  1940  [id number]  [address]  [facility]  [address] [place] [address]    Chief Complaint   Patient presents with    Breast Cancer     Pt is here for post op           Assessment & Plan:     Patient presents for a follow-up visit for her left mastectomy for ductal carcinoma in situ  Her margins were negative for node was negative  We removed 1 of her drains today the 2nd 1 is still putting out approximately 50 cc a day and we will re-evaluate her next week  I have recommended she see Medical Oncology for an opinion regarding adjuvant hormonal therapy  Once her  Last drain is removed we will see her back in 3 months  Cancer History:        Ductal carcinoma in situ (DCIS) of left breast    5/23/2019 Biopsy     Left breast ultrasound-guided biopsy  10 o'clock, 4 cm from nipple  Ductal Carcinoma in Situ  Grade 2    FL 90      6/13/2019 Biopsy     Left breast biopsy of additional suspicious area  Lower inner quadrant, posterior portion, 8 cm from nipple  Single focus of atypical lobular hyperplasia      7/2/2019 Surgery     Left breast mastectomy with sentinel lymph node biopsy  Ductal carcinoma in situ  4 5 cm  Grade 2  Margins negative  0/1 Lymph nodes  Stage 0           Interval History:     See above    Review of Systems:   Review of Systems   All other systems reviewed and are negative        Past Medical History     Patient Active Problem List   Diagnosis    Hypertension    GERD (gastroesophageal reflux disease)    Esophagitis    Hiatal hernia    Ductal carcinoma in situ (DCIS) of left breast    Atypical lobular hyperplasia (ALH) of left breast     Past Medical History:   Diagnosis Date    Asthma     as a child    Esophagitis     GERD (gastroesophageal reflux disease)     Hypertension      Past Surgical History:   Procedure Laterality Date    BREAST LUMPECTOMY Left 1978    DENTAL SURGERY      ESOPHAGOGASTRODUODENOSCOPY N/A 3/19/2018    Procedure: ESOPHAGOGASTRODUODENOSCOPY (EGD); Surgeon: Dionisio Mcdonald MD;  Location: [facility]; Service: Gastroenterology    MAMMO STEREOTACTIC BREAST BIOPSY LEFT (ALL INC) Left 6/13/2019    MASTECTOMY W/ SENTINEL NODE BIOPSY Left 7/2/2019    Procedure: BREAST MASTECTOMY WITH SENTINEL LYMPH NODE BIOPSY, LYMPHATIC MAPPING WITH BLUE DYE AND RADIOACTIVE DYE (INJECT AT 0900 BY DR HARDING IN THE OR);   Surgeon: Eleanor Marrero MD;  [facility];  Service: Surgical Oncology    TONSILLECTOMY      US GUIDED BREAST BIOPSY LEFT COMPLETE Left 5/23/2019     Family History   Problem Relation Age of Onset    No Known Problems Mother     No Known Problems Father     No Known Problems Sister     Multiple sclerosis Daughter     No Known Problems Sister     Multiple sclerosis Sister     Multiple sclerosis Sister     No Known Problems Sister     No Known Problems Sister     No Known Problems Maternal Aunt     No Known Problems Maternal Aunt     No Known Problems Maternal Aunt     No Known Problems Maternal Aunt     No Known Problems Maternal Aunt     No Known Problems Maternal Aunt     No Known Problems Maternal Grandmother     No Known Problems Maternal Grandfather     No Known Problems Paternal Grandmother     No Known Problems Paternal Grandfather      Social History     Socioeconomic History    Marital status: /Civil Union     Spouse name: Not on file    Number of children: Not on file    Years of education: Not on file    Highest education level: Not on file   Occupational History    Not on file   Social Needs    Financial resource strain: Not on file    Food insecurity:     Worry: Not on file     Inability: Not on file    Transportation needs:     Medical: Not on file     Non-medical: Not on file   Tobacco Use  Smoking status: Former Smoker     Packs/day: 0 50     Years: 20 00     Pack years: 10 00     Last attempt to quit: 2013     Years since quittin 5    Smokeless tobacco: Never Used   Substance and Sexual Activity    Alcohol use: No    Drug use: No    Sexual activity: Not on file   Lifestyle    Physical activity:     Days per week: Not on file     Minutes per session: Not on file    Stress: Not on file   Relationships    Social connections:     Talks on phone: Not on file     Gets together: Not on file     Attends Caodaism service: Not on file     Active member of club or organization: Not on file     Attends meetings of clubs or organizations: Not on file     Relationship status: Not on file    Intimate partner violence:     Fear of current or ex partner: Not on file     Emotionally abused: Not on file     Physically abused: Not on file     Forced sexual activity: Not on file   Other Topics Concern    Not on file   Social History Narrative    Not on file       Current Outpatient Medications:     Cholecalciferol (VITAMIN D PO), Take by mouth, Disp: , Rfl:     famotidine (PEPCID) 40 MG tablet, Take 40 mg by mouth as needed , Disp: , Rfl:     losartan-hydrochlorothiazide (HYZAAR) 50-12 5 mg per tablet, Take 1 tablet by mouth daily in the early morning , Disp: , Rfl:     potassium chloride (K-DUR,KLOR-CON) 20 mEq tablet, Take 20 mEq by mouth 2 (two) times a day, Disp: , Rfl:     oxyCODONE-acetaminophen (PERCOCET) 5-325 mg per tablet, Take 1 tablet by mouth every 6 (six) hours as needed for moderate painMax Daily Amount: 4 tablets (Patient not taking: Reported on 2019), Disp: 12 tablet, Rfl: 0  No Known Allergies    Physical Exam:     Vitals:    19 1315   BP: 140/80   Pulse: 83   Resp: 16   Temp: 98 2 °F (36 8 °C)     Physical Exam   Pulmonary/Chest:    Examination of the left mastectomy site demonstrates  Relatively firm and large hematoma the lateral aspect of the incision which is distended the skin somewhat  There is no evidence of infection  Results/  Discussion:     I reviewed the pathology with the patient and provided her a copy of the report  I did explain that we would further evaluate her lateral excess tissue in the future if it is troubling her  I further clarify that I am sending her to Medical Oncology for educational session regarding the pros and cons of adjuvant hormonal therapy for DCIS  Advance Care Planning/Advance Directives:  I discussed the disease status, treatment plans and follow-up with the patient

## 2019-07-15 ENCOUNTER — TELEPHONE (OUTPATIENT)
Dept: SURGICAL ONCOLOGY | Facility: CLINIC | Age: 79
End: 2019-07-15

## 2019-07-15 NOTE — TELEPHONE ENCOUNTER
Called patient regarding surgical drain output  Patient states that her output over the weekend is as follows: Saturday 55 mL, Sunday 42 mL  Explained to patient that the drain should be kept in for additional time to limit any potential complications  Patient verbalized understanding  Instructed patient to expect a phone call later in the week to review output

## 2019-07-17 ENCOUNTER — TELEPHONE (OUTPATIENT)
Dept: SURGICAL ONCOLOGY | Facility: CLINIC | Age: 79
End: 2019-07-17

## 2019-07-17 NOTE — TELEPHONE ENCOUNTER
Called patient regarding surgical drain output  Patient stated that on Monday there was 40 mL, Tuesday, 22 mL and overnight 20 mL  Explained to patient the importance of not removing the surgical drain too early  Instructed patient to expect a phone call early next week to review the output again  Patient verbalized understanding and denies any questions at this time

## 2019-07-22 ENCOUNTER — TELEPHONE (OUTPATIENT)
Dept: SURGICAL ONCOLOGY | Facility: CLINIC | Age: 79
End: 2019-07-22

## 2019-07-22 NOTE — TELEPHONE ENCOUNTER
Called patient to follow up with surgical drain output  Patient states that her drain output has been 20 mL for the past 2 days and before that, 28 mL and 30 mL in a 24 hour period  Instructed patient to come to the office tomorrow morning to have her drain removed  Patient verbalized understanding and denies any questions or concerns at this time

## 2019-07-23 ENCOUNTER — DOCUMENTATION (OUTPATIENT)
Dept: SURGICAL ONCOLOGY | Facility: CLINIC | Age: 79
End: 2019-07-23

## 2019-07-23 DIAGNOSIS — Z90.12 HISTORY OF LEFT MASTECTOMY: ICD-10-CM

## 2019-07-23 DIAGNOSIS — D05.12 DUCTAL CARCINOMA IN SITU (DCIS) OF LEFT BREAST: Primary | ICD-10-CM

## 2019-07-23 NOTE — PROGRESS NOTES
Patient presented to the office for a surgical drain pull  On assessment, surgical site was firm to touch with minor swelling  Milky yellow drainage was present in surgical drain  Patient stated that the area has not changed on self-exam since her surgery  MAGNOLIA Loomis and Dr Jonathon Boothe assessed the site and approved drain removal  Suture was removed and drain pulled without complications  Site cleaned and dressing applied  Instructed patient to call the office if there is any redness or swelling from surgical site or any fevers  Patient verbalized understanding  Handout provided to patient  Patient denies any questions or concerns

## 2019-10-11 PROBLEM — Z86.000 HISTORY OF DUCTAL CARCINOMA IN SITU (DCIS) OF BREAST: Status: ACTIVE | Noted: 2019-06-04

## 2019-10-11 PROBLEM — N60.92 ATYPICAL LOBULAR HYPERPLASIA (ALH) OF LEFT BREAST: Status: RESOLVED | Noted: 2019-06-18 | Resolved: 2019-10-11

## 2019-10-16 ENCOUNTER — OFFICE VISIT (OUTPATIENT)
Dept: SURGICAL ONCOLOGY | Facility: CLINIC | Age: 79
End: 2019-10-16
Payer: MEDICARE

## 2019-10-16 VITALS
RESPIRATION RATE: 16 BRPM | SYSTOLIC BLOOD PRESSURE: 140 MMHG | BODY MASS INDEX: 29.66 KG/M2 | HEIGHT: 67 IN | HEART RATE: 65 BPM | DIASTOLIC BLOOD PRESSURE: 80 MMHG | TEMPERATURE: 96.3 F | WEIGHT: 189 LBS

## 2019-10-16 DIAGNOSIS — Z08 ENCOUNTER FOR FOLLOW-UP SURVEILLANCE OF DUCTAL CARCINOMA IN SITU OF BREAST: ICD-10-CM

## 2019-10-16 DIAGNOSIS — Z86.000 HISTORY OF DUCTAL CARCINOMA IN SITU (DCIS) OF BREAST: Primary | ICD-10-CM

## 2019-10-16 DIAGNOSIS — Z86.000 ENCOUNTER FOR FOLLOW-UP SURVEILLANCE OF DUCTAL CARCINOMA IN SITU OF BREAST: ICD-10-CM

## 2019-10-16 PROCEDURE — 99214 OFFICE O/P EST MOD 30 MIN: CPT | Performed by: SURGERY

## 2019-10-16 NOTE — PROGRESS NOTES
Surgical Oncology Follow Up       [address] [facility],[address] [facility]  CANCER CARE [facility] SURGICAL ONCOLOGY [place]  [address] [facility] [address]    Myrl Begun  1940  [id number]  [address] [facility],[address] [place] [address]  CANCER CARE [facility] SURGICAL ONCOLOGY [place]  [address] [facility] [address]    Chief Complaint   Patient presents with    Breast Cancer     Pt is here for 3 month f/u          Assessment & Plan:   Patient presents for a three-month follow-up visit  She has no complaints referable to her mastectomy site other than is still somewhat numb the lateral aspect and she still has some residual adipose tissue in this region  Clinical exam shows no evidence of local regional or distant recurrence disease  At her last visit we discussed seeing Medical Oncology however at the conclusion of that visit the patient stated that she was not going to take the medicine and did not have the appointment  She persist with this opinion that we offer the medical oncology consult  She is due for a right mammogram in May which will coordinate for her  She will be breast self aware and contact us should she have any changes in her mastectomy site or her contralateral breast     Cancer History:        History of ductal carcinoma in situ (DCIS) of breast    5/23/2019 Biopsy     Left breast ultrasound-guided biopsy  10 o'clock, 4 cm from nipple  Ductal Carcinoma in Situ  Grade 2    MI 90      6/13/2019 Biopsy     Left breast biopsy of additional suspicious area  Lower inner quadrant, posterior portion, 8 cm from nipple  Single focus of atypical lobular hyperplasia      7/2/2019 Surgery     Left breast mastectomy with sentinel lymph node biopsy  Ductal carcinoma in situ  4 5 cm  Grade 2  Margins negative  0/1 Lymph nodes  Stage 0           Interval History:   See Assessment & Plan    Review of Systems:   Review of Systems   Constitutional: Negative for activity change, appetite change and fatigue     HENT: Negative  Eyes: Negative  Respiratory: Negative for cough, shortness of breath and wheezing  Cardiovascular: Negative for chest pain and leg swelling  Gastrointestinal: Negative  Endocrine: Negative  Genitourinary: Negative  Musculoskeletal:        No new changes or complaints of bone pain   Skin: Negative  Allergic/Immunologic: Negative  Neurological: Negative  Hematological: Negative  Psychiatric/Behavioral: Negative  Past Medical History     Patient Active Problem List   Diagnosis    Hypertension    GERD (gastroesophageal reflux disease)    Esophagitis    Hiatal hernia    History of ductal carcinoma in situ (DCIS) of breast     Past Medical History:   Diagnosis Date    Asthma     as a child    Esophagitis     GERD (gastroesophageal reflux disease)     Hypertension      Past Surgical History:   Procedure Laterality Date    BREAST LUMPECTOMY Left 1978    DENTAL SURGERY      ESOPHAGOGASTRODUODENOSCOPY N/A 3/19/2018    Procedure: ESOPHAGOGASTRODUODENOSCOPY (EGD); Surgeon: Efren Cranker, MD;  Location: [facility]; Service: Gastroenterology    MAMMO STEREOTACTIC BREAST BIOPSY LEFT (ALL INC) Left 6/13/2019    MASTECTOMY W/ SENTINEL NODE BIOPSY Left 7/2/2019    Procedure: BREAST MASTECTOMY WITH SENTINEL LYMPH NODE BIOPSY, LYMPHATIC MAPPING WITH BLUE DYE AND RADIOACTIVE DYE (INJECT AT 0900 BY DR HARDING IN THE OR);   Surgeon: Leticia Garcia MD;  [facility];  Service: Surgical Oncology    TONSILLECTOMY      US GUIDED BREAST BIOPSY LEFT COMPLETE Left 5/23/2019     Family History   Problem Relation Age of Onset    No Known Problems Mother     No Known Problems Father     No Known Problems Sister     Multiple sclerosis Daughter     No Known Problems Sister     Multiple sclerosis Sister     Multiple sclerosis Sister     No Known Problems Sister     No Known Problems Sister     No Known Problems Maternal Aunt     No Known Problems Maternal Aunt     No Known Problems Maternal Aunt     No Known Problems Maternal Aunt     No Known Problems Maternal Aunt     No Known Problems Maternal Aunt     No Known Problems Maternal Grandmother     No Known Problems Maternal Grandfather     No Known Problems Paternal Grandmother     No Known Problems Paternal Grandfather      Social History     Socioeconomic History    Marital status: /Civil Union     Spouse name: Not on file    Number of children: Not on file    Years of education: Not on file    Highest education level: Not on file   Occupational History    Not on file   Social Needs    Financial resource strain: Not on file    Food insecurity:     Worry: Not on file     Inability: Not on file    Transportation needs:     Medical: Not on file     Non-medical: Not on file   Tobacco Use    Smoking status: Former Smoker     Packs/day: 0 50     Years: 20 00     Pack years: 10 00     Last attempt to quit:      Years since quittin 7    Smokeless tobacco: Never Used   Substance and Sexual Activity    Alcohol use: No    Drug use: No    Sexual activity: Not on file   Lifestyle    Physical activity:     Days per week: Not on file     Minutes per session: Not on file    Stress: Not on file   Relationships    Social connections:     Talks on phone: Not on file     Gets together: Not on file     Attends [employer] service: Not on file     Active member of club or organization: Not on file     Attends meetings of clubs or organizations: Not on file     Relationship status: Not on file    Intimate partner violence:     Fear of current or ex partner: Not on file     Emotionally abused: Not on file     Physically abused: Not on file     Forced sexual activity: Not on file   Other Topics Concern    Not on file   Social History Narrative    Not on file       Current Outpatient Medications:     Cholecalciferol (VITAMIN D PO), Take by mouth, Disp: , Rfl:     famotidine (PEPCID) 40 MG tablet, Take 40 mg by mouth as needed , Disp: , Rfl:     losartan-hydrochlorothiazide (HYZAAR) 50-12 5 mg per tablet, Take 1 tablet by mouth daily in the early morning , Disp: , Rfl:     potassium chloride (K-DUR,KLOR-CON) 20 mEq tablet, Take 20 mEq by mouth 2 (two) times a day, Disp: , Rfl:     oxyCODONE-acetaminophen (PERCOCET) 5-325 mg per tablet, Take 1 tablet by mouth every 6 (six) hours as needed for moderate painMax Daily Amount: 4 tablets (Patient not taking: Reported on 7/12/2019), Disp: 12 tablet, Rfl: 0  No Known Allergies    Physical Exam:     Vitals:    10/16/19 0828   BP: 140/80   Pulse: 65   Resp: 16   Temp: (!) 96 3 °F (35 7 °C)     Physical Exam   Constitutional: She is oriented to person, place, and time  She appears well-developed and well-nourished  HENT:   Head: Normocephalic and atraumatic  Mouth/Throat: Oropharynx is clear and moist    Eyes: Pupils are equal, round, and reactive to light  EOM are normal    Neck: Normal range of motion  Neck supple  No JVD present  No tracheal deviation present  No thyromegaly present  Cardiovascular: Normal rate, regular rhythm, normal heart sounds and intact distal pulses  Exam reveals no gallop and no friction rub  No murmur heard  Pulmonary/Chest: Effort normal and breath sounds normal  No respiratory distress  She has no wheezes  She has no rales  Examination of the right breast in both the sitting and supine position demonstrate no worrisome skin findings dominant masses or axillary adenopathy  Examination of the left mastectomy site shows no worrisome skin findings masses or axillary adenopathy  She does have slightly increased adipose tissue in the lateral aspect of her incision which is stable  Abdominal: Soft  She exhibits no distension and no mass  There is no hepatomegaly  There is no tenderness  There is no rebound and no guarding  Musculoskeletal: Normal range of motion  She exhibits no edema or tenderness     Lymphadenopathy:     She has no cervical adenopathy  Neurological: She is alert and oriented to person, place, and time  No cranial nerve deficit  Skin: Skin is warm and dry  No rash noted  No erythema  Psychiatric: She has a normal mood and affect  Her behavior is normal    Vitals reviewed  Results & Discussion:   The patient has no evidence of local regional distant recurrence disease  We will coordinate her imaging as outlined above  She is agreeable to see our advanced practitioner follow-up visit  Advance Care Planning/Advance Directives:  I discussed the disease status, treatment plans and follow-up with the patient

## 2020-05-26 ENCOUNTER — HOSPITAL ENCOUNTER (OUTPATIENT)
Dept: ULTRASOUND IMAGING | Facility: CLINIC | Age: 80
Discharge: HOME | End: 2020-05-26
Payer: MEDICARE

## 2020-05-26 ENCOUNTER — TELEPHONE (OUTPATIENT)
Dept: SURGICAL ONCOLOGY | Facility: CLINIC | Age: 80
End: 2020-05-26

## 2020-05-26 ENCOUNTER — HOSPITAL ENCOUNTER (OUTPATIENT)
Dept: MAMMOGRAPHY | Facility: CLINIC | Age: 80
Discharge: HOME | End: 2020-05-26
Payer: MEDICARE

## 2020-05-26 VITALS — WEIGHT: 189 LBS | HEIGHT: 66 IN | BODY MASS INDEX: 30.37 KG/M2

## 2020-05-26 DIAGNOSIS — Z86.000 HISTORY OF DUCTAL CARCINOMA IN SITU (DCIS) OF BREAST: ICD-10-CM

## 2020-05-26 DIAGNOSIS — R92.8 ABNORMAL MAMMOGRAM: ICD-10-CM

## 2020-05-26 PROCEDURE — 76642 ULTRASOUND BREAST LIMITED: CPT

## 2020-05-26 PROCEDURE — G0279 TOMOSYNTHESIS, MAMMO: HCPCS

## 2020-05-26 PROCEDURE — 77065 DX MAMMO INCL CAD UNI: CPT

## 2020-05-28 ENCOUNTER — OFFICE VISIT (OUTPATIENT)
Dept: SURGICAL ONCOLOGY | Facility: CLINIC | Age: 80
End: 2020-05-28
Payer: MEDICARE

## 2020-05-28 VITALS
TEMPERATURE: 96.7 F | RESPIRATION RATE: 16 BRPM | BODY MASS INDEX: 28.25 KG/M2 | HEART RATE: 95 BPM | SYSTOLIC BLOOD PRESSURE: 144 MMHG | DIASTOLIC BLOOD PRESSURE: 90 MMHG | HEIGHT: 67 IN | WEIGHT: 180 LBS

## 2020-05-28 DIAGNOSIS — Z86.000 HISTORY OF DUCTAL CARCINOMA IN SITU (DCIS) OF BREAST: ICD-10-CM

## 2020-05-28 DIAGNOSIS — Z08 ENCOUNTER FOR FOLLOW-UP EXAMINATION AFTER COMPLETED TREATMENT FOR MALIGNANT NEOPLASM: Primary | ICD-10-CM

## 2020-05-28 PROCEDURE — 99213 OFFICE O/P EST LOW 20 MIN: CPT | Performed by: NURSE PRACTITIONER

## 2020-10-13 DIAGNOSIS — I10 ESSENTIAL HYPERTENSION, BENIGN: Primary | ICD-10-CM

## 2020-10-13 RX ORDER — LOSARTAN POTASSIUM AND HYDROCHLOROTHIAZIDE 12.5; 5 MG/1; MG/1
1 TABLET ORAL
Qty: 90 TABLET | Refills: 0 | Status: SHIPPED | OUTPATIENT
Start: 2020-10-13 | End: 2021-04-23 | Stop reason: SDUPTHER

## 2020-10-27 ENCOUNTER — OFFICE VISIT (OUTPATIENT)
Dept: INTERNAL MEDICINE CLINIC | Facility: CLINIC | Age: 80
End: 2020-10-27
Payer: MEDICARE

## 2020-10-27 VITALS
WEIGHT: 192 LBS | SYSTOLIC BLOOD PRESSURE: 138 MMHG | TEMPERATURE: 97.8 F | HEIGHT: 66 IN | HEART RATE: 88 BPM | BODY MASS INDEX: 30.86 KG/M2 | DIASTOLIC BLOOD PRESSURE: 86 MMHG

## 2020-10-27 DIAGNOSIS — I10 ESSENTIAL HYPERTENSION: Primary | ICD-10-CM

## 2020-10-27 DIAGNOSIS — K44.9 HIATAL HERNIA: ICD-10-CM

## 2020-10-27 DIAGNOSIS — C50.012 MALIGNANT NEOPLASM OF AREOLA OF LEFT BREAST IN FEMALE, UNSPECIFIED ESTROGEN RECEPTOR STATUS (HCC): ICD-10-CM

## 2020-10-27 DIAGNOSIS — K21.9 GASTROESOPHAGEAL REFLUX DISEASE WITHOUT ESOPHAGITIS: ICD-10-CM

## 2020-10-27 DIAGNOSIS — M54.42 LOW BACK PAIN DUE TO BILATERAL SCIATICA: ICD-10-CM

## 2020-10-27 DIAGNOSIS — M54.41 LOW BACK PAIN DUE TO BILATERAL SCIATICA: ICD-10-CM

## 2020-10-27 DIAGNOSIS — Z00.00 MEDICARE ANNUAL WELLNESS VISIT, INITIAL: ICD-10-CM

## 2020-10-27 PROBLEM — C50.919 BREAST CANCER (HCC): Status: ACTIVE | Noted: 2019-06-13

## 2020-10-27 PROCEDURE — 99214 OFFICE O/P EST MOD 30 MIN: CPT | Performed by: INTERNAL MEDICINE

## 2020-10-27 PROCEDURE — G0438 PPPS, INITIAL VISIT: HCPCS | Performed by: INTERNAL MEDICINE

## 2020-11-03 ENCOUNTER — EVALUATION (OUTPATIENT)
Dept: PHYSICAL THERAPY | Age: 80
End: 2020-11-03
Payer: MEDICARE

## 2020-11-03 DIAGNOSIS — M54.42 LOW BACK PAIN DUE TO BILATERAL SCIATICA: Primary | ICD-10-CM

## 2020-11-03 DIAGNOSIS — M54.41 LOW BACK PAIN DUE TO BILATERAL SCIATICA: Primary | ICD-10-CM

## 2020-11-03 PROCEDURE — 97162 PT EVAL MOD COMPLEX 30 MIN: CPT | Performed by: PHYSICAL THERAPIST

## 2020-11-03 PROCEDURE — 97110 THERAPEUTIC EXERCISES: CPT | Performed by: PHYSICAL THERAPIST

## 2020-11-05 ENCOUNTER — OFFICE VISIT (OUTPATIENT)
Dept: PHYSICAL THERAPY | Age: 80
End: 2020-11-05
Payer: MEDICARE

## 2020-11-05 DIAGNOSIS — M54.41 LOW BACK PAIN DUE TO BILATERAL SCIATICA: Primary | ICD-10-CM

## 2020-11-05 DIAGNOSIS — M54.42 LOW BACK PAIN DUE TO BILATERAL SCIATICA: Primary | ICD-10-CM

## 2020-11-05 PROCEDURE — 97110 THERAPEUTIC EXERCISES: CPT | Performed by: PHYSICAL THERAPIST

## 2020-11-09 ENCOUNTER — APPOINTMENT (OUTPATIENT)
Dept: PHYSICAL THERAPY | Age: 80
End: 2020-11-09
Payer: MEDICARE

## 2020-11-12 ENCOUNTER — OFFICE VISIT (OUTPATIENT)
Dept: PHYSICAL THERAPY | Age: 80
End: 2020-11-12
Payer: MEDICARE

## 2020-11-12 DIAGNOSIS — M54.42 LOW BACK PAIN DUE TO BILATERAL SCIATICA: Primary | ICD-10-CM

## 2020-11-12 DIAGNOSIS — M54.41 LOW BACK PAIN DUE TO BILATERAL SCIATICA: Primary | ICD-10-CM

## 2020-11-12 PROCEDURE — 97110 THERAPEUTIC EXERCISES: CPT | Performed by: PHYSICAL THERAPIST

## 2020-11-12 PROCEDURE — 97112 NEUROMUSCULAR REEDUCATION: CPT | Performed by: PHYSICAL THERAPIST

## 2020-11-16 ENCOUNTER — OFFICE VISIT (OUTPATIENT)
Dept: PHYSICAL THERAPY | Age: 80
End: 2020-11-16
Payer: MEDICARE

## 2020-11-16 DIAGNOSIS — M54.42 LOW BACK PAIN DUE TO BILATERAL SCIATICA: Primary | ICD-10-CM

## 2020-11-16 DIAGNOSIS — M54.41 LOW BACK PAIN DUE TO BILATERAL SCIATICA: Primary | ICD-10-CM

## 2020-11-16 PROCEDURE — 97110 THERAPEUTIC EXERCISES: CPT

## 2020-11-18 ENCOUNTER — LAB (OUTPATIENT)
Dept: LAB | Age: 80
End: 2020-11-18
Payer: MEDICARE

## 2020-11-18 DIAGNOSIS — I10 ESSENTIAL HYPERTENSION: ICD-10-CM

## 2020-11-18 LAB
ALBUMIN SERPL BCP-MCNC: 3.7 G/DL (ref 3.5–5)
ALP SERPL-CCNC: 129 U/L (ref 46–116)
ALT SERPL W P-5'-P-CCNC: 17 U/L (ref 12–78)
ANION GAP SERPL CALCULATED.3IONS-SCNC: 7 MMOL/L (ref 4–13)
AST SERPL W P-5'-P-CCNC: 12 U/L (ref 5–45)
BASOPHILS # BLD AUTO: 0.05 THOUSANDS/ΜL (ref 0–0.1)
BASOPHILS NFR BLD AUTO: 1 % (ref 0–1)
BILIRUB SERPL-MCNC: 0.79 MG/DL (ref 0.2–1)
BUN SERPL-MCNC: 17 MG/DL (ref 5–25)
CALCIUM SERPL-MCNC: 9.3 MG/DL (ref 8.3–10.1)
CHLORIDE SERPL-SCNC: 105 MMOL/L (ref 100–108)
CHOLEST SERPL-MCNC: 256 MG/DL (ref 50–200)
CO2 SERPL-SCNC: 28 MMOL/L (ref 21–32)
CREAT SERPL-MCNC: 0.86 MG/DL (ref 0.6–1.3)
EOSINOPHIL # BLD AUTO: 0.09 THOUSAND/ΜL (ref 0–0.61)
EOSINOPHIL NFR BLD AUTO: 1 % (ref 0–6)
ERYTHROCYTE [DISTWIDTH] IN BLOOD BY AUTOMATED COUNT: 14.1 % (ref 11.6–15.1)
GFR SERPL CREATININE-BSD FRML MDRD: 75 ML/MIN/1.73SQ M
GLUCOSE P FAST SERPL-MCNC: 94 MG/DL (ref 65–99)
HCT VFR BLD AUTO: 35.8 % (ref 34.8–46.1)
HDLC SERPL-MCNC: 59 MG/DL
HGB BLD-MCNC: 11.1 G/DL (ref 11.5–15.4)
IMM GRANULOCYTES # BLD AUTO: 0.02 THOUSAND/UL (ref 0–0.2)
IMM GRANULOCYTES NFR BLD AUTO: 0 % (ref 0–2)
LDLC SERPL CALC-MCNC: 175 MG/DL (ref 0–100)
LYMPHOCYTES # BLD AUTO: 3.12 THOUSANDS/ΜL (ref 0.6–4.47)
LYMPHOCYTES NFR BLD AUTO: 36 % (ref 14–44)
MCH RBC QN AUTO: 29.1 PG (ref 26.8–34.3)
MCHC RBC AUTO-ENTMCNC: 31 G/DL (ref 31.4–37.4)
MCV RBC AUTO: 94 FL (ref 82–98)
MONOCYTES # BLD AUTO: 0.49 THOUSAND/ΜL (ref 0.17–1.22)
MONOCYTES NFR BLD AUTO: 6 % (ref 4–12)
NEUTROPHILS # BLD AUTO: 4.82 THOUSANDS/ΜL (ref 1.85–7.62)
NEUTS SEG NFR BLD AUTO: 56 % (ref 43–75)
NRBC BLD AUTO-RTO: 0 /100 WBCS
PLATELET # BLD AUTO: 315 THOUSANDS/UL (ref 149–390)
PMV BLD AUTO: 9.7 FL (ref 8.9–12.7)
POTASSIUM SERPL-SCNC: 3.7 MMOL/L (ref 3.5–5.3)
PROT SERPL-MCNC: 7.7 G/DL (ref 6.4–8.2)
RBC # BLD AUTO: 3.82 MILLION/UL (ref 3.81–5.12)
SODIUM SERPL-SCNC: 140 MMOL/L (ref 136–145)
TRIGL SERPL-MCNC: 112 MG/DL
TSH SERPL DL<=0.05 MIU/L-ACNC: 0.89 UIU/ML (ref 0.36–3.74)
WBC # BLD AUTO: 8.59 THOUSAND/UL (ref 4.31–10.16)

## 2020-11-18 PROCEDURE — 80061 LIPID PANEL: CPT

## 2020-11-18 PROCEDURE — 85025 COMPLETE CBC W/AUTO DIFF WBC: CPT

## 2020-11-18 PROCEDURE — 84443 ASSAY THYROID STIM HORMONE: CPT

## 2020-11-18 PROCEDURE — 80053 COMPREHEN METABOLIC PANEL: CPT

## 2020-11-18 PROCEDURE — 36415 COLL VENOUS BLD VENIPUNCTURE: CPT

## 2020-11-19 ENCOUNTER — OFFICE VISIT (OUTPATIENT)
Dept: PHYSICAL THERAPY | Age: 80
End: 2020-11-19
Payer: MEDICARE

## 2020-11-19 DIAGNOSIS — M54.41 LOW BACK PAIN DUE TO BILATERAL SCIATICA: Primary | ICD-10-CM

## 2020-11-19 DIAGNOSIS — M54.42 LOW BACK PAIN DUE TO BILATERAL SCIATICA: Primary | ICD-10-CM

## 2020-11-19 PROCEDURE — 97110 THERAPEUTIC EXERCISES: CPT | Performed by: PHYSICAL THERAPIST

## 2020-11-23 ENCOUNTER — APPOINTMENT (OUTPATIENT)
Dept: PHYSICAL THERAPY | Age: 80
End: 2020-11-23
Payer: MEDICARE

## 2020-11-30 ENCOUNTER — APPOINTMENT (OUTPATIENT)
Dept: PHYSICAL THERAPY | Age: 80
End: 2020-11-30
Payer: MEDICARE

## 2020-12-01 ENCOUNTER — OFFICE VISIT (OUTPATIENT)
Dept: PHYSICAL THERAPY | Age: 80
End: 2020-12-01
Payer: MEDICARE

## 2020-12-01 DIAGNOSIS — M54.41 LOW BACK PAIN DUE TO BILATERAL SCIATICA: Primary | ICD-10-CM

## 2020-12-01 DIAGNOSIS — M54.42 LOW BACK PAIN DUE TO BILATERAL SCIATICA: Primary | ICD-10-CM

## 2020-12-01 PROCEDURE — 97110 THERAPEUTIC EXERCISES: CPT

## 2020-12-03 ENCOUNTER — EVALUATION (OUTPATIENT)
Dept: PHYSICAL THERAPY | Age: 80
End: 2020-12-03
Payer: MEDICARE

## 2020-12-03 DIAGNOSIS — M54.41 LOW BACK PAIN DUE TO BILATERAL SCIATICA: Primary | ICD-10-CM

## 2020-12-03 DIAGNOSIS — M54.42 LOW BACK PAIN DUE TO BILATERAL SCIATICA: Primary | ICD-10-CM

## 2020-12-03 PROCEDURE — 97110 THERAPEUTIC EXERCISES: CPT | Performed by: PHYSICAL THERAPIST

## 2020-12-08 ENCOUNTER — OFFICE VISIT (OUTPATIENT)
Dept: PHYSICAL THERAPY | Age: 80
End: 2020-12-08
Payer: MEDICARE

## 2020-12-08 DIAGNOSIS — M54.42 LOW BACK PAIN DUE TO BILATERAL SCIATICA: Primary | ICD-10-CM

## 2020-12-08 DIAGNOSIS — M54.41 LOW BACK PAIN DUE TO BILATERAL SCIATICA: Primary | ICD-10-CM

## 2020-12-08 PROCEDURE — 97110 THERAPEUTIC EXERCISES: CPT

## 2020-12-09 ENCOUNTER — OFFICE VISIT (OUTPATIENT)
Dept: SURGICAL ONCOLOGY | Facility: CLINIC | Age: 80
End: 2020-12-09
Payer: MEDICARE

## 2020-12-09 VITALS
DIASTOLIC BLOOD PRESSURE: 90 MMHG | SYSTOLIC BLOOD PRESSURE: 142 MMHG | HEIGHT: 66 IN | BODY MASS INDEX: 30.86 KG/M2 | RESPIRATION RATE: 16 BRPM | WEIGHT: 192 LBS | TEMPERATURE: 98.1 F | HEART RATE: 70 BPM

## 2020-12-09 DIAGNOSIS — Z08 ENCOUNTER FOR FOLLOW-UP EXAMINATION AFTER COMPLETED TREATMENT FOR MALIGNANT NEOPLASM: Primary | ICD-10-CM

## 2020-12-09 DIAGNOSIS — Z86.000 HISTORY OF DUCTAL CARCINOMA IN SITU (DCIS) OF BREAST: ICD-10-CM

## 2020-12-09 PROCEDURE — 99213 OFFICE O/P EST LOW 20 MIN: CPT | Performed by: NURSE PRACTITIONER

## 2020-12-09 RX ORDER — MULTIVIT WITH MINERALS/LUTEIN
1000 TABLET ORAL DAILY
COMMUNITY

## 2020-12-10 ENCOUNTER — APPOINTMENT (OUTPATIENT)
Dept: PHYSICAL THERAPY | Age: 80
End: 2020-12-10
Payer: MEDICARE

## 2020-12-11 ENCOUNTER — PATIENT OUTREACH (OUTPATIENT)
Dept: CASE MANAGEMENT | Facility: HOSPITAL | Age: 80
End: 2020-12-11

## 2020-12-15 ENCOUNTER — APPOINTMENT (OUTPATIENT)
Dept: PHYSICAL THERAPY | Age: 80
End: 2020-12-15
Payer: MEDICARE

## 2020-12-17 ENCOUNTER — APPOINTMENT (OUTPATIENT)
Dept: PHYSICAL THERAPY | Age: 80
End: 2020-12-17
Payer: MEDICARE

## 2020-12-21 ENCOUNTER — APPOINTMENT (OUTPATIENT)
Dept: PHYSICAL THERAPY | Age: 80
End: 2020-12-21
Payer: MEDICARE

## 2020-12-23 ENCOUNTER — APPOINTMENT (OUTPATIENT)
Dept: PHYSICAL THERAPY | Age: 80
End: 2020-12-23
Payer: MEDICARE

## 2020-12-28 ENCOUNTER — APPOINTMENT (OUTPATIENT)
Dept: PHYSICAL THERAPY | Age: 80
End: 2020-12-28
Payer: MEDICARE

## 2020-12-31 ENCOUNTER — APPOINTMENT (OUTPATIENT)
Dept: PHYSICAL THERAPY | Age: 80
End: 2020-12-31
Payer: MEDICARE

## 2021-01-20 DIAGNOSIS — Z23 ENCOUNTER FOR IMMUNIZATION: ICD-10-CM

## 2021-02-26 ENCOUNTER — IMMUNIZATIONS (OUTPATIENT)
Dept: FAMILY MEDICINE CLINIC | Facility: HOSPITAL | Age: 81
End: 2021-02-26

## 2021-02-26 DIAGNOSIS — Z23 ENCOUNTER FOR IMMUNIZATION: Primary | ICD-10-CM

## 2021-02-26 PROCEDURE — 0001A SARS-COV-2 / COVID-19 MRNA VACCINE (PFIZER-BIONTECH) 30 MCG: CPT

## 2021-02-26 PROCEDURE — 91300 SARS-COV-2 / COVID-19 MRNA VACCINE (PFIZER-BIONTECH) 30 MCG: CPT

## 2021-03-17 ENCOUNTER — IMMUNIZATIONS (OUTPATIENT)
Dept: FAMILY MEDICINE CLINIC | Facility: HOSPITAL | Age: 81
End: 2021-03-17

## 2021-03-17 DIAGNOSIS — Z23 ENCOUNTER FOR IMMUNIZATION: Primary | ICD-10-CM

## 2021-03-17 PROCEDURE — 91300 SARS-COV-2 / COVID-19 MRNA VACCINE (PFIZER-BIONTECH) 30 MCG: CPT

## 2021-03-17 PROCEDURE — 0002A SARS-COV-2 / COVID-19 MRNA VACCINE (PFIZER-BIONTECH) 30 MCG: CPT

## 2021-04-23 ENCOUNTER — OFFICE VISIT (OUTPATIENT)
Dept: INTERNAL MEDICINE CLINIC | Facility: CLINIC | Age: 81
End: 2021-04-23
Payer: MEDICARE

## 2021-04-23 VITALS
OXYGEN SATURATION: 98 % | WEIGHT: 188 LBS | HEIGHT: 66 IN | TEMPERATURE: 97.9 F | BODY MASS INDEX: 30.22 KG/M2 | HEART RATE: 74 BPM | DIASTOLIC BLOOD PRESSURE: 80 MMHG | SYSTOLIC BLOOD PRESSURE: 142 MMHG

## 2021-04-23 DIAGNOSIS — E78.2 MIXED HYPERLIPIDEMIA: ICD-10-CM

## 2021-04-23 DIAGNOSIS — M54.42 LOW BACK PAIN DUE TO BILATERAL SCIATICA: ICD-10-CM

## 2021-04-23 DIAGNOSIS — I10 ESSENTIAL HYPERTENSION: Primary | ICD-10-CM

## 2021-04-23 DIAGNOSIS — K21.9 GASTROESOPHAGEAL REFLUX DISEASE WITHOUT ESOPHAGITIS: ICD-10-CM

## 2021-04-23 DIAGNOSIS — I10 ESSENTIAL HYPERTENSION, BENIGN: ICD-10-CM

## 2021-04-23 DIAGNOSIS — L85.3 DRY SKIN: ICD-10-CM

## 2021-04-23 DIAGNOSIS — M54.41 LOW BACK PAIN DUE TO BILATERAL SCIATICA: ICD-10-CM

## 2021-04-23 DIAGNOSIS — K44.9 HIATAL HERNIA: ICD-10-CM

## 2021-04-23 DIAGNOSIS — J30.2 SEASONAL ALLERGIES: ICD-10-CM

## 2021-04-23 DIAGNOSIS — C50.012 MALIGNANT NEOPLASM OF AREOLA OF LEFT BREAST IN FEMALE, UNSPECIFIED ESTROGEN RECEPTOR STATUS (HCC): ICD-10-CM

## 2021-04-23 PROCEDURE — 99214 OFFICE O/P EST MOD 30 MIN: CPT | Performed by: INTERNAL MEDICINE

## 2021-04-23 RX ORDER — ROSUVASTATIN CALCIUM 10 MG/1
10 TABLET, COATED ORAL DAILY
Qty: 30 TABLET | Refills: 1 | Status: SHIPPED | OUTPATIENT
Start: 2021-04-23 | End: 2021-08-06

## 2021-04-23 RX ORDER — LOSARTAN POTASSIUM AND HYDROCHLOROTHIAZIDE 12.5; 5 MG/1; MG/1
1 TABLET ORAL
Qty: 90 TABLET | Refills: 1 | Status: SHIPPED | OUTPATIENT
Start: 2021-04-23 | End: 2021-11-05 | Stop reason: SDUPTHER

## 2021-04-23 RX ORDER — FAMOTIDINE 40 MG/1
40 TABLET, FILM COATED ORAL
Qty: 90 TABLET | Refills: 1 | Status: SHIPPED | OUTPATIENT
Start: 2021-04-23 | End: 2022-06-10

## 2021-05-19 ENCOUNTER — HOSPITAL ENCOUNTER (OUTPATIENT)
Dept: RADIOLOGY | Facility: HOSPITAL | Age: 81
Discharge: HOME | End: 2021-05-19
Attending: INTERNAL MEDICINE
Payer: MEDICARE

## 2021-05-19 ENCOUNTER — TRANSCRIBE ORDERS (OUTPATIENT)
Dept: RADIOLOGY | Facility: HOSPITAL | Age: 81
End: 2021-05-19

## 2021-05-19 DIAGNOSIS — M54.42 LOW BACK PAIN DUE TO BILATERAL SCIATICA: ICD-10-CM

## 2021-05-19 DIAGNOSIS — M54.41 LOW BACK PAIN DUE TO BILATERAL SCIATICA: ICD-10-CM

## 2021-05-19 PROCEDURE — 72100 X-RAY EXAM L-S SPINE 2/3 VWS: CPT

## 2021-05-27 ENCOUNTER — HOSPITAL ENCOUNTER (OUTPATIENT)
Dept: MAMMOGRAPHY | Facility: CLINIC | Age: 81
Discharge: HOME | End: 2021-05-27
Payer: MEDICARE

## 2021-05-27 VITALS — HEIGHT: 66 IN | WEIGHT: 188 LBS | BODY MASS INDEX: 30.22 KG/M2

## 2021-05-27 DIAGNOSIS — Z86.000 HISTORY OF DUCTAL CARCINOMA IN SITU (DCIS) OF BREAST: ICD-10-CM

## 2021-05-27 PROCEDURE — G0279 TOMOSYNTHESIS, MAMMO: HCPCS

## 2021-05-27 PROCEDURE — 77065 DX MAMMO INCL CAD UNI: CPT

## 2021-06-02 ENCOUNTER — TELEPHONE (OUTPATIENT)
Dept: SURGICAL ONCOLOGY | Facility: CLINIC | Age: 81
End: 2021-06-02

## 2021-08-05 ENCOUNTER — APPOINTMENT (OUTPATIENT)
Dept: LAB | Age: 81
End: 2021-08-05
Payer: MEDICARE

## 2021-08-05 DIAGNOSIS — I10 ESSENTIAL HYPERTENSION: ICD-10-CM

## 2021-08-05 DIAGNOSIS — E78.2 MIXED HYPERLIPIDEMIA: ICD-10-CM

## 2021-08-05 LAB
ALBUMIN SERPL BCP-MCNC: 3.3 G/DL (ref 3.5–5)
ALP SERPL-CCNC: 120 U/L (ref 46–116)
ALT SERPL W P-5'-P-CCNC: 22 U/L (ref 12–78)
ANION GAP SERPL CALCULATED.3IONS-SCNC: 7 MMOL/L (ref 4–13)
AST SERPL W P-5'-P-CCNC: 15 U/L (ref 5–45)
BASOPHILS # BLD AUTO: 0.03 THOUSANDS/ΜL (ref 0–0.1)
BASOPHILS NFR BLD AUTO: 1 % (ref 0–1)
BILIRUB SERPL-MCNC: 1.09 MG/DL (ref 0.2–1)
BUN SERPL-MCNC: 12 MG/DL (ref 5–25)
CALCIUM ALBUM COR SERPL-MCNC: 9.6 MG/DL (ref 8.3–10.1)
CALCIUM SERPL-MCNC: 9 MG/DL (ref 8.3–10.1)
CHLORIDE SERPL-SCNC: 103 MMOL/L (ref 100–108)
CHOLEST SERPL-MCNC: 264 MG/DL (ref 50–200)
CO2 SERPL-SCNC: 29 MMOL/L (ref 21–32)
CREAT SERPL-MCNC: 0.9 MG/DL (ref 0.6–1.3)
EOSINOPHIL # BLD AUTO: 0.16 THOUSAND/ΜL (ref 0–0.61)
EOSINOPHIL NFR BLD AUTO: 2 % (ref 0–6)
ERYTHROCYTE [DISTWIDTH] IN BLOOD BY AUTOMATED COUNT: 14.3 % (ref 11.6–15.1)
GFR SERPL CREATININE-BSD FRML MDRD: 71 ML/MIN/1.73SQ M
GLUCOSE P FAST SERPL-MCNC: 98 MG/DL (ref 65–99)
HCT VFR BLD AUTO: 35.3 % (ref 34.8–46.1)
HDLC SERPL-MCNC: 52 MG/DL
HGB BLD-MCNC: 11.2 G/DL (ref 11.5–15.4)
IMM GRANULOCYTES # BLD AUTO: 0.02 THOUSAND/UL (ref 0–0.2)
IMM GRANULOCYTES NFR BLD AUTO: 0 % (ref 0–2)
LDLC SERPL CALC-MCNC: 186 MG/DL (ref 0–100)
LYMPHOCYTES # BLD AUTO: 2.36 THOUSANDS/ΜL (ref 0.6–4.47)
LYMPHOCYTES NFR BLD AUTO: 36 % (ref 14–44)
MCH RBC QN AUTO: 29.2 PG (ref 26.8–34.3)
MCHC RBC AUTO-ENTMCNC: 31.7 G/DL (ref 31.4–37.4)
MCV RBC AUTO: 92 FL (ref 82–98)
MONOCYTES # BLD AUTO: 0.46 THOUSAND/ΜL (ref 0.17–1.22)
MONOCYTES NFR BLD AUTO: 7 % (ref 4–12)
NEUTROPHILS # BLD AUTO: 3.59 THOUSANDS/ΜL (ref 1.85–7.62)
NEUTS SEG NFR BLD AUTO: 54 % (ref 43–75)
NRBC BLD AUTO-RTO: 0 /100 WBCS
PLATELET # BLD AUTO: 317 THOUSANDS/UL (ref 149–390)
PMV BLD AUTO: 9.8 FL (ref 8.9–12.7)
POTASSIUM SERPL-SCNC: 3.5 MMOL/L (ref 3.5–5.3)
PROT SERPL-MCNC: 7.3 G/DL (ref 6.4–8.2)
RBC # BLD AUTO: 3.83 MILLION/UL (ref 3.81–5.12)
SODIUM SERPL-SCNC: 139 MMOL/L (ref 136–145)
TRIGL SERPL-MCNC: 132 MG/DL
TSH SERPL DL<=0.05 MIU/L-ACNC: 1.22 UIU/ML (ref 0.36–3.74)
WBC # BLD AUTO: 6.62 THOUSAND/UL (ref 4.31–10.16)

## 2021-08-05 PROCEDURE — 85025 COMPLETE CBC W/AUTO DIFF WBC: CPT

## 2021-08-05 PROCEDURE — 80053 COMPREHEN METABOLIC PANEL: CPT

## 2021-08-05 PROCEDURE — 80061 LIPID PANEL: CPT

## 2021-08-05 PROCEDURE — 84443 ASSAY THYROID STIM HORMONE: CPT

## 2021-08-05 PROCEDURE — 36415 COLL VENOUS BLD VENIPUNCTURE: CPT

## 2021-08-06 ENCOUNTER — OFFICE VISIT (OUTPATIENT)
Dept: INTERNAL MEDICINE CLINIC | Facility: CLINIC | Age: 81
End: 2021-08-06
Payer: MEDICARE

## 2021-08-06 VITALS
SYSTOLIC BLOOD PRESSURE: 134 MMHG | HEIGHT: 66 IN | WEIGHT: 186 LBS | OXYGEN SATURATION: 97 % | DIASTOLIC BLOOD PRESSURE: 68 MMHG | HEART RATE: 69 BPM | BODY MASS INDEX: 29.89 KG/M2 | TEMPERATURE: 98.2 F

## 2021-08-06 DIAGNOSIS — E78.2 MIXED HYPERLIPIDEMIA: ICD-10-CM

## 2021-08-06 DIAGNOSIS — C50.012 MALIGNANT NEOPLASM OF AREOLA OF LEFT BREAST IN FEMALE, UNSPECIFIED ESTROGEN RECEPTOR STATUS (HCC): ICD-10-CM

## 2021-08-06 DIAGNOSIS — M47.26 OSTEOARTHRITIS OF SPINE WITH RADICULOPATHY, LUMBAR REGION: ICD-10-CM

## 2021-08-06 DIAGNOSIS — K21.9 GASTROESOPHAGEAL REFLUX DISEASE WITHOUT ESOPHAGITIS: ICD-10-CM

## 2021-08-06 DIAGNOSIS — I10 ESSENTIAL HYPERTENSION: Primary | ICD-10-CM

## 2021-08-06 PROCEDURE — 99214 OFFICE O/P EST MOD 30 MIN: CPT | Performed by: INTERNAL MEDICINE

## 2021-08-06 RX ORDER — EZETIMIBE 10 MG/1
10 TABLET ORAL DAILY
Qty: 90 TABLET | Refills: 1 | Status: SHIPPED | OUTPATIENT
Start: 2021-08-06 | End: 2021-11-05 | Stop reason: SDUPTHER

## 2021-08-06 RX ORDER — MELOXICAM 7.5 MG/1
7.5 TABLET ORAL DAILY
Qty: 30 TABLET | Refills: 0 | Status: SHIPPED | OUTPATIENT
Start: 2021-08-06 | End: 2022-01-11

## 2021-08-06 NOTE — PROGRESS NOTES
Assessment/Plan:             1  Essential hypertension    2  Malignant neoplasm of areola of left breast in female, unspecified estrogen receptor status ([facility] [address] )    3  Mixed hyperlipidemia  -     ezetimibe (ZETIA) 10 mg tablet; Take 1 tablet (10 mg total) by mouth daily    4  Gastroesophageal reflux disease without esophagitis    5  Osteoarthritis of spine with radiculopathy, lumbar region  -     meloxicam (MOBIC) 7 5 mg tablet; Take 1 tablet (7 5 mg total) by mouth daily           Subjective:      Patient ID: Kerry Long is a 80 y o  female  Follow-up on blood test done yesterday discussed with her      The following portions of the patient's history were reviewed and updated as appropriate: She  has a past medical history of Asthma, [facility] ([facility]13/[facility]), Esophagitis, GERD (gastroesophageal reflux disease), and Hypertension  She   Patient Active Problem List    Diagnosis Date Noted    Osteoarthritis of spine with radiculopathy, lumbar region 08/06/2021    Essential hypertension 04/23/2021    Malignant neoplasm of areola of left breast in female [facility]) 04/23/2021    Gastroesophageal reflux disease without esophagitis 04/23/2021    Dry skin 04/23/2021    Seasonal allergies 04/23/2021    Mixed hyperlipidemia 04/23/2021    Essential hypertension, benign 04/23/2021    Medicare annual wellness visit, initial 10/27/2020    Body mass index 30 0-30 9, adult 10/27/2020    Low back pain due to bilateral sciatica 10/27/2020    Asthma     Encounter for follow-up examination after completed treatment for malignant neoplasm 05/28/2020    Breast cancer ([facility] [address] ) 06/13/2019    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    Hiatal hernia 03/19/2018    Hypertension 03/17/2018    GERD (gastroesophageal reflux disease) 03/17/2018    Esophagitis 03/17/2018     She  has a past surgical history that includes Tonsillectomy;  Dental surgery; Esophagogastroduodenoscopy (N/A, 3/19/2018); US guided breast biopsy left complete (Left, 5/23/2019); Mammo stereotactic breast biopsy left (all inc) (Left, 6/13/2019); Mastectomy w/ sentinel node biopsy (Left, 7/2/2019); Mastectomy (Left, 07/02/2019); and Breast lumpectomy (Left, 1978)  Her family history includes Alzheimer's disease in her mother; Dementia in her mother; Multiple sclerosis in her daughter, sister, and sister; No Known Problems in her father, maternal aunt, maternal aunt, maternal aunt, maternal aunt, maternal aunt, maternal aunt, maternal grandfather, maternal grandmother, paternal grandfather, paternal grandmother, sister, sister, sister, and sister  She  reports that she quit smoking about 8 years ago  She has a 10 00 pack-year smoking history  She has never used smokeless tobacco  She reports that she does not drink alcohol and does not use drugs  Current Outpatient Medications   Medication Sig Dispense Refill    Ascorbic Acid (vitamin C) 1000 MG tablet Take 1,000 mg by mouth daily      Cholecalciferol (VITAMIN D PO) Take by mouth      famotidine (PEPCID) 40 MG tablet Take 1 tablet (40 mg total) by mouth daily at bedtime as needed for indigestion 90 tablet 1    losartan-hydrochlorothiazide (HYZAAR) 50-12 5 mg per tablet Take 1 tablet by mouth daily in the early morning 90 tablet 1    potassium chloride (K-DUR,KLOR-CON) 20 mEq tablet Take 20 mEq by mouth 2 (two) times a day      ezetimibe (ZETIA) 10 mg tablet Take 1 tablet (10 mg total) by mouth daily 90 tablet 1    meloxicam (MOBIC) 7 5 mg tablet Take 1 tablet (7 5 mg total) by mouth daily 30 tablet 0     No current facility-administered medications for this visit       Current Outpatient Medications on File Prior to Visit   Medication Sig    Ascorbic Acid (vitamin C) 1000 MG tablet Take 1,000 mg by mouth daily    Cholecalciferol (VITAMIN D PO) Take by mouth    famotidine (PEPCID) 40 MG tablet Take 1 tablet (40 mg total) by mouth daily at bedtime as needed for indigestion    losartan-hydrochlorothiazide (HYZAAR) 50-12 5 mg per tablet Take 1 tablet by mouth daily in the early morning    potassium chloride (K-DUR,KLOR-CON) 20 mEq tablet Take 20 mEq by mouth 2 (two) times a day    [DISCONTINUED] rosuvastatin (CRESTOR) 10 MG tablet Take 1 tablet (10 mg total) by mouth daily (Patient not taking: Reported on 8/6/2021)     No current facility-administered medications on file prior to visit  She is allergic to amlodipine, lisinopril, and rosuvastatin       Review of Systems   Constitutional: Negative for chills and fever  HENT: Negative for congestion, ear pain and sore throat  Eyes: Negative for pain  Respiratory: Negative for cough and shortness of breath  Cardiovascular: Negative for chest pain and leg swelling  Gastrointestinal: Negative for abdominal pain, nausea and vomiting  Endocrine: Negative for polyuria  Genitourinary: Negative for difficulty urinating, frequency and urgency  Musculoskeletal: Positive for back pain  Negative for arthralgias  Skin: Negative for rash  Neurological: Negative for weakness and headaches  Psychiatric/Behavioral: Negative for sleep disturbance  The patient is not nervous/anxious            Objective:      /68 (BP Location: Right arm, Patient Position: Sitting, Cuff Size: Standard)   Pulse 69   Temp 98 2 °F (36 8 °C) (Temporal)   Ht 5' 6" (1 676 m)   Wt 84 4 kg (186 lb)   SpO2 97%   BMI 30 02 kg/m²     Recent Results (from the past 1344 hour(s))   CBC and differential    Collection Time: 08/05/21  3:51 PM   Result Value Ref Range    WBC 6 62 4 31 - 10 16 Thousand/uL    RBC 3 83 3 81 - 5 12 Million/uL    Hemoglobin 11 2 (L) 11 5 - 15 4 g/dL    Hematocrit 35 3 34 8 - 46 1 %    MCV 92 82 - 98 fL    MCH 29 2 26 8 - 34 3 pg    MCHC 31 7 31 4 - 37 4 g/dL    RDW 14 3 11 6 - 15 1 %    MPV 9 8 8 9 - 12 7 fL    Platelets 650 902 - 443 Thousands/uL    nRBC 0 /100 WBCs    Neutrophils Relative 54 43 - 75 %    Immat GRANS % 0 0 - 2 %    Lymphocytes Relative 36 14 - 44 %    Monocytes Relative 7 4 - 12 %    Eosinophils Relative 2 0 - 6 %    Basophils Relative 1 0 - 1 %    Neutrophils Absolute 3 59 1 85 - 7 62 Thousands/µL    Immature Grans Absolute 0 02 0 00 - 0 20 Thousand/uL    Lymphocytes Absolute 2 36 0 60 - 4 47 Thousands/µL    Monocytes Absolute 0 46 0 17 - 1 22 Thousand/µL    Eosinophils Absolute 0 16 0 00 - 0 61 Thousand/µL    Basophils Absolute 0 03 0 00 - 0 10 Thousands/µL   Comprehensive metabolic panel    Collection Time: 08/05/21  3:51 PM   Result Value Ref Range    Sodium 139 136 - 145 mmol/L    Potassium 3 5 3 5 - 5 3 mmol/L    Chloride 103 100 - 108 mmol/L    CO2 29 21 - 32 mmol/L    ANION GAP 7 4 - 13 mmol/L    BUN 12 5 - 25 mg/dL    Creatinine 0 90 0 60 - 1 30 mg/dL    Glucose, Fasting 98 65 - 99 mg/dL    Calcium 9 0 8 3 - 10 1 mg/dL    Corrected Calcium 9 6 8 3 - 10 1 mg/dL    AST 15 5 - 45 U/L    ALT 22 12 - 78 U/L    Alkaline Phosphatase 120 (H) 46 - 116 U/L    Total Protein 7 3 6 4 - 8 2 g/dL    Albumin 3 3 (L) 3 5 - 5 0 g/dL    Total Bilirubin 1 09 (H) 0 20 - 1 00 mg/dL    eGFR 71 ml/min/1 73sq m   Lipid Panel with Direct LDL reflex    Collection Time: 08/05/21  3:51 PM   Result Value Ref Range    Cholesterol 264 (H) 50 - 200 mg/dL    Triglycerides 132 <=150 mg/dL    HDL, Direct 52 >=40 mg/dL    LDL Calculated 186 (H) 0 - 100 mg/dL   TSH, 3rd generation    Collection Time: 08/05/21  3:51 PM   Result Value Ref Range    TSH 3RD GENERATON 1 220 0 358 - 3 740 uIU/mL        Physical Exam  Constitutional:       Appearance: Normal appearance  HENT:      Head: Normocephalic  Right Ear: Tympanic membrane, ear canal and external ear normal       Left Ear: Tympanic membrane, ear canal and external ear normal       Nose: Nose normal  No congestion  Mouth/Throat:      Mouth: Mucous membranes are moist       Pharynx: Oropharynx is clear  No oropharyngeal exudate or posterior oropharyngeal erythema     Eyes:      Extraocular Movements: Extraocular movements intact  Conjunctiva/sclera: Conjunctivae normal       Pupils: Pupils are equal, round, and reactive to light  Cardiovascular:      Rate and Rhythm: Normal rate and regular rhythm  Heart sounds: Normal heart sounds  No murmur heard  Pulmonary:      Effort: Pulmonary effort is normal       Breath sounds: Normal breath sounds  No wheezing or rales  Abdominal:      General: Bowel sounds are normal  There is no distension  Palpations: Abdomen is soft  Tenderness: There is no abdominal tenderness  Musculoskeletal:         General: Normal range of motion  Cervical back: Normal range of motion and neck supple  Right lower leg: No edema  Left lower leg: No edema  Lymphadenopathy:      Cervical: No cervical adenopathy  Skin:     General: Skin is warm  Neurological:      General: No focal deficit present  Mental Status: She is alert and oriented to person, place, and time

## 2021-11-05 ENCOUNTER — OFFICE VISIT (OUTPATIENT)
Dept: INTERNAL MEDICINE CLINIC | Facility: CLINIC | Age: 81
End: 2021-11-05
Payer: MEDICARE

## 2021-11-05 VITALS
HEIGHT: 66 IN | BODY MASS INDEX: 30.41 KG/M2 | DIASTOLIC BLOOD PRESSURE: 78 MMHG | OXYGEN SATURATION: 97 % | TEMPERATURE: 98.8 F | WEIGHT: 189.2 LBS | HEART RATE: 76 BPM | SYSTOLIC BLOOD PRESSURE: 132 MMHG

## 2021-11-05 DIAGNOSIS — E78.2 MIXED HYPERLIPIDEMIA: ICD-10-CM

## 2021-11-05 DIAGNOSIS — K21.9 GASTROESOPHAGEAL REFLUX DISEASE WITHOUT ESOPHAGITIS: ICD-10-CM

## 2021-11-05 DIAGNOSIS — Z00.00 MEDICARE ANNUAL WELLNESS VISIT, SUBSEQUENT: ICD-10-CM

## 2021-11-05 DIAGNOSIS — C50.012 MALIGNANT NEOPLASM OF AREOLA OF LEFT BREAST IN FEMALE, UNSPECIFIED ESTROGEN RECEPTOR STATUS (HCC): ICD-10-CM

## 2021-11-05 DIAGNOSIS — I10 ESSENTIAL HYPERTENSION: Primary | ICD-10-CM

## 2021-11-05 DIAGNOSIS — M47.26 OSTEOARTHRITIS OF SPINE WITH RADICULOPATHY, LUMBAR REGION: ICD-10-CM

## 2021-11-05 DIAGNOSIS — I10 ESSENTIAL HYPERTENSION, BENIGN: ICD-10-CM

## 2021-11-05 PROCEDURE — 99214 OFFICE O/P EST MOD 30 MIN: CPT | Performed by: INTERNAL MEDICINE

## 2021-11-05 PROCEDURE — 1123F ACP DISCUSS/DSCN MKR DOCD: CPT | Performed by: INTERNAL MEDICINE

## 2021-11-05 PROCEDURE — G0439 PPPS, SUBSEQ VISIT: HCPCS | Performed by: INTERNAL MEDICINE

## 2021-11-05 RX ORDER — LOSARTAN POTASSIUM AND HYDROCHLOROTHIAZIDE 12.5; 5 MG/1; MG/1
1 TABLET ORAL
Qty: 90 TABLET | Refills: 1 | Status: SHIPPED | OUTPATIENT
Start: 2021-11-05 | End: 2022-03-18 | Stop reason: SDUPTHER

## 2021-11-05 RX ORDER — EZETIMIBE 10 MG/1
10 TABLET ORAL DAILY
Qty: 90 TABLET | Refills: 1 | Status: SHIPPED | OUTPATIENT
Start: 2021-11-05 | End: 2022-06-10

## 2021-11-06 ENCOUNTER — IMMUNIZATIONS (OUTPATIENT)
Dept: FAMILY MEDICINE CLINIC | Facility: HOSPITAL | Age: 81
End: 2021-11-06

## 2021-11-06 DIAGNOSIS — Z23 ENCOUNTER FOR IMMUNIZATION: Primary | ICD-10-CM

## 2021-11-06 PROCEDURE — 91300 COVID-19 PFIZER VACC 0.3 ML: CPT

## 2021-11-06 PROCEDURE — 0001A COVID-19 PFIZER VACC 0.3 ML: CPT

## 2022-01-11 ENCOUNTER — OFFICE VISIT (OUTPATIENT)
Dept: INTERNAL MEDICINE CLINIC | Facility: CLINIC | Age: 82
End: 2022-01-11
Payer: MEDICARE

## 2022-01-11 VITALS
DIASTOLIC BLOOD PRESSURE: 72 MMHG | BODY MASS INDEX: 30.28 KG/M2 | SYSTOLIC BLOOD PRESSURE: 130 MMHG | HEART RATE: 84 BPM | WEIGHT: 188.4 LBS | OXYGEN SATURATION: 98 % | HEIGHT: 66 IN | TEMPERATURE: 98 F

## 2022-01-11 DIAGNOSIS — J30.2 SEASONAL ALLERGIES: ICD-10-CM

## 2022-01-11 DIAGNOSIS — C50.012 MALIGNANT NEOPLASM OF AREOLA OF LEFT BREAST IN FEMALE, UNSPECIFIED ESTROGEN RECEPTOR STATUS (HCC): ICD-10-CM

## 2022-01-11 DIAGNOSIS — R06.02 SOB (SHORTNESS OF BREATH): Primary | ICD-10-CM

## 2022-01-11 DIAGNOSIS — K21.9 GASTROESOPHAGEAL REFLUX DISEASE WITHOUT ESOPHAGITIS: ICD-10-CM

## 2022-01-11 DIAGNOSIS — M47.26 OSTEOARTHRITIS OF SPINE WITH RADICULOPATHY, LUMBAR REGION: ICD-10-CM

## 2022-01-11 DIAGNOSIS — E78.2 MIXED HYPERLIPIDEMIA: ICD-10-CM

## 2022-01-11 PROCEDURE — 99214 OFFICE O/P EST MOD 30 MIN: CPT | Performed by: INTERNAL MEDICINE

## 2022-01-11 NOTE — PROGRESS NOTES
Assessment/Plan:  GERD-progressively worsening heartburn occasionally relieved with Pepcid 40 mg p r n  We discussed the benefit of weight loss and also reflux precautions and she verbalized understanding  -  EGD in 2018 which showed mild esophagitis and small hernia, GI recommended Protonix 40 mg daily and reflux precautions  - will consider Pepcid 40 mg daily or start Protonix 20 mg daily, I discussed this with the patient and she is in agreement with the plan  Hypertension blood pressure today 130/72  Will continue current regimen  - losartan-hydrochlorothiazide 50-12 5 mg daily    Hyperlipidemia stable at this time continue Zetia10 mg daily        1  Gastroesophageal reflux disease without esophagitis    2  Malignant neoplasm of areola of left breast in female, unspecified estrogen receptor status ([facility] [address] )    3  Mixed hyperlipidemia    4  Osteoarthritis of spine with radiculopathy, lumbar region    5  Seasonal allergies    6  Body mass index 30 0-30 9, adult           Subjective:      Patient ID: Carissa Smith is a 80 y o  female  With a past medical history of hypertension, asthma, GERD,chronic low back pain presents due to concerns of worsening acid reflux  She is currently on Pepcid 40 mg p r n  Which occasionally relieves the pain  She had an EGD in 2018 which showed mild esophagitis and small hernia, GI recommended Protonix 40 mg daily and reflux precautions  BMI is 30 41 we discussed the benefits diet and exercise and she verbalized understanding  She admits to occasional chronic dyspnea while going up and down the stairs likely secondary to deconditioning  She denies headaches, lightheadedness, chest pain, palpitation, pain, N/V/C/D, melena, hematochezia, hematemesis, numbness tingling sensation of extremity patient looks stable not in any obvious distress on room air      HPI    The following portions of the patient's history were reviewed and updated as appropriate: She  has a past medical history of Asthma, Breast cancer ([facility] [address] ) (06/13/2019), Esophagitis, GERD (gastroesophageal reflux disease), and Hypertension  She   Patient Active Problem List    Diagnosis Date Noted    Osteoarthritis of spine with radiculopathy, lumbar region 08/06/2021    Essential hypertension 04/23/2021    Malignant neoplasm of areola of left breast in female [facility]) 04/23/2021    Gastroesophageal reflux disease without esophagitis 04/23/2021    Dry skin 04/23/2021    Seasonal allergies 04/23/2021    Mixed hyperlipidemia 04/23/2021    Essential hypertension, benign 04/23/2021    Medicare annual wellness visit, subsequent 10/27/2020    Body mass index 30 0-30 9, adult 10/27/2020    Low back pain due to bilateral sciatica 10/27/2020    Asthma     Encounter for follow-up examination after completed treatment for malignant neoplasm 05/28/2020    [facility] ([facility] ) 06/13/2019    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    Hiatal hernia 03/19/2018    Hypertension 03/17/2018    GERD (gastroesophageal reflux disease) 03/17/2018    Esophagitis 03/17/2018     She  has a past surgical history that includes Tonsillectomy; Dental surgery; Esophagogastroduodenoscopy (N/A, 3/19/2018); US guided breast biopsy left complete (Left, 5/23/2019); Mammo stereotactic breast biopsy left (all inc) (Left, 6/13/2019); Mastectomy w/ sentinel node biopsy (Left, 7/2/2019); Mastectomy (Left, 07/02/2019); and Breast lumpectomy (Left, 1978)  Her family history includes Alzheimer's disease in her mother; Dementia in her mother; Multiple sclerosis in her daughter, sister, and sister; No Known Problems in her father, maternal aunt, maternal aunt, maternal aunt, maternal aunt, maternal aunt, maternal aunt, maternal grandfather, maternal grandmother, paternal grandfather, paternal grandmother, sister, sister, sister, and sister  She  reports that she quit smoking about 9 years ago  She has a 10 00 pack-year smoking history  She has never used smokeless tobacco  She reports that she does not drink alcohol and does not use drugs  Current Outpatient Medications   Medication Sig Dispense Refill    Ascorbic Acid (vitamin C) 1000 MG tablet Take 1,000 mg by mouth daily      Cholecalciferol (VITAMIN D PO) Take 1,000 Units by mouth daily       ezetimibe (ZETIA) 10 mg tablet Take 1 tablet (10 mg total) by mouth daily 90 tablet 1    famotidine (PEPCID) 40 MG tablet Take 1 tablet (40 mg total) by mouth daily at bedtime as needed for indigestion 90 tablet 1    losartan-hydrochlorothiazide (HYZAAR) 50-12 5 mg per tablet Take 1 tablet by mouth daily in the early morning 90 tablet 1    meloxicam (MOBIC) 7 5 mg tablet Take 1 tablet (7 5 mg total) by mouth daily (Patient not taking: Reported on 11/5/2021) 30 tablet 0    potassium chloride (K-DUR,KLOR-CON) 20 mEq tablet Take 20 mEq by mouth 2 (two) times a day (Patient not taking: Reported on 11/5/2021)       No current facility-administered medications for this visit  Current Outpatient Medications on File Prior to Visit   Medication Sig    Ascorbic Acid (vitamin C) 1000 MG tablet Take 1,000 mg by mouth daily    Cholecalciferol (VITAMIN D PO) Take 1,000 Units by mouth daily     ezetimibe (ZETIA) 10 mg tablet Take 1 tablet (10 mg total) by mouth daily    famotidine (PEPCID) 40 MG tablet Take 1 tablet (40 mg total) by mouth daily at bedtime as needed for indigestion    losartan-hydrochlorothiazide (HYZAAR) 50-12 5 mg per tablet Take 1 tablet by mouth daily in the early morning    meloxicam (MOBIC) 7 5 mg tablet Take 1 tablet (7 5 mg total) by mouth daily (Patient not taking: Reported on 11/5/2021)    potassium chloride (K-DUR,KLOR-CON) 20 mEq tablet Take 20 mEq by mouth 2 (two) times a day (Patient not taking: Reported on 11/5/2021)     No current facility-administered medications on file prior to visit  She is allergic to amlodipine, lisinopril, and rosuvastatin       Review of Systems  12 point review of system unremarkable except that listed in my HPI above    Objective:      /72 (BP Location: Right arm, Patient Position: Sitting, Cuff Size: Large)   Pulse 84   Temp 98 °F (36 7 °C) (Temporal)   Ht 5' 6" (1 676 m)   Wt 85 5 kg (188 lb 6 4 oz)   SpO2 98% Comment: ra  BMI 30 41 kg/m²     No results found for this or any previous visit (from the past 1344 hour(s))  Physical Exam  Vitals and nursing note reviewed  Constitutional:       General: She is not in acute distress  Appearance: She is well-developed  She is obese  HENT:      Head: Normocephalic and atraumatic  Mouth/Throat:      Mouth: Mucous membranes are moist       Pharynx: Oropharynx is clear  Eyes:      General: No scleral icterus  Extraocular Movements: Extraocular movements intact  Conjunctiva/sclera: Conjunctivae normal    Cardiovascular:      Rate and Rhythm: Normal rate and regular rhythm  Pulses: Normal pulses  Heart sounds: No murmur heard  Pulmonary:      Effort: Pulmonary effort is normal  No respiratory distress  Breath sounds: Normal breath sounds  Abdominal:      General: Bowel sounds are normal       Palpations: Abdomen is soft  Tenderness: There is no abdominal tenderness  Musculoskeletal:         General: No swelling or tenderness  Normal range of motion  Cervical back: Normal range of motion and neck supple  Skin:     General: Skin is warm and dry  Capillary Refill: Capillary refill takes less than 2 seconds  Neurological:      General: No focal deficit present  Mental Status: She is alert and oriented to person, place, and time     Psychiatric:         Mood and Affect: Mood normal          Behavior: Behavior normal

## 2022-03-09 ENCOUNTER — HOSPITAL ENCOUNTER (OUTPATIENT)
Dept: NON INVASIVE DIAGNOSTICS | Facility: HOSPITAL | Age: 82
Discharge: HOME | End: 2022-03-09
Attending: INTERNAL MEDICINE
Payer: MEDICARE

## 2022-03-09 ENCOUNTER — HOSPITAL ENCOUNTER (OUTPATIENT)
Dept: RADIOLOGY | Facility: HOSPITAL | Age: 82
Discharge: HOME | End: 2022-03-09
Attending: INTERNAL MEDICINE
Payer: MEDICARE

## 2022-03-09 VITALS
HEIGHT: 66 IN | WEIGHT: 188 LBS | HEART RATE: 64 BPM | DIASTOLIC BLOOD PRESSURE: 72 MMHG | SYSTOLIC BLOOD PRESSURE: 130 MMHG | BODY MASS INDEX: 30.22 KG/M2

## 2022-03-09 DIAGNOSIS — R06.02 SOB (SHORTNESS OF BREATH): ICD-10-CM

## 2022-03-09 LAB
AORTIC ROOT: 2.6 CM
APICAL FOUR CHAMBER EJECTION FRACTION: 62 %
ASCENDING AORTA: 2.5 CM (ref 2.04–3.05)
E WAVE DECELERATION TIME: 189 MS
FRACTIONAL SHORTENING: 33 % (ref 28–44)
INTERVENTRICULAR SEPTUM IN DIASTOLE (PARASTERNAL SHORT AXIS VIEW): 0.8 CM
INTERVENTRICULAR SEPTUM: 0.8 CM (ref 0.53–1)
LAAS-AP2: 12.2 CM2
LAAS-AP4: 19.8 CM2
LEFT ATRIUM SIZE: 2.4 CM
LEFT INTERNAL DIMENSION IN SYSTOLE: 2.2 CM (ref 2.97–4.49)
LEFT VENTRICULAR INTERNAL DIMENSION IN DIASTOLE: 3.3 CM (ref 4.88–7.26)
LEFT VENTRICULAR POSTERIOR WALL IN END DIASTOLE: 0.9 CM (ref 0.52–0.99)
LEFT VENTRICULAR STROKE VOLUME: 29 ML
LVSV (TEICH): 29 ML
MV E'TISSUE VEL-SEP: 8 CM/S
MV PEAK A VEL: 0.95 M/S
MV PEAK E VEL: 65 CM/S
MV STENOSIS PRESSURE HALF TIME: 55 MS
MV VALVE AREA P 1/2 METHOD: 4 CM2
RIGHT ATRIUM AREA SYSTOLE A4C: 14 CM2
RIGHT VENTRICLE ID DIMENSION: 2.8 CM
SL CV LEFT ATRIUM LENGTH A2C: 4.1 CM
SL CV LV EF: 60
SL CV PED ECHO LEFT VENTRICLE DIASTOLIC VOLUME (MOD BIPLANE) 2D: 46 ML
SL CV PED ECHO LEFT VENTRICLE SYSTOLIC VOLUME (MOD BIPLANE) 2D: 17 ML
TR MAX PG: 24 MMHG
TR PEAK VELOCITY: 2.4 M/S
TRICUSPID VALVE PEAK REGURGITATION VELOCITY: 2.44 M/S
Z-SCORE OF ASCENDING AORTA: -0.17
Z-SCORE OF INTERVENTRICULAR SEPTUM IN END DIASTOLE: 0.27
Z-SCORE OF LEFT VENTRICULAR DIMENSION IN END DIASTOLE: -5.9
Z-SCORE OF LEFT VENTRICULAR DIMENSION IN END SYSTOLE: -4
Z-SCORE OF LEFT VENTRICULAR POSTERIOR WALL IN END DIASTOLE: 1.22

## 2022-03-09 PROCEDURE — 71046 X-RAY EXAM CHEST 2 VIEWS: CPT

## 2022-03-09 PROCEDURE — 93306 TTE W/DOPPLER COMPLETE: CPT

## 2022-03-09 PROCEDURE — 93306 TTE W/DOPPLER COMPLETE: CPT | Performed by: INTERNAL MEDICINE

## 2022-03-18 ENCOUNTER — OFFICE VISIT (OUTPATIENT)
Dept: INTERNAL MEDICINE CLINIC | Facility: CLINIC | Age: 82
End: 2022-03-18
Payer: MEDICARE

## 2022-03-18 VITALS
HEART RATE: 92 BPM | TEMPERATURE: 97.5 F | HEIGHT: 66 IN | WEIGHT: 188 LBS | BODY MASS INDEX: 30.22 KG/M2 | OXYGEN SATURATION: 98 % | DIASTOLIC BLOOD PRESSURE: 72 MMHG | SYSTOLIC BLOOD PRESSURE: 122 MMHG

## 2022-03-18 DIAGNOSIS — I10 ESSENTIAL HYPERTENSION, BENIGN: ICD-10-CM

## 2022-03-18 DIAGNOSIS — C50.012 MALIGNANT NEOPLASM OF AREOLA OF LEFT BREAST IN FEMALE, UNSPECIFIED ESTROGEN RECEPTOR STATUS (HCC): ICD-10-CM

## 2022-03-18 DIAGNOSIS — E78.2 MIXED HYPERLIPIDEMIA: ICD-10-CM

## 2022-03-18 DIAGNOSIS — R06.02 SOB (SHORTNESS OF BREATH): ICD-10-CM

## 2022-03-18 DIAGNOSIS — M47.26 OSTEOARTHRITIS OF SPINE WITH RADICULOPATHY, LUMBAR REGION: ICD-10-CM

## 2022-03-18 DIAGNOSIS — K21.9 GASTROESOPHAGEAL REFLUX DISEASE WITHOUT ESOPHAGITIS: ICD-10-CM

## 2022-03-18 DIAGNOSIS — I10 ESSENTIAL HYPERTENSION: Primary | ICD-10-CM

## 2022-03-18 PROCEDURE — 99214 OFFICE O/P EST MOD 30 MIN: CPT | Performed by: INTERNAL MEDICINE

## 2022-03-18 RX ORDER — LOSARTAN POTASSIUM AND HYDROCHLOROTHIAZIDE 12.5; 5 MG/1; MG/1
1 TABLET ORAL
Qty: 90 TABLET | Refills: 1 | Status: SHIPPED | OUTPATIENT
Start: 2022-03-18 | End: 2022-04-08

## 2022-03-18 NOTE — PROGRESS NOTES
Assessment/Plan:    BMI Counseling: Body mass index is 30 34 kg/m²  The BMI is above normal  Nutrition recommendations include decreasing portion sizes, encouraging healthy choices of fruits and vegetables and decreasing fast food intake  Exercise recommendations include moderate physical activity 150 minutes/week  Rationale for BMI follow-up plan is due to patient being overweight or obese  1  Essential hypertension    2  Mixed hyperlipidemia    3  Malignant neoplasm of areola of left breast in female, unspecified estrogen receptor status ([facility] [address] )    4  Gastroesophageal reflux disease without esophagitis    5  Osteoarthritis of spine with radiculopathy, lumbar region    6  SOB (shortness of breath)    7  Essential hypertension, benign  -     losartan-hydrochlorothiazide (HYZAAR) 50-12 5 mg per tablet; Take 1 tablet by mouth daily in the early morning           Subjective:      Patient ID: Ita Morrison is a 80 y o  female  Follow-up on x-ray and echo of the heart test discussed with her      The following portions of the patient's history were reviewed and updated as appropriate: She  has a past medical history of Asthma, Breast cancer ([facility] [address] ) (06/13/2019), Esophagitis, GERD (gastroesophageal reflux disease), and Hypertension    She   Patient Active Problem List    Diagnosis Date Noted    SOB (shortness of breath) 01/11/2022    Osteoarthritis of spine with radiculopathy, lumbar region 08/06/2021    Essential hypertension 04/23/2021    Malignant neoplasm of areola of left breast in female [facility]) 04/23/2021    Gastroesophageal reflux disease without esophagitis 04/23/2021    Dry skin 04/23/2021    Seasonal allergies 04/23/2021    Mixed hyperlipidemia 04/23/2021    Essential hypertension, benign 04/23/2021    Medicare annual wellness visit, subsequent 10/27/2020    Body mass index 30 0-30 9, adult 10/27/2020    Low back pain due to bilateral sciatica 10/27/2020    Asthma     Encounter for follow-up examination after completed treatment for malignant neoplasm 05/28/2020    [facility] [address] ) 06/13/2019    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    Hiatal hernia 03/19/2018    Hypertension 03/17/2018    GERD (gastroesophageal reflux disease) 03/17/2018    Esophagitis 03/17/2018     She  has a past surgical history that includes Tonsillectomy; Dental surgery; Esophagogastroduodenoscopy (N/A, 3/19/2018); US guided breast biopsy left complete (Left, 5/23/2019); Mammo stereotactic breast biopsy left (all inc) (Left, 6/13/2019); Mastectomy w/ sentinel node biopsy (Left, 7/2/2019); Mastectomy (Left, 07/02/2019); and Breast lumpectomy (Left, 1978)  Her family history includes Alzheimer's disease in her mother; Dementia in her mother; Multiple sclerosis in her daughter, sister, and sister; No Known Problems in her father, maternal aunt, maternal aunt, maternal aunt, maternal aunt, maternal aunt, maternal aunt, maternal grandfather, maternal grandmother, paternal grandfather, paternal grandmother, sister, sister, sister, and sister  She  reports that she quit smoking about 9 years ago  She has a 10 00 pack-year smoking history  She has never used smokeless tobacco  She reports that she does not drink alcohol and does not use drugs  Current Outpatient Medications   Medication Sig Dispense Refill    Ascorbic Acid (vitamin C) 1000 MG tablet Take 1,000 mg by mouth daily      Cholecalciferol (VITAMIN D PO) Take 1,000 Units by mouth daily       ezetimibe (ZETIA) 10 mg tablet Take 1 tablet (10 mg total) by mouth daily 90 tablet 1    famotidine (PEPCID) 40 MG tablet Take 1 tablet (40 mg total) by mouth daily at bedtime as needed for indigestion 90 tablet 1    losartan-hydrochlorothiazide (HYZAAR) 50-12 5 mg per tablet Take 1 tablet by mouth daily in the early morning 90 tablet 1     No current facility-administered medications for this visit       Current Outpatient Medications on File Prior to Visit   Medication Sig    Ascorbic Acid (vitamin C) 1000 MG tablet Take 1,000 mg by mouth daily    Cholecalciferol (VITAMIN D PO) Take 1,000 Units by mouth daily     ezetimibe (ZETIA) 10 mg tablet Take 1 tablet (10 mg total) by mouth daily    famotidine (PEPCID) 40 MG tablet Take 1 tablet (40 mg total) by mouth daily at bedtime as needed for indigestion    [DISCONTINUED] losartan-hydrochlorothiazide (HYZAAR) 50-12 5 mg per tablet Take 1 tablet by mouth daily in the early morning     No current facility-administered medications on file prior to visit  She is allergic to amlodipine, lisinopril, and rosuvastatin       Review of Systems   Constitutional: Negative for chills and fever  HENT: Negative for congestion, ear pain and sore throat  Eyes: Negative for pain  Respiratory: Positive for shortness of breath  Negative for cough  Cardiovascular: Negative for chest pain and leg swelling  Gastrointestinal: Negative for abdominal pain, nausea and vomiting  Endocrine: Negative for polyuria  Genitourinary: Negative for difficulty urinating, frequency and urgency  Musculoskeletal: Negative for arthralgias and back pain  Skin: Negative for rash  Neurological: Negative for weakness and headaches  Psychiatric/Behavioral: Negative for sleep disturbance  The patient is not nervous/anxious            Objective:      /72 (BP Location: Right arm, Patient Position: Sitting, Cuff Size: Adult)   Pulse 92   Temp 97 5 °F (36 4 °C) (Temporal)   Ht 5' 6" (1 676 m)   Wt 85 3 kg (188 lb)   SpO2 98%   BMI 30 34 kg/m²     Recent Results (from the past 1344 hour(s))   Echo complete w/ contrast if indicated    Collection Time: 03/09/22 11:28 AM   Result Value Ref Range    A4C EF 62 %    LVIDd 3 30 4 88 - 7 26 cm    LVIDS 2 20 2 97 - 4 49 cm    IVSd 0 80 cm    LVPWd 0 90 0 52 - 0 99 cm    FS 33 28 - 44 %    MV E' Tissue Velocity Septal 8 cm/s    E wave deceleration time 189 ms    MV Peak E Carlitos 65 cm/s    MV Peak A Carlitos 0 95 m/s    RVID d 2 8 cm    LA size 2 4 cm    LA length (A2C) 4 10 cm    RAA A4C 14 cm2    MV stenosis pressure 1/2 time 55 ms    MV valve area p 1/2 method 4 00 cm2    TR Peak Carlitos 2 4 m/s    Triscuspid Valve Regurgitation Peak Gradient 24 0 mmHg    Ao root 2 60 cm    Asc Ao 2 5 2 04 - 3 05 cm    Tricuspid valve peak regurgitation velocity 2 44 m/s    Left ventricular stroke volume (2D) 29 00 mL    IVS 0 8 0 53 - 1 00 cm    LEFT VENTRICLE SYSTOLIC VOLUME (MOD BIPLANE) 2D 17 mL    LV DIASTOLIC VOLUME (MOD BIPLANE) 2D 46 mL    Left Atrium Area-systolic Four Chamber 81 5 cm2    Left Atrium Area-systolic Apical Two Chamber 12 2 cm2    LVSV, 2D 29 mL    Ao asc z-score -0 17     ZLVPWD 1 22     ZLVIDS -4 00     ZLVIDD -5 90     ZIVSD 0 27     LV EF 60         Physical Exam  Constitutional:       Appearance: Normal appearance  HENT:      Head: Normocephalic  Right Ear: Tympanic membrane, ear canal and external ear normal       Left Ear: Tympanic membrane, ear canal and external ear normal       Nose: Nose normal  No congestion  Mouth/Throat:      Mouth: Mucous membranes are moist       Pharynx: Oropharynx is clear  No oropharyngeal exudate or posterior oropharyngeal erythema  Eyes:      Extraocular Movements: Extraocular movements intact  Conjunctiva/sclera: Conjunctivae normal       Pupils: Pupils are equal, round, and reactive to light  Cardiovascular:      Rate and Rhythm: Normal rate and regular rhythm  Heart sounds: Normal heart sounds  No murmur heard  Pulmonary:      Effort: Pulmonary effort is normal       Breath sounds: Normal breath sounds  No wheezing or rales  Abdominal:      General: Bowel sounds are normal  There is no distension  Palpations: Abdomen is soft  Tenderness: There is no abdominal tenderness  Musculoskeletal:         General: Normal range of motion  Cervical back: Normal range of motion and neck supple        Right lower leg: No edema  Left lower leg: No edema  Lymphadenopathy:      Cervical: No cervical adenopathy  Skin:     General: Skin is warm  Neurological:      General: No focal deficit present  Mental Status: She is alert and oriented to person, place, and time

## 2022-03-22 ENCOUNTER — APPOINTMENT (OUTPATIENT)
Dept: LAB | Age: 82
End: 2022-03-22
Payer: MEDICARE

## 2022-03-22 DIAGNOSIS — I10 ESSENTIAL HYPERTENSION: ICD-10-CM

## 2022-03-22 DIAGNOSIS — E78.2 MIXED HYPERLIPIDEMIA: ICD-10-CM

## 2022-03-22 LAB
ALBUMIN SERPL BCP-MCNC: 3.6 G/DL (ref 3.5–5)
ALP SERPL-CCNC: 110 U/L (ref 46–116)
ALT SERPL W P-5'-P-CCNC: 21 U/L (ref 12–78)
ANION GAP SERPL CALCULATED.3IONS-SCNC: 5 MMOL/L (ref 4–13)
AST SERPL W P-5'-P-CCNC: 15 U/L (ref 5–45)
BASOPHILS # BLD AUTO: 0.04 THOUSANDS/ΜL (ref 0–0.1)
BASOPHILS NFR BLD AUTO: 1 % (ref 0–1)
BILIRUB SERPL-MCNC: 1.08 MG/DL (ref 0.2–1)
BUN SERPL-MCNC: 19 MG/DL (ref 5–25)
CALCIUM SERPL-MCNC: 9.4 MG/DL (ref 8.3–10.1)
CHLORIDE SERPL-SCNC: 101 MMOL/L (ref 100–108)
CHOLEST SERPL-MCNC: 238 MG/DL
CO2 SERPL-SCNC: 30 MMOL/L (ref 21–32)
CREAT SERPL-MCNC: 1.12 MG/DL (ref 0.6–1.3)
EOSINOPHIL # BLD AUTO: 0.14 THOUSAND/ΜL (ref 0–0.61)
EOSINOPHIL NFR BLD AUTO: 2 % (ref 0–6)
ERYTHROCYTE [DISTWIDTH] IN BLOOD BY AUTOMATED COUNT: 15 % (ref 11.6–15.1)
GFR SERPL CREATININE-BSD FRML MDRD: 46 ML/MIN/1.73SQ M
GLUCOSE P FAST SERPL-MCNC: 106 MG/DL (ref 65–99)
HCT VFR BLD AUTO: 31.4 % (ref 34.8–46.1)
HDLC SERPL-MCNC: 48 MG/DL
HGB BLD-MCNC: 10.8 G/DL (ref 11.5–15.4)
IMM GRANULOCYTES # BLD AUTO: 0.03 THOUSAND/UL (ref 0–0.2)
IMM GRANULOCYTES NFR BLD AUTO: 0 % (ref 0–2)
LDLC SERPL CALC-MCNC: 163 MG/DL (ref 0–100)
LYMPHOCYTES # BLD AUTO: 1.96 THOUSANDS/ΜL (ref 0.6–4.47)
LYMPHOCYTES NFR BLD AUTO: 29 % (ref 14–44)
MCH RBC QN AUTO: 31.9 PG (ref 26.8–34.3)
MCHC RBC AUTO-ENTMCNC: 34.4 G/DL (ref 31.4–37.4)
MCV RBC AUTO: 93 FL (ref 82–98)
MONOCYTES # BLD AUTO: 0.4 THOUSAND/ΜL (ref 0.17–1.22)
MONOCYTES NFR BLD AUTO: 6 % (ref 4–12)
NEUTROPHILS # BLD AUTO: 4.26 THOUSANDS/ΜL (ref 1.85–7.62)
NEUTS SEG NFR BLD AUTO: 62 % (ref 43–75)
NRBC BLD AUTO-RTO: 0 /100 WBCS
PLATELET # BLD AUTO: 348 THOUSANDS/UL (ref 149–390)
PMV BLD AUTO: 9.2 FL (ref 8.9–12.7)
POTASSIUM SERPL-SCNC: 3.8 MMOL/L (ref 3.5–5.3)
PROT SERPL-MCNC: 7.7 G/DL (ref 6.4–8.2)
RBC # BLD AUTO: 3.39 MILLION/UL (ref 3.81–5.12)
SODIUM SERPL-SCNC: 136 MMOL/L (ref 136–145)
TRIGL SERPL-MCNC: 134 MG/DL
TSH SERPL DL<=0.05 MIU/L-ACNC: 0.69 UIU/ML (ref 0.36–3.74)
WBC # BLD AUTO: 6.83 THOUSAND/UL (ref 4.31–10.16)

## 2022-03-22 PROCEDURE — 85025 COMPLETE CBC W/AUTO DIFF WBC: CPT

## 2022-03-22 PROCEDURE — 36415 COLL VENOUS BLD VENIPUNCTURE: CPT

## 2022-03-22 PROCEDURE — 84443 ASSAY THYROID STIM HORMONE: CPT

## 2022-03-22 PROCEDURE — 80061 LIPID PANEL: CPT

## 2022-03-22 PROCEDURE — 80053 COMPREHEN METABOLIC PANEL: CPT

## 2022-03-24 DIAGNOSIS — I10 ESSENTIAL HYPERTENSION: Primary | ICD-10-CM

## 2022-04-07 ENCOUNTER — APPOINTMENT (OUTPATIENT)
Dept: LAB | Age: 82
End: 2022-04-07
Payer: MEDICARE

## 2022-04-07 DIAGNOSIS — I10 ESSENTIAL HYPERTENSION: ICD-10-CM

## 2022-04-07 LAB
ANION GAP SERPL CALCULATED.3IONS-SCNC: 4 MMOL/L (ref 4–13)
BUN SERPL-MCNC: 19 MG/DL (ref 5–25)
CALCIUM SERPL-MCNC: 9.6 MG/DL (ref 8.3–10.1)
CHLORIDE SERPL-SCNC: 102 MMOL/L (ref 100–108)
CO2 SERPL-SCNC: 31 MMOL/L (ref 21–32)
CREAT SERPL-MCNC: 1.18 MG/DL (ref 0.6–1.3)
GFR SERPL CREATININE-BSD FRML MDRD: 43 ML/MIN/1.73SQ M
GLUCOSE SERPL-MCNC: 100 MG/DL (ref 65–140)
POTASSIUM SERPL-SCNC: 3.5 MMOL/L (ref 3.5–5.3)
SODIUM SERPL-SCNC: 137 MMOL/L (ref 136–145)

## 2022-04-07 PROCEDURE — 36415 COLL VENOUS BLD VENIPUNCTURE: CPT

## 2022-04-07 PROCEDURE — 80048 BASIC METABOLIC PNL TOTAL CA: CPT

## 2022-04-08 ENCOUNTER — TELEPHONE (OUTPATIENT)
Dept: INTERNAL MEDICINE CLINIC | Facility: CLINIC | Age: 82
End: 2022-04-08

## 2022-04-08 DIAGNOSIS — I10 ESSENTIAL HYPERTENSION: ICD-10-CM

## 2022-04-08 DIAGNOSIS — I10 ESSENTIAL HYPERTENSION: Primary | ICD-10-CM

## 2022-04-08 DIAGNOSIS — C50.012 MALIGNANT NEOPLASM OF AREOLA OF LEFT BREAST IN FEMALE, UNSPECIFIED ESTROGEN RECEPTOR STATUS (HCC): Primary | ICD-10-CM

## 2022-04-08 RX ORDER — LOSARTAN POTASSIUM 50 MG/1
50 TABLET ORAL DAILY
Qty: 30 TABLET | Refills: 1 | Status: SHIPPED | OUTPATIENT
Start: 2022-04-08 | End: 2022-06-10

## 2022-04-08 NOTE — TELEPHONE ENCOUNTER
Patient viewed her lab work on [address] and has some questions about the lab work, asked if you would please call her

## 2022-04-27 ENCOUNTER — APPOINTMENT (OUTPATIENT)
Dept: LAB | Age: 82
End: 2022-04-27
Payer: MEDICARE

## 2022-04-27 DIAGNOSIS — I10 ESSENTIAL HYPERTENSION: ICD-10-CM

## 2022-04-27 LAB
ANION GAP SERPL CALCULATED.3IONS-SCNC: 6 MMOL/L (ref 4–13)
BASOPHILS # BLD AUTO: 0.04 THOUSANDS/ΜL (ref 0–0.1)
BASOPHILS NFR BLD AUTO: 1 % (ref 0–1)
BUN SERPL-MCNC: 12 MG/DL (ref 5–25)
CALCIUM SERPL-MCNC: 9.4 MG/DL (ref 8.3–10.1)
CHLORIDE SERPL-SCNC: 105 MMOL/L (ref 100–108)
CO2 SERPL-SCNC: 28 MMOL/L (ref 21–32)
CREAT SERPL-MCNC: 0.96 MG/DL (ref 0.6–1.3)
EOSINOPHIL # BLD AUTO: 0.07 THOUSAND/ΜL (ref 0–0.61)
EOSINOPHIL NFR BLD AUTO: 1 % (ref 0–6)
ERYTHROCYTE [DISTWIDTH] IN BLOOD BY AUTOMATED COUNT: 13.3 % (ref 11.6–15.1)
GFR SERPL CREATININE-BSD FRML MDRD: 56 ML/MIN/1.73SQ M
GLUCOSE SERPL-MCNC: 97 MG/DL (ref 65–140)
HCT VFR BLD AUTO: 37.1 % (ref 34.8–46.1)
HGB BLD-MCNC: 11.6 G/DL (ref 11.5–15.4)
IMM GRANULOCYTES # BLD AUTO: 0.03 THOUSAND/UL (ref 0–0.2)
IMM GRANULOCYTES NFR BLD AUTO: 0 % (ref 0–2)
LYMPHOCYTES # BLD AUTO: 2.46 THOUSANDS/ΜL (ref 0.6–4.47)
LYMPHOCYTES NFR BLD AUTO: 31 % (ref 14–44)
MCH RBC QN AUTO: 28.4 PG (ref 26.8–34.3)
MCHC RBC AUTO-ENTMCNC: 31.3 G/DL (ref 31.4–37.4)
MCV RBC AUTO: 91 FL (ref 82–98)
MONOCYTES # BLD AUTO: 0.34 THOUSAND/ΜL (ref 0.17–1.22)
MONOCYTES NFR BLD AUTO: 4 % (ref 4–12)
NEUTROPHILS # BLD AUTO: 4.9 THOUSANDS/ΜL (ref 1.85–7.62)
NEUTS SEG NFR BLD AUTO: 63 % (ref 43–75)
NRBC BLD AUTO-RTO: 0 /100 WBCS
PLATELET # BLD AUTO: 311 THOUSANDS/UL (ref 149–390)
PMV BLD AUTO: 9.2 FL (ref 8.9–12.7)
POTASSIUM SERPL-SCNC: 3.7 MMOL/L (ref 3.5–5.3)
RBC # BLD AUTO: 4.08 MILLION/UL (ref 3.81–5.12)
SODIUM SERPL-SCNC: 139 MMOL/L (ref 136–145)
WBC # BLD AUTO: 7.84 THOUSAND/UL (ref 4.31–10.16)

## 2022-04-27 PROCEDURE — 80048 BASIC METABOLIC PNL TOTAL CA: CPT

## 2022-04-27 PROCEDURE — 85025 COMPLETE CBC W/AUTO DIFF WBC: CPT

## 2022-04-27 PROCEDURE — 36415 COLL VENOUS BLD VENIPUNCTURE: CPT

## 2022-06-10 ENCOUNTER — OFFICE VISIT (OUTPATIENT)
Dept: INTERNAL MEDICINE CLINIC | Facility: CLINIC | Age: 82
End: 2022-06-10
Payer: MEDICARE

## 2022-06-10 VITALS
BODY MASS INDEX: 29.09 KG/M2 | OXYGEN SATURATION: 97 % | HEART RATE: 93 BPM | HEIGHT: 66 IN | SYSTOLIC BLOOD PRESSURE: 142 MMHG | DIASTOLIC BLOOD PRESSURE: 78 MMHG | TEMPERATURE: 97.5 F | WEIGHT: 181 LBS

## 2022-06-10 DIAGNOSIS — C50.012 MALIGNANT NEOPLASM OF AREOLA OF LEFT BREAST IN FEMALE, UNSPECIFIED ESTROGEN RECEPTOR STATUS (HCC): ICD-10-CM

## 2022-06-10 DIAGNOSIS — M25.521 PAIN IN RIGHT ELBOW: ICD-10-CM

## 2022-06-10 DIAGNOSIS — E78.2 MIXED HYPERLIPIDEMIA: ICD-10-CM

## 2022-06-10 DIAGNOSIS — I10 ESSENTIAL HYPERTENSION: Primary | ICD-10-CM

## 2022-06-10 PROBLEM — N18.31 STAGE 3A CHRONIC KIDNEY DISEASE (HCC): Status: ACTIVE | Noted: 2022-06-10

## 2022-06-10 PROCEDURE — 99214 OFFICE O/P EST MOD 30 MIN: CPT | Performed by: INTERNAL MEDICINE

## 2022-06-10 RX ORDER — AMLODIPINE BESYLATE 5 MG/1
5 TABLET ORAL DAILY
Qty: 30 TABLET | Refills: 1 | Status: SHIPPED | OUTPATIENT
Start: 2022-06-10 | End: 2022-07-15 | Stop reason: SDUPTHER

## 2022-06-10 NOTE — PROGRESS NOTES
Assessment/Plan:             1  Essential hypertension  -     amLODIPine (NORVASC) 5 mg tablet; Take 1 tablet (5 mg total) by mouth daily  -     Basic metabolic panel; Future    2  Malignant neoplasm of areola of left breast in female, unspecified estrogen receptor status ([facility] )    3  Mixed hyperlipidemia    4  Pain in right elbow         Subjective:      Patient ID: Raman Weir is a 80 y o  female  Follow-up on multiple medical problems to ensure the stable on current medication, right elbow pain      The following portions of the patient's history were reviewed and updated as appropriate: She  has a past medical history of Asthma, Breast cancer ([facility] ) (06/13/2019), Esophagitis, GERD (gastroesophageal reflux disease), and Hypertension  She   Patient Active Problem List    Diagnosis Date Noted    Pain in right elbow 06/10/2022    Stage 3a chronic kidney disease ([facility] ) 06/10/2022    SOB (shortness of breath) 01/11/2022    Osteoarthritis of spine with radiculopathy, lumbar region 08/06/2021    Essential hypertension 04/23/2021    Malignant neoplasm of areola of left breast in female [facility]) 04/23/2021    Gastroesophageal reflux disease without esophagitis 04/23/2021    Dry skin 04/23/2021    Seasonal allergies 04/23/2021    Mixed hyperlipidemia 04/23/2021    Essential hypertension, benign 04/23/2021    Medicare annual wellness visit, subsequent 10/27/2020    Body mass index 30 0-30 9, adult 10/27/2020    Low back pain due to bilateral sciatica 10/27/2020    Asthma     Encounter for follow-up examination after completed treatment for malignant neoplasm 05/28/2020    Breast cancer ([place] [address] ) 06/13/2019    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    Hiatal hernia 03/19/2018    Hypertension 03/17/2018    GERD (gastroesophageal reflux disease) 03/17/2018    Esophagitis 03/17/2018     She  has a past surgical history that includes Tonsillectomy;  Dental surgery; Esophagogastroduodenoscopy (N/A, 3/19/2018); US guided breast biopsy left complete (Left, 5/23/2019); Mammo stereotactic breast biopsy left (all inc) (Left, 6/13/2019); Mastectomy w/ sentinel node biopsy (Left, 7/2/2019); Mastectomy (Left, 07/02/2019); and Breast lumpectomy (Left, 1978)  Her family history includes Alzheimer's disease in her mother; Dementia in her mother; Multiple sclerosis in her daughter, sister, and sister; No Known Problems in her father, maternal aunt, maternal aunt, maternal aunt, maternal aunt, maternal aunt, maternal aunt, maternal grandfather, maternal grandmother, paternal grandfather, paternal grandmother, sister, sister, sister, and sister  She  reports that she quit smoking about 9 years ago  She has a 10 00 pack-year smoking history  She has never used smokeless tobacco  She reports that she does not drink alcohol and does not use drugs  Current Outpatient Medications   Medication Sig Dispense Refill    amLODIPine (NORVASC) 5 mg tablet Take 1 tablet (5 mg total) by mouth daily 30 tablet 1    Ascorbic Acid (vitamin C) 1000 MG tablet Take 1,000 mg by mouth daily      Cholecalciferol (VITAMIN D PO) Take 1,000 Units by mouth daily        No current facility-administered medications for this visit  Current Outpatient Medications on File Prior to Visit   Medication Sig    Ascorbic Acid (vitamin C) 1000 MG tablet Take 1,000 mg by mouth daily    Cholecalciferol (VITAMIN D PO) Take 1,000 Units by mouth daily     [DISCONTINUED] losartan (COZAAR) 50 mg tablet Take 1 tablet (50 mg total) by mouth daily    [DISCONTINUED] ezetimibe (ZETIA) 10 mg tablet Take 1 tablet (10 mg total) by mouth daily (Patient not taking: Reported on 6/10/2022)    [DISCONTINUED] famotidine (PEPCID) 40 MG tablet Take 1 tablet (40 mg total) by mouth daily at bedtime as needed for indigestion (Patient not taking: Reported on 6/10/2022)     No current facility-administered medications on file prior to visit  She is allergic to lisinopril and rosuvastatin       Review of Systems   Constitutional: Negative for chills and fever  HENT: Negative for congestion, ear pain and sore throat  Eyes: Negative for pain  Respiratory: Negative for cough and shortness of breath  Cardiovascular: Negative for chest pain and leg swelling  Gastrointestinal: Negative for abdominal pain, nausea and vomiting  Endocrine: Negative for polyuria  Genitourinary: Negative for difficulty urinating, frequency and urgency  Musculoskeletal: Positive for arthralgias  Negative for back pain  Skin: Negative for rash  Neurological: Negative for weakness and headaches  Psychiatric/Behavioral: Negative for sleep disturbance  The patient is not nervous/anxious            Objective:      /78 (BP Location: Right arm, Patient Position: Sitting, Cuff Size: Adult)   Pulse 93   Temp 97 5 °F (36 4 °C) (Temporal)   Ht 5' 6" (1 676 m)   Wt 82 1 kg (181 lb)   SpO2 97%   BMI 29 21 kg/m²     Recent Results (from the past 1344 hour(s))   Basic metabolic panel    Collection Time: 04/27/22  5:56 PM   Result Value Ref Range    Sodium 139 136 - 145 mmol/L    Potassium 3 7 3 5 - 5 3 mmol/L    Chloride 105 100 - 108 mmol/L    CO2 28 21 - 32 mmol/L    ANION GAP 6 4 - 13 mmol/L    BUN 12 5 - 25 mg/dL    Creatinine 0 96 0 60 - 1 30 mg/dL    Glucose 97 65 - 140 mg/dL    Calcium 9 4 8 3 - 10 1 mg/dL    eGFR 56 ml/min/1 73sq m   CBC and differential    Collection Time: 04/27/22  5:56 PM   Result Value Ref Range    WBC 7 84 4 31 - 10 16 Thousand/uL    RBC 4 08 3 81 - 5 12 Million/uL    Hemoglobin 11 6 11 5 - 15 4 g/dL    Hematocrit 37 1 34 8 - 46 1 %    MCV 91 82 - 98 fL    MCH 28 4 26 8 - 34 3 pg    MCHC 31 3 (L) 31 4 - 37 4 g/dL    RDW 13 3 11 6 - 15 1 %    MPV 9 2 8 9 - 12 7 fL    Platelets 721 004 - 179 Thousands/uL    nRBC 0 /100 WBCs    Neutrophils Relative 63 43 - 75 %    Immat GRANS % 0 0 - 2 %    Lymphocytes Relative 31 14 - 44 % Monocytes Relative 4 4 - 12 %    Eosinophils Relative 1 0 - 6 %    Basophils Relative 1 0 - 1 %    Neutrophils Absolute 4 90 1 85 - 7 62 Thousands/µL    Immature Grans Absolute 0 03 0 00 - 0 20 Thousand/uL    Lymphocytes Absolute 2 46 0 60 - 4 47 Thousands/µL    Monocytes Absolute 0 34 0 17 - 1 22 Thousand/µL    Eosinophils Absolute 0 07 0 00 - 0 61 Thousand/µL    Basophils Absolute 0 04 0 00 - 0 10 Thousands/µL        Physical Exam  Constitutional:       Appearance: Normal appearance  HENT:      Head: Normocephalic  Right Ear: Tympanic membrane, ear canal and external ear normal       Left Ear: Tympanic membrane, ear canal and external ear normal       Nose: Nose normal  No congestion  Mouth/Throat:      Mouth: Mucous membranes are moist       Pharynx: Oropharynx is clear  No oropharyngeal exudate or posterior oropharyngeal erythema  Eyes:      Extraocular Movements: Extraocular movements intact  Conjunctiva/sclera: Conjunctivae normal       Pupils: Pupils are equal, round, and reactive to light  Cardiovascular:      Rate and Rhythm: Normal rate and regular rhythm  Heart sounds: Normal heart sounds  No murmur heard  Pulmonary:      Effort: Pulmonary effort is normal       Breath sounds: Normal breath sounds  No wheezing or rales  Abdominal:      General: Bowel sounds are normal  There is no distension  Palpations: Abdomen is soft  Tenderness: There is no abdominal tenderness  Musculoskeletal:         General: Normal range of motion  Cervical back: Normal range of motion and neck supple  Right lower leg: No edema  Left lower leg: No edema  Lymphadenopathy:      Cervical: No cervical adenopathy  Skin:     General: Skin is warm  Neurological:      General: No focal deficit present  Mental Status: She is alert and oriented to person, place, and time

## 2022-07-14 ENCOUNTER — APPOINTMENT (OUTPATIENT)
Dept: LAB | Age: 82
End: 2022-07-14
Payer: MEDICARE

## 2022-07-14 DIAGNOSIS — I10 ESSENTIAL HYPERTENSION: ICD-10-CM

## 2022-07-14 PROCEDURE — 36415 COLL VENOUS BLD VENIPUNCTURE: CPT

## 2022-07-14 PROCEDURE — 80048 BASIC METABOLIC PNL TOTAL CA: CPT

## 2022-07-15 ENCOUNTER — OFFICE VISIT (OUTPATIENT)
Dept: INTERNAL MEDICINE CLINIC | Facility: CLINIC | Age: 82
End: 2022-07-15
Payer: MEDICARE

## 2022-07-15 VITALS
SYSTOLIC BLOOD PRESSURE: 128 MMHG | OXYGEN SATURATION: 97 % | BODY MASS INDEX: 28.28 KG/M2 | WEIGHT: 176 LBS | DIASTOLIC BLOOD PRESSURE: 74 MMHG | HEART RATE: 74 BPM | TEMPERATURE: 97.9 F | HEIGHT: 66 IN

## 2022-07-15 DIAGNOSIS — M47.26 OSTEOARTHRITIS OF SPINE WITH RADICULOPATHY, LUMBAR REGION: ICD-10-CM

## 2022-07-15 DIAGNOSIS — M81.0 SENILE OSTEOPOROSIS: ICD-10-CM

## 2022-07-15 DIAGNOSIS — I10 ESSENTIAL HYPERTENSION: Primary | ICD-10-CM

## 2022-07-15 DIAGNOSIS — E78.2 MIXED HYPERLIPIDEMIA: ICD-10-CM

## 2022-07-15 DIAGNOSIS — C50.012 MALIGNANT NEOPLASM OF AREOLA OF LEFT BREAST IN FEMALE, UNSPECIFIED ESTROGEN RECEPTOR STATUS (HCC): ICD-10-CM

## 2022-07-15 LAB
ANION GAP SERPL CALCULATED.3IONS-SCNC: 5 MMOL/L (ref 4–13)
BUN SERPL-MCNC: 14 MG/DL (ref 5–25)
CALCIUM SERPL-MCNC: 9.7 MG/DL (ref 8.3–10.1)
CHLORIDE SERPL-SCNC: 106 MMOL/L (ref 100–108)
CO2 SERPL-SCNC: 28 MMOL/L (ref 21–32)
CREAT SERPL-MCNC: 0.84 MG/DL (ref 0.6–1.3)
GFR SERPL CREATININE-BSD FRML MDRD: 66 ML/MIN/1.73SQ M
GLUCOSE P FAST SERPL-MCNC: 92 MG/DL (ref 65–99)
POTASSIUM SERPL-SCNC: 3.7 MMOL/L (ref 3.5–5.3)
SODIUM SERPL-SCNC: 139 MMOL/L (ref 136–145)

## 2022-07-15 PROCEDURE — 99214 OFFICE O/P EST MOD 30 MIN: CPT | Performed by: INTERNAL MEDICINE

## 2022-07-15 RX ORDER — AMLODIPINE BESYLATE 5 MG/1
5 TABLET ORAL DAILY
Qty: 90 TABLET | Refills: 1 | Status: SHIPPED | OUTPATIENT
Start: 2022-07-15

## 2022-07-15 NOTE — PROGRESS NOTES
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, unspecified estrogen receptor status ([facility] )    5  Osteoarthritis of spine with radiculopathy, lumbar region         Subjective:      Patient ID: Harmony Robbins is a 80 y o  female  Follow-up on blood test done yesterday, test discussed with her, also blood pressure check, doing well      The following portions of the patient's history were reviewed and updated as appropriate: She  has a past medical history of Asthma, Breast cancer ([facility] ) (06/13/2019), Esophagitis, GERD (gastroesophageal reflux disease), and Hypertension    She   Patient Active Problem List    Diagnosis Date Noted    Senile osteoporosis 07/15/2022    Pain in right elbow 06/10/2022    Stage 3a chronic kidney disease ([facility] ) 06/10/2022    SOB (shortness of breath) 01/11/2022    Osteoarthritis of spine with radiculopathy, lumbar region 08/06/2021    Essential hypertension 04/23/2021    Malignant neoplasm of areola of left breast in female [facility]) 04/23/2021    Gastroesophageal reflux disease without esophagitis 04/23/2021    Dry skin 04/23/2021    Seasonal allergies 04/23/2021    Mixed hyperlipidemia 04/23/2021    Essential hypertension, benign 04/23/2021    Medicare annual wellness visit, subsequent 10/27/2020    Body mass index 30 0-30 9, adult 10/27/2020    Low back pain due to bilateral sciatica 10/27/2020    Asthma     Encounter for follow-up examination after completed treatment for malignant neoplasm 05/28/2020    Breast cancer ([facility] ) 06/13/2019    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    Hiatal hernia 03/19/2018    Hypertension 03/17/2018    GERD (gastroesophageal reflux disease) 03/17/2018    Esophagitis 03/17/2018     She  has a past surgical history that includes Tonsillectomy; Dental surgery; Esophagogastroduodenoscopy (N/A, 3/19/2018); US guided breast biopsy left complete (Left, 5/23/2019); Mammo stereotactic breast biopsy left (all inc) (Left, 6/13/2019); Mastectomy w/ sentinel node biopsy (Left, 7/2/2019); Mastectomy (Left, 07/02/2019); and Breast lumpectomy (Left, 1978)  Her family history includes Alzheimer's disease in her mother; Dementia in her mother; Multiple sclerosis in her daughter, sister, and sister; No Known Problems in her father, maternal aunt, maternal aunt, maternal aunt, maternal aunt, maternal aunt, maternal aunt, maternal grandfather, maternal grandmother, paternal grandfather, paternal grandmother, sister, sister, sister, and sister  She  reports that she quit smoking about 9 years ago  She has a 10 00 pack-year smoking history  She has never used smokeless tobacco  She reports that she does not drink alcohol and does not use drugs  Current Outpatient Medications   Medication Sig Dispense Refill    amLODIPine (NORVASC) 5 mg tablet Take 1 tablet (5 mg total) by mouth daily 90 tablet 1    Ascorbic Acid (vitamin C) 1000 MG tablet Take 1,000 mg by mouth daily      Cholecalciferol (VITAMIN D PO) Take 1,000 Units by mouth daily        No current facility-administered medications for this visit       Current Outpatient Medications on File Prior to Visit   Medication Sig    Ascorbic Acid (vitamin C) 1000 MG tablet Take 1,000 mg by mouth daily    Cholecalciferol (VITAMIN D PO) Take 1,000 Units by mouth daily     [DISCONTINUED] amLODIPine (NORVASC) 5 mg tablet Take 1 tablet (5 mg total) by mouth daily     No current facility-administered medications on file prior to visit  She is allergic to hydrochlorothiazide, lisinopril, losartan, and rosuvastatin       Review of Systems   Constitutional: Negative for chills and fever  HENT: Negative for congestion, ear pain and sore throat  Eyes: Negative for pain  Respiratory: Negative for cough and shortness of breath  Cardiovascular: Negative for chest pain and leg swelling  Gastrointestinal: Negative for abdominal pain, nausea and vomiting  Endocrine: Negative for polyuria  Genitourinary: Negative for difficulty urinating, frequency and urgency  Musculoskeletal: Negative for arthralgias and back pain  Skin: Negative for rash  Neurological: Negative for weakness and headaches  Psychiatric/Behavioral: Negative for sleep disturbance  The patient is not nervous/anxious  Objective:      /74 (BP Location: Right arm, Patient Position: Sitting, Cuff Size: Standard)   Pulse 74   Temp 97 9 °F (36 6 °C) (Temporal)   Ht 5' 6" (1 676 m)   Wt 79 8 kg (176 lb)   SpO2 97%   BMI 28 41 kg/m²     Recent Results (from the past 1344 hour(s))   Basic metabolic panel    Collection Time: 07/14/22  3:18 PM   Result Value Ref Range    Sodium 139 136 - 145 mmol/L    Potassium 3 7 3 5 - 5 3 mmol/L    Chloride 106 100 - 108 mmol/L    CO2 28 21 - 32 mmol/L    ANION GAP 5 4 - 13 mmol/L    BUN 14 5 - 25 mg/dL    Creatinine 0 84 0 60 - 1 30 mg/dL    Glucose, Fasting 92 65 - 99 mg/dL    Calcium 9 7 8 3 - 10 1 mg/dL    eGFR 66 ml/min/1 73sq m        Physical Exam  Constitutional:       Appearance: Normal appearance  HENT:      Head: Normocephalic  Right Ear: Tympanic membrane, ear canal and external ear normal       Left Ear: Tympanic membrane, ear canal and external ear normal       Nose: Nose normal  No congestion  Mouth/Throat:      Mouth: Mucous membranes are moist       Pharynx: Oropharynx is clear  No oropharyngeal exudate or posterior oropharyngeal erythema     Eyes:      Extraocular Movements: Extraocular movements intact  Conjunctiva/sclera: Conjunctivae normal       Pupils: Pupils are equal, round, and reactive to light  Cardiovascular:      Rate and Rhythm: Normal rate and regular rhythm  Heart sounds: Normal heart sounds  No murmur heard  Pulmonary:      Effort: Pulmonary effort is normal       Breath sounds: Normal breath sounds  No wheezing or rales  Abdominal:      General: Bowel sounds are normal  There is no distension  Palpations: Abdomen is soft  Tenderness: There is no abdominal tenderness  Musculoskeletal:         General: Normal range of motion  Cervical back: Normal range of motion and neck supple  Right lower leg: Edema present  Left lower leg: Edema present  Lymphadenopathy:      Cervical: No cervical adenopathy  Skin:     General: Skin is warm  Neurological:      General: No focal deficit present  Mental Status: She is alert and oriented to person, place, and time

## 2022-07-28 ENCOUNTER — HOSPITAL ENCOUNTER (OUTPATIENT)
Dept: MAMMOGRAPHY | Facility: CLINIC | Age: 82
Discharge: HOME | End: 2022-07-28
Payer: MEDICARE

## 2022-07-28 DIAGNOSIS — Z12.31 ENCOUNTER FOR SCREENING MAMMOGRAM FOR MALIGNANT NEOPLASM OF BREAST: ICD-10-CM

## 2022-07-28 DIAGNOSIS — Z86.000 HISTORY OF DUCTAL CARCINOMA IN SITU (DCIS) OF BREAST: ICD-10-CM

## 2022-07-28 PROCEDURE — 77065 DX MAMMO INCL CAD UNI: CPT

## 2022-07-28 PROCEDURE — G0279 TOMOSYNTHESIS, MAMMO: HCPCS

## 2022-10-12 PROBLEM — Z00.00 MEDICARE ANNUAL WELLNESS VISIT, SUBSEQUENT: Status: RESOLVED | Noted: 2020-10-27 | Resolved: 2022-10-12

## 2022-11-11 ENCOUNTER — RA CDI HCC (OUTPATIENT)
Dept: OTHER | Facility: HOSPITAL | Age: 82
End: 2022-11-11

## 2022-11-11 NOTE — PROGRESS NOTES
[facility]  coding opportunities       Chart reviewed, no opportunity found: CHART REVIEWED, NO OPPORTUNITY FOUND        Patients Insurance     Medicare Insurance: Medicare

## 2022-11-17 ENCOUNTER — APPOINTMENT (OUTPATIENT)
Dept: LAB | Age: 82
End: 2022-11-17

## 2022-11-17 DIAGNOSIS — E78.2 MIXED HYPERLIPIDEMIA: ICD-10-CM

## 2022-11-17 DIAGNOSIS — I10 ESSENTIAL HYPERTENSION: ICD-10-CM

## 2022-11-17 LAB
ALBUMIN SERPL BCP-MCNC: 3.3 G/DL (ref 3.5–5)
ALP SERPL-CCNC: 122 U/L (ref 46–116)
ALT SERPL W P-5'-P-CCNC: 19 U/L (ref 12–78)
ANION GAP SERPL CALCULATED.3IONS-SCNC: 3 MMOL/L (ref 4–13)
AST SERPL W P-5'-P-CCNC: 15 U/L (ref 5–45)
BASOPHILS # BLD AUTO: 0.06 THOUSANDS/ÂΜL (ref 0–0.1)
BASOPHILS NFR BLD AUTO: 1 % (ref 0–1)
BILIRUB SERPL-MCNC: 0.75 MG/DL (ref 0.2–1)
BUN SERPL-MCNC: 16 MG/DL (ref 5–25)
CALCIUM ALBUM COR SERPL-MCNC: 10 MG/DL (ref 8.3–10.1)
CALCIUM SERPL-MCNC: 9.4 MG/DL (ref 8.3–10.1)
CHLORIDE SERPL-SCNC: 107 MMOL/L (ref 96–108)
CHOLEST SERPL-MCNC: 247 MG/DL
CO2 SERPL-SCNC: 29 MMOL/L (ref 21–32)
CREAT SERPL-MCNC: 0.91 MG/DL (ref 0.6–1.3)
EOSINOPHIL # BLD AUTO: 0.19 THOUSAND/ÂΜL (ref 0–0.61)
EOSINOPHIL NFR BLD AUTO: 3 % (ref 0–6)
ERYTHROCYTE [DISTWIDTH] IN BLOOD BY AUTOMATED COUNT: 14.6 % (ref 11.6–15.1)
GFR SERPL CREATININE-BSD FRML MDRD: 59 ML/MIN/1.73SQ M
GLUCOSE P FAST SERPL-MCNC: 98 MG/DL (ref 65–99)
HCT VFR BLD AUTO: 36.9 % (ref 34.8–46.1)
HDLC SERPL-MCNC: 52 MG/DL
HGB BLD-MCNC: 11.6 G/DL (ref 11.5–15.4)
IMM GRANULOCYTES # BLD AUTO: 0.02 THOUSAND/UL (ref 0–0.2)
IMM GRANULOCYTES NFR BLD AUTO: 0 % (ref 0–2)
LDLC SERPL CALC-MCNC: 171 MG/DL (ref 0–100)
LYMPHOCYTES # BLD AUTO: 2.67 THOUSANDS/ÂΜL (ref 0.6–4.47)
LYMPHOCYTES NFR BLD AUTO: 42 % (ref 14–44)
MCH RBC QN AUTO: 28.9 PG (ref 26.8–34.3)
MCHC RBC AUTO-ENTMCNC: 31.4 G/DL (ref 31.4–37.4)
MCV RBC AUTO: 92 FL (ref 82–98)
MONOCYTES # BLD AUTO: 0.43 THOUSAND/ÂΜL (ref 0.17–1.22)
MONOCYTES NFR BLD AUTO: 7 % (ref 4–12)
NEUTROPHILS # BLD AUTO: 2.93 THOUSANDS/ÂΜL (ref 1.85–7.62)
NEUTS SEG NFR BLD AUTO: 47 % (ref 43–75)
NRBC BLD AUTO-RTO: 0 /100 WBCS
PLATELET # BLD AUTO: 325 THOUSANDS/UL (ref 149–390)
PMV BLD AUTO: 9.9 FL (ref 8.9–12.7)
POTASSIUM SERPL-SCNC: 4 MMOL/L (ref 3.5–5.3)
PROT SERPL-MCNC: 7.7 G/DL (ref 6.4–8.4)
RBC # BLD AUTO: 4.01 MILLION/UL (ref 3.81–5.12)
SODIUM SERPL-SCNC: 139 MMOL/L (ref 135–147)
TRIGL SERPL-MCNC: 119 MG/DL
TSH SERPL DL<=0.05 MIU/L-ACNC: 0.85 UIU/ML (ref 0.45–4.5)
WBC # BLD AUTO: 6.3 THOUSAND/UL (ref 4.31–10.16)

## 2022-11-18 ENCOUNTER — OFFICE VISIT (OUTPATIENT)
Dept: INTERNAL MEDICINE CLINIC | Facility: CLINIC | Age: 82
End: 2022-11-18

## 2022-11-18 VITALS — BODY MASS INDEX: 28.45 KG/M2 | WEIGHT: 177 LBS | HEIGHT: 66 IN

## 2022-11-18 DIAGNOSIS — C50.012 MALIGNANT NEOPLASM OF AREOLA OF LEFT BREAST IN FEMALE, UNSPECIFIED ESTROGEN RECEPTOR STATUS (HCC): ICD-10-CM

## 2022-11-18 DIAGNOSIS — E78.2 MIXED HYPERLIPIDEMIA: ICD-10-CM

## 2022-11-18 DIAGNOSIS — I10 ESSENTIAL HYPERTENSION: Primary | ICD-10-CM

## 2022-11-18 DIAGNOSIS — Z00.00 MEDICARE ANNUAL WELLNESS VISIT, SUBSEQUENT: ICD-10-CM

## 2022-11-18 NOTE — PROGRESS NOTES
Assessment and Plan:     Problem List Items Addressed This Visit        Cardiovascular and Mediastinum    Essential hypertension - Primary       Other    Malignant neoplasm of areola of left breast in female [facility])    Mixed hyperlipidemia   Other Visit Diagnoses     Medicare annual wellness visit, subsequent              Depression Screening and Follow-up Plan: Patient was screened for depression during today's encounter  They screened negative with a PHQ-2 score of 0  Urinary Incontinence Plan of Care: counseling topics discussed: use restroom every 2 hours and limit alcohol, caffeine, spicy foods, and acidic foods  Preventive health issues were discussed with patient, and age appropriate screening tests were ordered as noted in patient's After Visit Summary  Personalized health advice and appropriate referrals for health education or preventive services given if needed, as noted in patient's After Visit Summary  History of Present Illness:     Patient presents for a Medicare Wellness Visit    Follow-up on blood test done yesterday test discussed with her, also medical wellness exam     Patient Care Team:  Brandon Betancourt MD as PCP - General (Internal Medicine)  Mary Parra MD as Surgeon (Surgical Oncology)  Stephy Salamanca RN as Nurse Navigator (Oncology)     Review of Systems:     Review of Systems   Constitutional: Negative for chills and fever  HENT: Negative for congestion, ear pain and sore throat  Eyes: Negative for pain  Respiratory: Negative for cough and shortness of breath  Cardiovascular: Negative for chest pain and leg swelling  Gastrointestinal: Negative for abdominal pain, nausea and vomiting  Endocrine: Negative for polyuria  Genitourinary: Negative for difficulty urinating, frequency and urgency  Musculoskeletal: Positive for arthralgias and back pain  Skin: Negative for rash  Neurological: Negative for weakness and headaches     Psychiatric/Behavioral: Negative for sleep disturbance  The patient is not nervous/anxious  Problem List:     Patient Active Problem List   Diagnosis   • Hypertension   • GERD (gastroesophageal reflux disease)   • Esophagitis   • Hiatal hernia   • History of ductal carcinoma in situ (DCIS) of breast   • Encounter for follow-up examination after completed treatment for malignant neoplasm   • Breast cancer ([facility] )   • Asthma   • Body mass index 30 0-30 9, adult   • Low back pain due to bilateral sciatica   • Essential hypertension   • Malignant neoplasm of areola of left breast in female [facility])   • Gastroesophageal reflux disease without esophagitis   • Dry skin   • Seasonal allergies   • Mixed hyperlipidemia   • Essential hypertension, benign   • Osteoarthritis of spine with radiculopathy, lumbar region   • SOB (shortness of breath)   • Pain in right elbow   • Stage 3a chronic kidney disease (HCC)   • Senile osteoporosis      Past Medical and Surgical History:     Past Medical History:   Diagnosis Date   • Asthma     as a child   • Breast cancer ([facility] ) 06/13/2019    left   • Esophagitis    • GERD (gastroesophageal reflux disease)    • Hypertension      Past Surgical History:   Procedure Laterality Date   • BREAST LUMPECTOMY Left 1978    benign   • DENTAL SURGERY     • ESOPHAGOGASTRODUODENOSCOPY N/A 3/19/2018    Procedure: ESOPHAGOGASTRODUODENOSCOPY (EGD); Surgeon: Kel Bryant MD;  Location: [facility]; Service: Gastroenterology   • MAMMO STEREOTACTIC BREAST BIOPSY LEFT (ALL INC) Left 6/13/2019   • MASTECTOMY Left 07/02/2019   • MASTECTOMY W/ SENTINEL NODE BIOPSY Left 7/2/2019    Procedure: BREAST MASTECTOMY WITH SENTINEL LYMPH NODE BIOPSY, LYMPHATIC MAPPING WITH BLUE DYE AND RADIOACTIVE DYE (INJECT AT 0900 BY DR HARDING IN THE OR);   Surgeon: Mary Parra MD;  Location: [facility];  Service: Surgical Oncology   • TONSILLECTOMY     • US GUIDED BREAST BIOPSY LEFT COMPLETE Left 5/23/2019      Family History:     Family History   Problem Relation Age of Onset   • Alzheimer's disease Mother    • Dementia Mother    • No Known Problems Father    • No Known Problems Sister    • Multiple sclerosis Daughter    • No Known Problems Sister    • Multiple sclerosis Sister    • Multiple sclerosis Sister    • No Known Problems Sister    • No Known Problems Sister    • No Known Problems Maternal Aunt    • No Known Problems Maternal Aunt    • No Known Problems Maternal Aunt    • No Known Problems Maternal Aunt    • No Known Problems Maternal Aunt    • No Known Problems Maternal Aunt    • No Known Problems Maternal Grandmother    • No Known Problems Maternal Grandfather    • No Known Problems Paternal Grandmother    • No Known Problems Paternal Grandfather       Social History:     Social History     Socioeconomic History   • Marital status: /Civil Union     Spouse name: None   • Number of children: None   • Years of education: None   • Highest education level: None   Occupational History   • None   Tobacco Use   • Smoking status: Former     Packs/day: 0 50     Years: 20 00     Pack years: 10 00     Types: Cigarettes     Quit date:      Years since quittin 8   • Smokeless tobacco: Never   Vaping Use   • Vaping Use: Never used   Substance and Sexual Activity   • Alcohol use: No   • Drug use: No   • Sexual activity: Not Currently   Other Topics Concern   • None   Social History Narrative   • None     Social Determinants of Health     Financial Resource Strain: Low Risk    • Difficulty of Paying Living Expenses: Not hard at all   Food Insecurity: Not on file   Transportation Needs: No Transportation Needs   • Lack of Transportation (Medical): No   • Lack of Transportation (Non-Medical):  No   Physical Activity: Not on file   Stress: Not on file   Social Connections: Not on file   Intimate Partner Violence: Not on file   Housing Stability: Not on file      Medications and Allergies:     Current Outpatient Medications   Medication Sig Dispense Refill   • amLODIPine (NORVASC) 5 mg tablet Take 1 tablet (5 mg total) by mouth daily 90 tablet 1   • Ascorbic Acid (vitamin C) 1000 MG tablet Take 1,000 mg by mouth daily     • Cholecalciferol (VITAMIN D PO) Take 1,000 Units by mouth daily        No current facility-administered medications for this visit  Allergies   Allergen Reactions   • Hydrochlorothiazide Other (See Comments)     Kidney number goes up   • Lisinopril    • Losartan Other (See Comments)     Kidney number goes up   • Rosuvastatin Nausea Only      Immunizations:     Immunization History   Administered Date(s) Administered   • COVID-19 PFIZER VACCINE 0 3 ML IM 02/26/2021, 03/17/2021, 11/06/2021      Health Maintenance: There are no preventive care reminders to display for this patient  Topic Date Due   • Hepatitis B Vaccine (1 of 3 - 3-dose series) Never done   • Pneumococcal Vaccine: 65+ Years (1 - PCV) Never done   • COVID-19 Vaccine (4 - Booster for Pfizer series) 03/06/2022   • Influenza Vaccine (1) Never done      Medicare Screening Tests and Risk Assessments:     Biju Che is here for her Subsequent Wellness visit  Last Medicare Wellness visit information reviewed, patient interviewed and updates made to the record today  Health Risk Assessment:   Patient rates overall health as fair  Patient feels that their physical health rating is same  Patient is satisfied with their life  Eyesight was rated as same  Hearing was rated as same  Patient feels that their emotional and mental health rating is same  Patients states they are never, rarely angry  Patient states they are sometimes unusually tired/fatigued  Pain experienced in the last 7 days has been some  Patient's pain rating has been 5/10  Patient states that she has experienced no weight loss or gain in last 6 months  Depression Screening:   PHQ-2 Score: 0      Fall Risk Screening:    In the past year, patient has experienced: no history of falling in past year      Urinary Incontinence Screening:   Patient has leaked urine accidently in the last six months  from coughing or sneezing    Home Safety:  Patient does not have trouble with stairs inside or outside of their home  Patient has working smoke alarms and has working carbon monoxide detector  Home safety hazards include: none  Nutrition:   Current diet is No Added Salt and Limited junk food  Medications:   Patient is currently taking over-the-counter supplements  OTC medications include: vitamin D, vitamin C  Patient is able to manage medications  Activities of Daily Living (ADLs)/Instrumental Activities of Daily Living (IADLs):   Walk and transfer into and out of bed and chair?: Yes  Dress and groom yourself?: Yes    Bathe or shower yourself?: Yes    Feed yourself? Yes  Do your laundry/housekeeping?: Yes  Manage your money, pay your bills and track your expenses?: Yes  Make your own meals?: Yes    Do your own shopping?: Yes    Previous Hospitalizations:   Any hospitalizations or ED visits within the last 12 months?: No      Advance Care Planning:   Living will: No    Durable POA for healthcare: No    Advanced directive: No    Five wishes given: Yes      PREVENTIVE SCREENINGS      Cardiovascular Screening:    General: Screening Not Indicated and History Lipid Disorder      Diabetes Screening:     General: Screening Current      Breast Cancer Screening:     General: History Breast Cancer      Cervical Cancer Screening:    General: Screening Not Indicated      Osteoporosis Screening:    General: Screening Not Indicated and History Osteoporosis      Lung Cancer Screening:     General: Screening Not Indicated    Screening, Brief Intervention, and Referral to Treatment (SBIRT)    Screening  Typical number of drinks in a day: 0  Typical number of drinks in a week: 0  Interpretation: Low risk drinking behavior      AUDIT-C Screenin) How often did you have a drink containing alcohol in the past year? never  2) How many drinks did you have on a typical day when you were drinking in the past year? 0  3) How often did you have 6 or more drinks on one occasion in the past year? never    AUDIT-C Score: 0  Interpretation: Score 0-2 (female): Negative screen for alcohol misuse    Single Item Drug Screening:  How often have you used an illegal drug (including marijuana) or a prescription medication for non-medical reasons in the past year? never    Single Item Drug Screen Score: 0  Interpretation: Negative screen for possible drug use disorder    No results found  Physical Exam:     Ht 5' 6" (1 676 m)   Wt 80 3 kg (177 lb)   BMI 28 57 kg/m²     Physical Exam  Vitals and nursing note reviewed  Constitutional:       General: She is not in acute distress  Appearance: She is well-developed  HENT:      Head: Normocephalic and atraumatic  Right Ear: Tympanic membrane, ear canal and external ear normal       Left Ear: Tympanic membrane, ear canal and external ear normal       Mouth/Throat:      Pharynx: Oropharynx is clear  Eyes:      Extraocular Movements: Extraocular movements intact  Conjunctiva/sclera: Conjunctivae normal    Cardiovascular:      Rate and Rhythm: Normal rate and regular rhythm  Heart sounds: Normal heart sounds  No murmur heard  Pulmonary:      Effort: Pulmonary effort is normal  No respiratory distress  Breath sounds: Normal breath sounds  Abdominal:      General: Abdomen is flat  Palpations: Abdomen is soft  Tenderness: There is no abdominal tenderness  Musculoskeletal:         General: No swelling  Normal range of motion  Cervical back: Normal range of motion and neck supple  Skin:     General: Skin is warm and dry  Capillary Refill: Capillary refill takes less than 2 seconds  Neurological:      General: No focal deficit present  Mental Status: She is alert and oriented to person, place, and time     Psychiatric:         Mood and Affect: Mood normal           Hiralal Broderick Carrillo MD

## 2023-03-28 ENCOUNTER — TELEPHONE (OUTPATIENT)
Dept: INTERNAL MEDICINE CLINIC | Facility: CLINIC | Age: 83
End: 2023-03-28

## 2023-03-28 DIAGNOSIS — I10 ESSENTIAL HYPERTENSION: ICD-10-CM

## 2023-03-28 DIAGNOSIS — Z12.31 ENCOUNTER FOR SCREENING MAMMOGRAM FOR MALIGNANT NEOPLASM OF BREAST: Primary | ICD-10-CM

## 2023-03-28 DIAGNOSIS — R73.01 IMPAIRED FASTING BLOOD SUGAR: ICD-10-CM

## 2023-03-28 DIAGNOSIS — E78.2 MIXED HYPERLIPIDEMIA: ICD-10-CM

## 2023-03-28 NOTE — TELEPHONE ENCOUNTER
I need an order for blood labs and a mammogram consultation for my yearly mammogram       Mammogram ordered

## 2023-05-12 ENCOUNTER — RA CDI HCC (OUTPATIENT)
Dept: OTHER | Facility: HOSPITAL | Age: 83
End: 2023-05-12

## 2023-05-17 ENCOUNTER — APPOINTMENT (OUTPATIENT)
Dept: LAB | Age: 83
End: 2023-05-17

## 2023-05-17 DIAGNOSIS — E78.2 MIXED HYPERLIPIDEMIA: ICD-10-CM

## 2023-05-17 DIAGNOSIS — R73.01 IMPAIRED FASTING BLOOD SUGAR: ICD-10-CM

## 2023-05-17 DIAGNOSIS — I10 ESSENTIAL HYPERTENSION: ICD-10-CM

## 2023-05-17 LAB
ALBUMIN SERPL BCP-MCNC: 3.4 G/DL (ref 3.5–5)
ALP SERPL-CCNC: 110 U/L (ref 46–116)
ALT SERPL W P-5'-P-CCNC: 21 U/L (ref 12–78)
ANION GAP SERPL CALCULATED.3IONS-SCNC: -1 MMOL/L (ref 4–13)
AST SERPL W P-5'-P-CCNC: 20 U/L (ref 5–45)
BASOPHILS # BLD AUTO: 0.03 THOUSANDS/ÂΜL (ref 0–0.1)
BASOPHILS NFR BLD AUTO: 1 % (ref 0–1)
BILIRUB SERPL-MCNC: 1.12 MG/DL (ref 0.2–1)
BUN SERPL-MCNC: 16 MG/DL (ref 5–25)
CALCIUM ALBUM COR SERPL-MCNC: 9.6 MG/DL (ref 8.3–10.1)
CALCIUM SERPL-MCNC: 9.1 MG/DL (ref 8.3–10.1)
CHLORIDE SERPL-SCNC: 106 MMOL/L (ref 96–108)
CHOLEST SERPL-MCNC: 254 MG/DL
CO2 SERPL-SCNC: 29 MMOL/L (ref 21–32)
CREAT SERPL-MCNC: 0.88 MG/DL (ref 0.6–1.3)
EOSINOPHIL # BLD AUTO: 0.18 THOUSAND/ÂΜL (ref 0–0.61)
EOSINOPHIL NFR BLD AUTO: 3 % (ref 0–6)
ERYTHROCYTE [DISTWIDTH] IN BLOOD BY AUTOMATED COUNT: 14.3 % (ref 11.6–15.1)
EST. AVERAGE GLUCOSE BLD GHB EST-MCNC: 97 MG/DL
GFR SERPL CREATININE-BSD FRML MDRD: 62 ML/MIN/1.73SQ M
GLUCOSE P FAST SERPL-MCNC: 104 MG/DL (ref 65–99)
HBA1C MFR BLD: 5 %
HCT VFR BLD AUTO: 36.6 % (ref 34.8–46.1)
HDLC SERPL-MCNC: 58 MG/DL
HGB BLD-MCNC: 11.7 G/DL (ref 11.5–15.4)
IMM GRANULOCYTES # BLD AUTO: 0.01 THOUSAND/UL (ref 0–0.2)
IMM GRANULOCYTES NFR BLD AUTO: 0 % (ref 0–2)
LDLC SERPL CALC-MCNC: 180 MG/DL (ref 0–100)
LYMPHOCYTES # BLD AUTO: 1.92 THOUSANDS/ÂΜL (ref 0.6–4.47)
LYMPHOCYTES NFR BLD AUTO: 34 % (ref 14–44)
MCH RBC QN AUTO: 28.8 PG (ref 26.8–34.3)
MCHC RBC AUTO-ENTMCNC: 32 G/DL (ref 31.4–37.4)
MCV RBC AUTO: 90 FL (ref 82–98)
MONOCYTES # BLD AUTO: 0.41 THOUSAND/ÂΜL (ref 0.17–1.22)
MONOCYTES NFR BLD AUTO: 7 % (ref 4–12)
NEUTROPHILS # BLD AUTO: 3.17 THOUSANDS/ÂΜL (ref 1.85–7.62)
NEUTS SEG NFR BLD AUTO: 55 % (ref 43–75)
NRBC BLD AUTO-RTO: 0 /100 WBCS
PLATELET # BLD AUTO: 307 THOUSANDS/UL (ref 149–390)
PMV BLD AUTO: 9.7 FL (ref 8.9–12.7)
POTASSIUM SERPL-SCNC: 4 MMOL/L (ref 3.5–5.3)
PROT SERPL-MCNC: 7.6 G/DL (ref 6.4–8.4)
RBC # BLD AUTO: 4.06 MILLION/UL (ref 3.81–5.12)
SODIUM SERPL-SCNC: 134 MMOL/L (ref 135–147)
TRIGL SERPL-MCNC: 81 MG/DL
TSH SERPL DL<=0.05 MIU/L-ACNC: 0.46 UIU/ML (ref 0.45–4.5)
WBC # BLD AUTO: 5.72 THOUSAND/UL (ref 4.31–10.16)

## 2023-05-24 ENCOUNTER — OFFICE VISIT (OUTPATIENT)
Dept: INTERNAL MEDICINE CLINIC | Facility: CLINIC | Age: 83
End: 2023-05-24

## 2023-05-24 VITALS
HEIGHT: 66 IN | HEART RATE: 72 BPM | TEMPERATURE: 97.7 F | SYSTOLIC BLOOD PRESSURE: 148 MMHG | DIASTOLIC BLOOD PRESSURE: 80 MMHG | BODY MASS INDEX: 28.61 KG/M2 | WEIGHT: 178 LBS | OXYGEN SATURATION: 97 %

## 2023-05-24 DIAGNOSIS — E78.2 MIXED HYPERLIPIDEMIA: ICD-10-CM

## 2023-05-24 DIAGNOSIS — M17.11 PRIMARY OSTEOARTHRITIS OF RIGHT KNEE: ICD-10-CM

## 2023-05-24 DIAGNOSIS — I10 ESSENTIAL HYPERTENSION: Primary | ICD-10-CM

## 2023-05-24 DIAGNOSIS — C50.012 MALIGNANT NEOPLASM OF AREOLA OF LEFT BREAST IN FEMALE, UNSPECIFIED ESTROGEN RECEPTOR STATUS (HCC): ICD-10-CM

## 2023-05-24 DIAGNOSIS — N18.31 STAGE 3A CHRONIC KIDNEY DISEASE (HCC): ICD-10-CM

## 2023-05-24 RX ORDER — AMLODIPINE BESYLATE 5 MG/1
5 TABLET ORAL DAILY
Qty: 90 TABLET | Refills: 2 | Status: SHIPPED | OUTPATIENT
Start: 2023-05-24

## 2023-05-24 NOTE — PROGRESS NOTES
Assessment/Plan:    BMI Counseling: Body mass index is 28 73 kg/m²  The BMI is above normal  Nutrition recommendations include decreasing portion sizes, encouraging healthy choices of fruits and vegetables and decreasing fast food intake  Exercise recommendations include moderate physical activity 150 minutes/week  Rationale for BMI follow-up plan is due to patient being overweight or obese  Depression Screening and Follow-up Plan: Patient was screened for depression during today's encounter  They screened negative with a PHQ-2 score of 0             1  Essential hypertension  -     amLODIPine (NORVASC) 5 mg tablet; Take 1 tablet (5 mg total) by mouth daily    2  Mixed hyperlipidemia    3  Malignant neoplasm of areola of left breast in female, unspecified estrogen receptor status ([facility] )    4  Primary osteoarthritis of right knee    5  Stage 3a chronic kidney disease (HCC)           Subjective:      Patient ID: Alberta Moser is a [de-identified] y o  female  Follow-up on blood test done on 5/7/2023 test discussed with her      The following portions of the patient's history were reviewed and updated as appropriate: She  has a past medical history of Asthma, Breast cancer ([facility] ) (06/13/2019), Esophagitis, GERD (gastroesophageal reflux disease), and Hypertension    She   Patient Active Problem List    Diagnosis Date Noted   • Primary osteoarthritis of right knee 05/24/2023   • Senile osteoporosis 07/15/2022   • Pain in right elbow 06/10/2022   • Stage 3a chronic kidney disease ([facility] ) 06/10/2022   • SOB (shortness of breath) 01/11/2022   • Osteoarthritis of spine with radiculopathy, lumbar region 08/06/2021   • Essential hypertension 04/23/2021   • Malignant neoplasm of areola of left breast in female [facility]) 04/23/2021   • Gastroesophageal reflux disease without esophagitis 04/23/2021   • Dry skin 04/23/2021   • Seasonal allergies 04/23/2021   • Mixed hyperlipidemia 04/23/2021   • Essential hypertension, benign 04/23/2021   • Body mass index 30 0-30 9, adult 10/27/2020   • Low back pain due to bilateral sciatica 10/27/2020   • Asthma    • Encounter for follow-up examination after completed treatment for malignant neoplasm 05/28/2020   • Breast cancer ([facility] ) 06/13/2019   • 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   • Hiatal hernia 03/19/2018   • Hypertension 03/17/2018   • GERD (gastroesophageal reflux disease) 03/17/2018   • Esophagitis 03/17/2018     She  has a past surgical history that includes Tonsillectomy; Dental surgery; Esophagogastroduodenoscopy (N/A, 3/19/2018); US guided breast biopsy left complete (Left, 5/23/2019); Mammo stereotactic breast biopsy left (all inc) (Left, 6/13/2019); Mastectomy w/ sentinel node biopsy (Left, 7/2/2019); Mastectomy (Left, 07/02/2019); and Breast lumpectomy (Left, 1978)  Her family history includes Alzheimer's disease in her mother; Dementia in her mother; Multiple sclerosis in her daughter, sister, and sister; No Known Problems in her father, maternal aunt, maternal aunt, maternal aunt, maternal aunt, maternal aunt, maternal aunt, maternal grandfather, maternal grandmother, paternal grandfather, paternal grandmother, sister, sister, sister, and sister  She  reports that she quit smoking about 10 years ago  Her smoking use included cigarettes  She has a 10 00 pack-year smoking history  She has never used smokeless tobacco  She reports that she does not drink alcohol and does not use drugs  Current Outpatient Medications   Medication Sig Dispense Refill   • amLODIPine (NORVASC) 5 mg tablet Take 1 tablet (5 mg total) by mouth daily 90 tablet 2   • Ascorbic Acid (vitamin C) 1000 MG tablet Take 1,000 mg by mouth daily     • Cholecalciferol (VITAMIN D PO) Take 1,000 Units by mouth daily        No current facility-administered medications for this visit       Current Outpatient Medications on File Prior to Visit   Medication Sig   • Ascorbic Acid (vitamin C) 1000 MG tablet Take "1,000 mg by mouth daily   • Cholecalciferol (VITAMIN D PO) Take 1,000 Units by mouth daily    • [DISCONTINUED] amLODIPine (NORVASC) 5 mg tablet Take 1 tablet (5 mg total) by mouth daily     No current facility-administered medications on file prior to visit  She is allergic to hydrochlorothiazide, lisinopril, losartan, and rosuvastatin       Review of Systems   Constitutional: Negative for chills and fever  HENT: Negative for congestion, ear pain and sore throat  Eyes: Negative for pain  Respiratory: Positive for cough  Negative for shortness of breath  Cardiovascular: Negative for chest pain and leg swelling  Gastrointestinal: Negative for abdominal pain, nausea and vomiting  Endocrine: Negative for polyuria  Genitourinary: Negative for difficulty urinating, frequency and urgency  Musculoskeletal: Positive for arthralgias  Negative for back pain  Skin: Negative for rash  Neurological: Negative for weakness and headaches  Psychiatric/Behavioral: Negative for sleep disturbance  The patient is not nervous/anxious            Objective:      /80 (BP Location: Right arm, Patient Position: Sitting, Cuff Size: Large)   Pulse 72   Temp 97 7 °F (36 5 °C) (Temporal)   Ht 5' 6\" (1 676 m)   Wt 80 7 kg (178 lb)   SpO2 97%   BMI 28 73 kg/m²     Recent Results (from the past 1344 hour(s))   CBC and differential    Collection Time: 05/17/23  6:48 PM   Result Value Ref Range    WBC 5 72 4 31 - 10 16 Thousand/uL    RBC 4 06 3 81 - 5 12 Million/uL    Hemoglobin 11 7 11 5 - 15 4 g/dL    Hematocrit 36 6 34 8 - 46 1 %    MCV 90 82 - 98 fL    MCH 28 8 26 8 - 34 3 pg    MCHC 32 0 31 4 - 37 4 g/dL    RDW 14 3 11 6 - 15 1 %    MPV 9 7 8 9 - 12 7 fL    Platelets 627 979 - 518 Thousands/uL    nRBC 0 /100 WBCs    Neutrophils Relative 55 43 - 75 %    Immat GRANS % 0 0 - 2 %    Lymphocytes Relative 34 14 - 44 %    Monocytes Relative 7 4 - 12 %    Eosinophils Relative 3 0 - 6 %    Basophils Relative 1 0 - 1 % " Neutrophils Absolute 3 17 1 85 - 7 62 Thousands/µL    Immature Grans Absolute 0 01 0 00 - 0 20 Thousand/uL    Lymphocytes Absolute 1 92 0 60 - 4 47 Thousands/µL    Monocytes Absolute 0 41 0 17 - 1 22 Thousand/µL    Eosinophils Absolute 0 18 0 00 - 0 61 Thousand/µL    Basophils Absolute 0 03 0 00 - 0 10 Thousands/µL   Comprehensive metabolic panel    Collection Time: 05/17/23  6:48 PM   Result Value Ref Range    Sodium 134 (L) 135 - 147 mmol/L    Potassium 4 0 3 5 - 5 3 mmol/L    Chloride 106 96 - 108 mmol/L    CO2 29 21 - 32 mmol/L    ANION GAP -1 (L) 4 - 13 mmol/L    BUN 16 5 - 25 mg/dL    Creatinine 0 88 0 60 - 1 30 mg/dL    Glucose, Fasting 104 (H) 65 - 99 mg/dL    Calcium 9 1 8 3 - 10 1 mg/dL    Corrected Calcium 9 6 8 3 - 10 1 mg/dL    AST 20 5 - 45 U/L    ALT 21 12 - 78 U/L    Alkaline Phosphatase 110 46 - 116 U/L    Total Protein 7 6 6 4 - 8 4 g/dL    Albumin 3 4 (L) 3 5 - 5 0 g/dL    Total Bilirubin 1 12 (H) 0 20 - 1 00 mg/dL    eGFR 62 ml/min/1 73sq m   Lipid Panel with Direct LDL reflex    Collection Time: 05/17/23  6:48 PM   Result Value Ref Range    Cholesterol 254 (H) See Comment mg/dL    Triglycerides 81 See Comment mg/dL    HDL, Direct 58 >=50 mg/dL    LDL Calculated 180 (H) 0 - 100 mg/dL   TSH, 3rd generation    Collection Time: 05/17/23  6:48 PM   Result Value Ref Range    TSH 3RD GENERATON 0 461 0 450 - 4 500 uIU/mL   Hemoglobin A1C    Collection Time: 05/17/23  6:48 PM   Result Value Ref Range    Hemoglobin A1C 5 0 Normal 3 8-5 6%; PreDiabetic 5 7-6 4%; Diabetic >=6 5%; Glycemic control for adults with diabetes <7 0% %    EAG 97 mg/dl        Physical Exam  Constitutional:       Appearance: Normal appearance  HENT:      Head: Normocephalic  Right Ear: Tympanic membrane, ear canal and external ear normal       Left Ear: Tympanic membrane, ear canal and external ear normal       Nose: Nose normal  No congestion        Mouth/Throat:      Mouth: Mucous membranes are moist       Pharynx: Oropharynx is clear  No oropharyngeal exudate or posterior oropharyngeal erythema  Eyes:      Extraocular Movements: Extraocular movements intact  Conjunctiva/sclera: Conjunctivae normal       Pupils: Pupils are equal, round, and reactive to light  Cardiovascular:      Rate and Rhythm: Normal rate and regular rhythm  Heart sounds: Normal heart sounds  No murmur heard  Pulmonary:      Effort: Pulmonary effort is normal       Breath sounds: Normal breath sounds  No wheezing or rales  Abdominal:      General: Bowel sounds are normal  There is no distension  Palpations: Abdomen is soft  Tenderness: There is no abdominal tenderness  Musculoskeletal:         General: Normal range of motion  Cervical back: Normal range of motion and neck supple  Right lower leg: No edema  Left lower leg: No edema  Lymphadenopathy:      Cervical: No cervical adenopathy  Skin:     General: Skin is warm  Neurological:      General: No focal deficit present  Mental Status: She is alert and oriented to person, place, and time

## 2023-11-15 ENCOUNTER — RA CDI HCC (OUTPATIENT)
Dept: OTHER | Facility: HOSPITAL | Age: 83
End: 2023-11-15

## 2023-11-22 ENCOUNTER — OFFICE VISIT (OUTPATIENT)
Dept: INTERNAL MEDICINE CLINIC | Facility: CLINIC | Age: 83
End: 2023-11-22
Payer: MEDICARE

## 2023-11-22 VITALS
HEIGHT: 66 IN | BODY MASS INDEX: 28.61 KG/M2 | DIASTOLIC BLOOD PRESSURE: 82 MMHG | TEMPERATURE: 98 F | WEIGHT: 178 LBS | SYSTOLIC BLOOD PRESSURE: 134 MMHG | HEART RATE: 88 BPM

## 2023-11-22 DIAGNOSIS — R73.01 IMPAIRED FASTING BLOOD SUGAR: ICD-10-CM

## 2023-11-22 DIAGNOSIS — C50.012 MALIGNANT NEOPLASM OF AREOLA OF LEFT BREAST IN FEMALE, UNSPECIFIED ESTROGEN RECEPTOR STATUS (HCC): ICD-10-CM

## 2023-11-22 DIAGNOSIS — I10 ESSENTIAL HYPERTENSION: Primary | ICD-10-CM

## 2023-11-22 DIAGNOSIS — M81.0 SENILE OSTEOPOROSIS: ICD-10-CM

## 2023-11-22 DIAGNOSIS — Z13.820 SCREENING FOR OSTEOPOROSIS: ICD-10-CM

## 2023-11-22 DIAGNOSIS — E78.2 MIXED HYPERLIPIDEMIA: ICD-10-CM

## 2023-11-22 DIAGNOSIS — Z00.00 MEDICARE ANNUAL WELLNESS VISIT, SUBSEQUENT: ICD-10-CM

## 2023-11-22 PROCEDURE — G0439 PPPS, SUBSEQ VISIT: HCPCS | Performed by: INTERNAL MEDICINE

## 2023-11-22 PROCEDURE — 99214 OFFICE O/P EST MOD 30 MIN: CPT | Performed by: INTERNAL MEDICINE

## 2023-11-22 NOTE — PROGRESS NOTES
Answers submitted by the patient for this visit:  Medicare Annual Wellness Visit (Submitted on 11/22/2023)  How would you rate your overall health?: fair  Compared to last year, how is your physical health?: same  In general, how satisfied are you with your life?: satisfied  Compared to last year, how is your eyesight?: same  Compared to last year, how is your hearing?: same  Compared to last year, how is your emotional/mental health?: same  How often is anger a problem for you?: never, rarely  How often do you feel unusually tired/fatigued?: sometimes  In the past 7 days, how much pain have you experienced?: some  If you answered "some" or "a lot", please rate the severity of your pain on a scale of 1 to 10 (1 being the least severe pain and 10 being the most intense pain). : 5/10  In the past 6 months, have you lost or gained 10 pounds without trying?: No  One or more falls in the last year: No  In the past 6 months, have you accidentally leaked urine?: Yes  Do you have trouble with the stairs inside or outside your home?: No  Does your home have working smoke alarms?: Yes  Does your home have a carbon monoxide monitor?: Yes  Which safety hazards (if any) have you experienced in your home? Please select all that apply.: none  How would you describe your current diet?  Please select all that apply.: Low Carb, No Added Salt, Limited junk food  In addition to prescription medications, are you taking any over-the-counter supplements?: Yes  If yes, what supplements are you taking?: VitaminD  Can you manage your medications?: Yes  Are you currently taking any opioid medications?: No  Can you walk and transfer into and out of your bed and chair?: Yes  Can you dress and groom yourself?: Yes  Can you bathe or shower yourself?: Yes  Can you feed yourself?: Yes  Can you do your laundry/ housekeeping?: Yes  Can you manage your money, pay your bills, and track your expenses?: Yes  Can you make your own meals?: Yes  Can you do your own shopping?: Yes  Within the last 12 months, have you had any hospitalizations or Emergency Department visits?: No  Do you have a living will?: No  Do you have a Durable POA (Power of ) for healthcare decisions?: No  Do you have an Advanced Directive for end of life decisions?: No  How often have you used an illegal drug (including marijuana) or a prescription medication for non-medical reasons in the past year?: never  What is the typical number of drinks you consume in a day?: 0  What is the typical number of drinks you consume in a week?: 0  How often did you have a drink containing alcohol in the past year?: never  How many drinks did you have on a typical day  when you were drinking in the past year?: 0  How often did you have 6 or more drinks on one occasion in the past year?: never

## 2023-11-22 NOTE — PROGRESS NOTES
Assessment/Plan:      Depression Screening and Follow-up Plan: Patient was screened for depression during today's encounter. They screened negative with a PHQ-2 score of 0. Falls Plan of Care: balance, strength, and gait training instructions were provided. Urinary Incontinence Plan of Care: counseling topics discussed: use restroom every 2 hours and limit alcohol, caffeine, spicy foods, and acidic foods. 1. Essential hypertension  -     CBC and differential; Future    2. Medicare annual wellness visit, subsequent    3. Screening for osteoporosis    4. Malignant neoplasm of areola of left breast in female, unspecified estrogen receptor status ([address])    5. Mixed hyperlipidemia  -     Comprehensive metabolic panel; Future  -     Lipid Panel with Direct LDL reflex; Future  -     TSH, 3rd generation; Future    6. Senile osteoporosis  -     DXA bone density spine hip and pelvis; Future; Expected date: 11/22/2023    7. Impaired fasting blood sugar  -     Hemoglobin A1C; Future           Subjective:      Patient ID: Noel Graves is a 82 y.o. female. Follow-up on multiple medical problems to ensure they are stable on current medication, also Medicare wellness exam        The following portions of the patient's history were reviewed and updated as appropriate: She  has a past medical history of Asthma, Breast cancer ([address]) (06/13/2019), Esophagitis, GERD (gastroesophageal reflux disease), and Hypertension.   She   Patient Active Problem List    Diagnosis Date Noted    Primary osteoarthritis of right knee 05/24/2023    Senile osteoporosis 07/15/2022    Pain in right elbow 06/10/2022    Stage 3a chronic kidney disease ([address]) 06/10/2022    SOB (shortness of breath) 01/11/2022    Osteoarthritis of spine with radiculopathy, lumbar region 08/06/2021    Essential hypertension 04/23/2021    Malignant neoplasm of areola of left breast in female [facility]) 04/23/2021    Gastroesophageal reflux disease without esophagitis 04/23/2021    Dry skin 04/23/2021    Seasonal allergies 04/23/2021    Mixed hyperlipidemia 04/23/2021    Essential hypertension, benign 04/23/2021    Body mass index 30.0-30.9, adult 10/27/2020    Low back pain due to bilateral sciatica 10/27/2020    Asthma     Encounter for follow-up examination after completed treatment for malignant neoplasm 05/28/2020    Breast cancer ([address]) 06/13/2019    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 Her family history includes Alzheimer's disease in her mother; Dementia in her mother; Multiple sclerosis in her daughter, sister, and sister; No Known Problems in her father, maternal aunt, maternal aunt, maternal aunt, maternal aunt, maternal aunt, maternal aunt, maternal grandfather, maternal grandmother, paternal grandfather, paternal grandmother, sister, sister, sister, and sister. She  reports that she quit smoking about 10 years ago. Her smoking use included cigarettes. She has a 10.00 pack-year smoking history. She has never used smokeless tobacco. She reports that she does not drink alcohol and does not use drugs.   Current Outpatient Medications   Medication Sig Dispense Refill    amLODIPine (NORVASC) 5 mg tablet Take 1 tablet (5 mg total) by mouth daily 90 tablet 2    Ascorbic Acid (vitamin C) 1000 MG tablet Take 1,000 mg by mouth daily      Cholecalciferol (VITAMIN D PO) Take 1,000 Units by mouth daily        No current facility-administered medications for this visit. Current Outpatient Medications on File Prior to Visit   Medication Sig    amLODIPine (NORVASC) 5 mg tablet Take 1 tablet (5 mg total) by mouth daily    Ascorbic Acid (vitamin C) 1000 MG tablet Take 1,000 mg by mouth daily    Cholecalciferol (VITAMIN D PO) Take 1,000 Units by mouth daily      No current facility-administered medications on file prior to visit. She is allergic to hydrochlorothiazide, lisinopril, losartan, and rosuvastatin. .    Review of Systems   Constitutional:  Negative for chills and fever. HENT:  Negative for congestion, ear pain and sore throat. Eyes:  Negative for pain. Respiratory:  Negative for cough and shortness of breath. Cardiovascular:  Negative for chest pain and leg swelling. Gastrointestinal:  Negative for abdominal pain, nausea and vomiting. Endocrine: Negative for polyuria. Genitourinary:  Negative for difficulty urinating, frequency and urgency. Musculoskeletal:  Positive for arthralgias and back pain. Skin:  Negative for rash. Neurological:  Negative for weakness and headaches. Psychiatric/Behavioral:  Negative for sleep disturbance. The patient is not nervous/anxious. Objective:      /82 (BP Location: Left arm, Patient Position: Sitting, Cuff Size: Standard)   Pulse 88   Temp 98 °F (36.7 °C) (Temporal)   Ht 5' 6" (1.676 m)   Wt 80.7 kg (178 lb)   PF 99 L/min   BMI 28.73 kg/m²     No results found for this or any previous visit (from the past 1344 hour(s)). Physical Exam  Constitutional:       Appearance: Normal appearance. HENT:      Head: Normocephalic. Right Ear: Tympanic membrane, ear canal and external ear normal.      Left Ear: Tympanic membrane, ear canal and external ear normal.      Nose: Nose normal. No congestion. Mouth/Throat:      Mouth: Mucous membranes are moist.      Pharynx: Oropharynx is clear. No oropharyngeal exudate or posterior oropharyngeal erythema.    Eyes: Extraocular Movements: Extraocular movements intact. Conjunctiva/sclera: Conjunctivae normal.   Cardiovascular:      Rate and Rhythm: Normal rate and regular rhythm. Heart sounds: Normal heart sounds. No murmur heard. Pulmonary:      Effort: Pulmonary effort is normal.      Breath sounds: Normal breath sounds. No wheezing or rales. Abdominal:      General: Abdomen is flat. There is no distension. Palpations: Abdomen is soft. Tenderness: There is no abdominal tenderness. Musculoskeletal:      Cervical back: Normal range of motion and neck supple. Right lower leg: No edema. Left lower leg: No edema. Lymphadenopathy:      Cervical: No cervical adenopathy. Skin:     General: Skin is warm. Neurological:      General: No focal deficit present. Mental Status: She is alert and oriented to person, place, and time. Answers submitted by the patient for this visit:  Medicare Annual Wellness Visit (Submitted on 11/22/2023)  How would you rate your overall health?: fair  Compared to last year, how is your physical health?: same  In general, how satisfied are you with your life?: satisfied  Compared to last year, how is your eyesight?: same  Compared to last year, how is your hearing?: same  Compared to last year, how is your emotional/mental health?: same  How often is anger a problem for you?: never, rarely  How often do you feel unusually tired/fatigued?: sometimes  In the past 7 days, how much pain have you experienced?: some  If you answered "some" or "a lot", please rate the severity of your pain on a scale of 1 to 10 (1 being the least severe pain and 10 being the most intense pain). : 5/10  In the past 6 months, have you lost or gained 10 pounds without trying?: No  One or more falls in the last year: No  In the past 6 months, have you accidentally leaked urine?: Yes  Do you have trouble with the stairs inside or outside your home?: No  Does your home have working smoke alarms?: Yes  Does your home have a carbon monoxide monitor?: Yes  Which safety hazards (if any) have you experienced in your home? Please select all that apply.: none  How would you describe your current diet?  Please select all that apply.: Low Carb, No Added Salt, Limited junk food  In addition to prescription medications, are you taking any over-the-counter supplements?: Yes  If yes, what supplements are you taking?: VitaminD  Can you manage your medications?: Yes  Are you currently taking any opioid medications?: No  Can you walk and transfer into and out of your bed and chair?: Yes  Can you dress and groom yourself?: Yes  Can you bathe or shower yourself?: Yes  Can you feed yourself?: Yes  Can you do your laundry/ housekeeping?: Yes  Can you manage your money, pay your bills, and track your expenses?: Yes  Can you make your own meals?: Yes  Can you do your own shopping?: Yes  Within the last 12 months, have you had any hospitalizations or Emergency Department visits?: No  Do you have a living will?: No  Do you have a Durable POA (Power of ) for healthcare decisions?: No  Do you have an Advanced Directive for end of life decisions?: No  How often have you used an illegal drug (including marijuana) or a prescription medication for non-medical reasons in the past year?: never  What is the typical number of drinks you consume in a day?: 0  What is the typical number of drinks you consume in a week?: 0  How often did you have a drink containing alcohol in the past year?: never  How many drinks did you have on a typical day  when you were drinking in the past year?: 0  How often did you have 6 or more drinks on one occasion in the past year?: never

## 2024-02-07 ENCOUNTER — HOSPITAL ENCOUNTER (OUTPATIENT)
Dept: MAMMOGRAPHY | Facility: CLINIC | Age: 84
Discharge: HOME | End: 2024-02-07
Payer: MEDICARE

## 2024-02-07 ENCOUNTER — HOSPITAL ENCOUNTER (OUTPATIENT)
Dept: ULTRASOUND IMAGING | Facility: CLINIC | Age: 84
Discharge: HOME | End: 2024-02-07
Payer: MEDICARE

## 2024-02-07 DIAGNOSIS — Z86.000 HISTORY OF DUCTAL CARCINOMA IN SITU (DCIS) OF BREAST: ICD-10-CM

## 2024-02-07 PROCEDURE — 77065 DX MAMMO INCL CAD UNI: CPT

## 2024-02-07 PROCEDURE — G0279 TOMOSYNTHESIS, MAMMO: HCPCS

## 2024-02-07 PROCEDURE — 76642 ULTRASOUND BREAST LIMITED: CPT

## 2024-02-08 NOTE — PROGRESS NOTES
Met with patient and Dr. verma   regarding recommendation for;    __x2___ RIGHT ______LEFT      ___x__Ultrasound guided  ______Stereotactic breast biopsy.      __X___Verbalized understanding.      Blood thinners:  No: ___x__ Yes: ______ What:                 Biopsy teaching sheet given:  Yes: ___X___ No: ________    Pt given contact information and adv to call with any questions/needs    Patient advised to arrive at 130 ... for a .2:00.. appointment

## 2024-02-28 ENCOUNTER — HOSPITAL ENCOUNTER (OUTPATIENT)
Dept: MAMMOGRAPHY | Facility: CLINIC | Age: 84
Discharge: HOME | End: 2024-02-28
Payer: MEDICARE

## 2024-02-28 ENCOUNTER — HOSPITAL ENCOUNTER (OUTPATIENT)
Dept: ULTRASOUND IMAGING | Facility: CLINIC | Age: 84
Discharge: HOME | End: 2024-02-28
Payer: MEDICARE

## 2024-02-28 VITALS — DIASTOLIC BLOOD PRESSURE: 78 MMHG | HEART RATE: 65 BPM | SYSTOLIC BLOOD PRESSURE: 156 MMHG

## 2024-02-28 DIAGNOSIS — R92.8 ABNORMAL MAMMOGRAM: ICD-10-CM

## 2024-02-28 PROCEDURE — 88305 TISSUE EXAM BY PATHOLOGIST: CPT | Performed by: PATHOLOGY

## 2024-02-28 PROCEDURE — 88342 IMHCHEM/IMCYTCHM 1ST ANTB: CPT | Performed by: PATHOLOGY

## 2024-02-28 PROCEDURE — A4648 IMPLANTABLE TISSUE MARKER: HCPCS

## 2024-02-28 PROCEDURE — 19084 BX BREAST ADD LESION US IMAG: CPT

## 2024-02-28 PROCEDURE — 88361 TUMOR IMMUNOHISTOCHEM/COMPUT: CPT | Performed by: PATHOLOGY

## 2024-02-28 PROCEDURE — 19083 BX BREAST 1ST LESION US IMAG: CPT

## 2024-02-28 PROCEDURE — 88341 IMHCHEM/IMCYTCHM EA ADD ANTB: CPT | Performed by: PATHOLOGY

## 2024-02-28 RX ORDER — LIDOCAINE HYDROCHLORIDE 10 MG/ML
5 INJECTION, SOLUTION EPIDURAL; INFILTRATION; INTRACAUDAL; PERINEURAL ONCE
Status: COMPLETED | OUTPATIENT
Start: 2024-02-28 | End: 2024-02-28

## 2024-02-28 RX ADMIN — LIDOCAINE HYDROCHLORIDE 5 ML: 10 INJECTION, SOLUTION EPIDURAL; INFILTRATION; INTRACAUDAL; PERINEURAL at 14:33

## 2024-02-28 RX ADMIN — LIDOCAINE HYDROCHLORIDE 5 ML: 10 INJECTION, SOLUTION EPIDURAL; INFILTRATION; INTRACAUDAL; PERINEURAL at 14:42

## 2024-02-28 NOTE — PROGRESS NOTES
Procedure type: site 1    ___x_ultrasound guided _____stereotactic    Breast:    _____Left ___x__Right    Location: 12 o'clock 4cmfn    Needle:12G    # of passes: 3    Clip: Wing      Procedure type: site 2    ___x_ultrasound guided _____stereotactic    Breast:    _____Left ___x__Right    Location: 12 o'clock Retroareolar    Needle: 14G    # of passes: 4    Clip: Ribbon      Performed by: Dr. Kocher    Pressure held for 5 minutes by: Najma Ling Strips:    ___X__yes _____no    Band aid:    __X___yes_____no    Tolerated procedure:    __X___yes _____no

## 2024-02-29 NOTE — PROGRESS NOTES
Post procedure call completed 2/29/2024 @ 9:51    Bleeding: _____yes __X___no    Pain: _____yes ___X___no    Redness/Swelling: ______yes ___X___no    Band aid removed: _____yes ___X__no (discussed removing when she showers)    Steri-Strips intact: ___X___yes _____no (discussed with patient to remove steri strips on 3/4/2024... if they have not come off on their own)    Pt with no questions at this time, adv will call when results available, adv to call with any questions or concerns, has name/# for contact

## 2024-03-04 PROCEDURE — 88342 IMHCHEM/IMCYTCHM 1ST ANTB: CPT | Performed by: PATHOLOGY

## 2024-03-04 PROCEDURE — 88305 TISSUE EXAM BY PATHOLOGIST: CPT | Performed by: PATHOLOGY

## 2024-03-04 PROCEDURE — 88341 IMHCHEM/IMCYTCHM EA ADD ANTB: CPT | Performed by: PATHOLOGY

## 2024-03-05 ENCOUNTER — TELEPHONE (OUTPATIENT)
Dept: MAMMOGRAPHY | Facility: CLINIC | Age: 84
End: 2024-03-05

## 2024-03-05 ENCOUNTER — DOCUMENTATION (OUTPATIENT)
Dept: HEMATOLOGY ONCOLOGY | Facility: CLINIC | Age: 84
End: 2024-03-05

## 2024-03-05 NOTE — TELEPHONE ENCOUNTER
Call placed to patient after pt given biopsy results from radiologist, questions answered, adv next step is to set patient up with surgeon, options discussed and patient wanted to have appt made with Dr. Ismael Thomas, Hope line notified and will reach out with appointment.   Offered support.  Journal mailed

## 2024-03-05 NOTE — TELEPHONE ENCOUNTER
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 Thomas    Preferred location: [place]    Any requests for dates/times:     Does the patient have a SHERMAN in place: no    Any additional information:     Please send this template to Karoline Valverde,Celeste Wood and Lukas Grier  Please let the nurse know when the patient has been scheduled yes

## 2024-03-05 NOTE — PROGRESS NOTES
Intake received/ Chart reviewed for services completed outside of [facility]    Pathology completed: Yes, biopsy completed on 2-    Imaging completed: Right diagnostic mammogram done on 2-7-2024. Right diagnostic us done on 2-7-2024.     All records needed are in patients chart. No records retrieval needed at this time.

## 2024-03-08 ENCOUNTER — PATIENT OUTREACH (OUTPATIENT)
Dept: HEMATOLOGY ONCOLOGY | Facility: CLINIC | Age: 84
End: 2024-03-08

## 2024-03-08 DIAGNOSIS — C50.911 MALIGNANT NEOPLASM OF RIGHT BREAST IN FEMALE, ESTROGEN RECEPTOR POSITIVE, UNSPECIFIED SITE OF BREAST: Primary | ICD-10-CM

## 2024-03-08 DIAGNOSIS — Z17.0 MALIGNANT NEOPLASM OF RIGHT BREAST IN FEMALE, ESTROGEN RECEPTOR POSITIVE, UNSPECIFIED SITE OF BREAST: Primary | ICD-10-CM

## 2024-03-11 ENCOUNTER — PATIENT OUTREACH (OUTPATIENT)
Dept: CASE MANAGEMENT | Facility: OTHER | Age: 84
End: 2024-03-11

## 2024-03-11 ENCOUNTER — PATIENT OUTREACH (OUTPATIENT)
Dept: HEMATOLOGY ONCOLOGY | Facility: CLINIC | Age: 84
End: 2024-03-11

## 2024-03-11 DIAGNOSIS — D05.11 DUCTAL CARCINOMA IN SITU (DCIS) OF RIGHT BREAST: Primary | ICD-10-CM

## 2024-03-11 NOTE — PROGRESS NOTES
OSW received SW referral. Pt has her consult with Dr. Thomas on 3/14. OSW will place outreach after her consult to complete the distress thermometer and psychosocial assessment.

## 2024-03-11 NOTE — PROGRESS NOTES
"Breast Oncology Nurse Navigator    Patient called in and left the following message:    \"Michael Ramey, this is Eleni Chew and I'm calling you back since you called me on Friday and at your convenience. Please give me another call. I will be in Doctor Thomas's office on Thursday at 1:00, so you have a good day also. Bye, bye.\"    Returned patient's call at this time.  Left a detailed voicemail and requested a return phone call.  Patient returned my call while I was on the phone.  Called patient again.  Introduced myself and my role.      Patient was diagnosed with DCIS of the left breast in 2019.  She had a mastectomy performed at that time by Dr. Thomas.  No radiation or medications.  Patient now diagnosed with DCIS of the right breast.  Understands the diagnosis and does not have any questions at this time.    Confirmed upcoming appointment with Dr. Thomas for this week.  Patient knows when and where to go.  She will be accompanied by her .     Patient lives with her  and son in a multi-generational home.  She has four living children.  States her family are her main support system.  Denies issues with transportation.  Referral already placed to oncology social worker.    Cancer family history includes:  none known.  Patient agreeable to genetic testing when explained in detail.  STAT referral placed to genetics.  Patient knows she can wait to see if Dr Thomas orders any blood work and have blood drawn for genetic testing at that time.    Discussed the Cancer Support Community and some of the programs and services offered, including in person and virtual programming for patients and caregivers.    Patient has my contact information and knows she can reach out with questions.  General assessment completed.  "

## 2024-03-12 ENCOUNTER — TELEPHONE (OUTPATIENT)
Dept: GENETICS | Facility: CLINIC | Age: 84
End: 2024-03-12

## 2024-03-13 ENCOUNTER — TELEPHONE (OUTPATIENT)
Dept: GENETICS | Facility: CLINIC | Age: 84
End: 2024-03-13

## 2024-03-13 PROBLEM — C50.919 BREAST CANCER (HCC): Status: RESOLVED | Noted: 2019-06-13 | Resolved: 2024-03-13

## 2024-03-13 PROBLEM — C50.012 MALIGNANT NEOPLASM OF AREOLA OF LEFT BREAST IN FEMALE (HCC): Status: RESOLVED | Noted: 2021-04-23 | Resolved: 2024-03-13

## 2024-03-13 PROBLEM — D05.11 DUCTAL CARCINOMA IN SITU (DCIS) OF RIGHT BREAST: Status: ACTIVE | Noted: 2024-03-13

## 2024-03-13 PROBLEM — Z08 ENCOUNTER FOR FOLLOW-UP EXAMINATION AFTER COMPLETED TREATMENT FOR MALIGNANT NEOPLASM: Status: RESOLVED | Noted: 2020-05-28 | Resolved: 2024-03-13

## 2024-03-13 NOTE — TELEPHONE ENCOUNTER
I introduced myself to Eleni and let her know that her breast surgeon reached out to the cancer risk and genetics program on her behalf.    I reviewed the following with Eleni:    While the majority of cancer occurs by chance, approximately 5-10% of breast cancer has an underlying genetic cause.  Genetic testing is available which can determine if there is an underlying genetic cause to your cancer.  Understanding if there is an underlying genetic cause can:  Provide your surgeon with additional information to help with surgical decisions, treatment decisions and eligibility for clinical trials.    It can determine if you have an increased risk for any additional cancers.  Help family members understand their cancer risk.       We work closely with the [facility] breast surgeons and are reaching out to see if you have interest in genetic testing. The reason we are reaching out at this time is that this result may help your surgeon determine the appropriate type of surgery (i.e. lumpectomy vs mastectomy). This test is not a requirement but can take 5-10 days to complete so we would like to start the process as soon as possible so the results are ready for your appointment with your surgeon.       If you are interested in genetic testing, I can collect your family history and initiate genetic testing for you.        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 Yes  If yes type/age of diagnosis: DCIS Left Breast age 79    Family history:   Do you have Ashkenazi Denominational ancestry? No  If yes, maternal, paternal, or both?    Do you have any children? Yes  How many sons? 3  How many daughters? 2  Do any of your children have a history of cancer? No  If yes type/age of diagnosis:     Do you have any brothers or sisters? Yes  How many brothers? 4  How many sisters? 6  Are they from the same  parents? No  If no how maternal/paternal half-siblings: Maternal half brother x 4, Maternal Half sister x 5  Do any of your brothers or sisters have a history of cancer? No  If yes who and the type/age of diagnosis:           Do you have nieces or nephews? Yes  Do any of them have a history of cancer? No  If yes type/age of diagnosis:    Does your mother have a history of cancer? No  If yes, cancer type and age of diagnosis:   Is your mother still living? No  Age/Age of death: 80s    Thinking about your mother's family (aunts, uncles, cousins, grandparents) is there anyone with a history of cancer? No  If yes, list relationship, cancer type and age of diagnosis:    Does your father have a history of cancer? No  If yes, cancer type and age of diagnosis:  Is your father still living? No  Age/age of death: 80s    Thinking about your father's family (aunts, uncles, cousins, grandparents) is there anyone with a history of cancer? No  If yes, list relationship, cancer type and age of diagnosis:      Types of Results - Positive, Negative, VUS  Positive Result - May explain personal diagnosis/family history. Can give surgeon information on treatment plan, inform future screening/management or tell a person about other possible risks. Positive results can initiate testing for other family members who may be at risk (children, siblings, etc)  Negative Result - Does not give an explanation. Surgical/treatment plan will be based on clinical presentation and will be part of discussion with surgeon. Negative result cannot be passed down to children, but they are still at elevated risk.  Uncertain Result - Common, but treated like a negative result clinically. 90% are downgraded over time.     [facility] billing policy   Most insurance plans cover the cost of genetic testing. The out-of-pocket cost varies due to the differences in deductibles, co-payments and co-insurance defined by individual plans but 90% of people pay  $100 or less for a genetic test     A blood kit will be mailed to you overnight. Please take the blood kit along with packet of paperwork to any [facility] lab to have your blood drawn.    We have genetic counselors available, if you have any additional questions or would like to speak with them we can schedule you a 15 minute appointment.      Plan:  A blood kit will be collected on 3/14/2024 and will be given information on genetic testing and the lab's billing policy.    Genetic Testing Preformed: [facility] BRCA[facility] STAT Panel (13 genes): JORDEN, BARD1, BRCA1, BRCA2, CDH1, CHEK2, NF1, PALB2, PTEN, RAD51C, RAD51D, STK11, TP53 with reflex to [facility] CustomNext: [facility]+RNA[facility]ight (59 genes): APC, JORDEN, AXIN2, BAP1, BARD1, BMPR1A, BRCA1, BRCA2, BRIP1, CDH1, CDK4, CDKN1B, CDKN2A, CHEK2, CTNNA1, DICER1, EGLN1, EPCAM, FH, FLCN, GREM1, HOXB13, KIF1B, KIT, MAX, MEN1, MET, MITF, MLH1, MSH2, MSH3, MSH6, MUTYH, NF1, NTHL1, PALB2, PDGFRA PMS2, POLD1, POLE, POT1, PTEN, RAD51C, RAD51D, RB1, RET, SDHA, SDHAF2, SDHB, SDHC, SDHD, SMAD4, SMARCA4, STK11, RJYC744, TP53, TSC1, TSC2, VHL     Result Call Information:  I confirmed the patient's mobile number on file as the best number to call with results  I confirmed with the patient that we can leave a voicemail on the provided numbers    Initial results will take approximately 5-12 days to return     Additional results may take up to 2-3 weeks to complete once test is started.    Patient does not have any further questions, declined meeting with genetic counselor    When your results are ready, someone from the genetics team will call you, review the results, and contact your breast surgeon. You will be contacted with any type of result- positive, negative, or uncertain.

## 2024-03-14 ENCOUNTER — CONSULT (OUTPATIENT)
Dept: SURGICAL ONCOLOGY | Facility: CLINIC | Age: 84
End: 2024-03-14
Payer: MEDICARE

## 2024-03-14 VITALS
TEMPERATURE: 97.4 F | SYSTOLIC BLOOD PRESSURE: 150 MMHG | DIASTOLIC BLOOD PRESSURE: 74 MMHG | OXYGEN SATURATION: 98 % | RESPIRATION RATE: 16 BRPM | BODY MASS INDEX: 29.17 KG/M2 | WEIGHT: 181.5 LBS | HEART RATE: 80 BPM | HEIGHT: 66 IN

## 2024-03-14 DIAGNOSIS — D05.11 DUCTAL CARCINOMA IN SITU (DCIS) OF RIGHT BREAST: Primary | ICD-10-CM

## 2024-03-14 DIAGNOSIS — Z86.000 HISTORY OF DUCTAL CARCINOMA IN SITU (DCIS) OF BREAST: ICD-10-CM

## 2024-03-14 PROCEDURE — 99205 OFFICE O/P NEW HI 60 MIN: CPT | Performed by: SURGERY

## 2024-03-14 RX ORDER — OXYCODONE HYDROCHLORIDE AND ACETAMINOPHEN 5; 325 MG/1; MG/1
1 TABLET ORAL EVERY 6 HOURS PRN
Qty: 10 TABLET | Refills: 0 | Status: SHIPPED | OUTPATIENT
Start: 2024-03-14

## 2024-03-14 NOTE — H&P (VIEW-ONLY)
Surgical Oncology Consult Note       [facility] [place]  [address]  [place] [address]    Eleni Chew  1940  [id number]  [facility] [place]  [address]  [place] [address]      Chief Complaint:     Chief Complaint   Patient presents with    Consult    Breast Cancer     New Diagnosis       Assessment and Plan:   Assessment/Plan   Patient presents with a past history of left DCIS treated with mastectomy.  She now has DCIS in the right breast as well as LCIS she has calcifications extending over an area of approximately 5 cm.  The patient I talked about a mastectomy which is her preference.  We went through the process of informed consent for this the DCIS is grade 1 strongly ER/WV positive.  She has a wing clip in the DCIS/LCIS and a ribbon clip in the LCIS only which is at the retroareolar region at 12:00 the DCIS is at the 12 o'clock position 4 cm from the nipple.    Oncology History:     Oncology History   History of ductal carcinoma in situ (DCIS) of breast   5/23/2019 Biopsy    Left breast ultrasound-guided biopsy  10 o'clock, 4 cm from nipple  Ductal Carcinoma in Situ  Grade 2    WV 90     6/13/2019 Biopsy    Left breast biopsy of additional suspicious area  Lower inner quadrant, posterior portion, 8 cm from nipple  Single focus of atypical lobular hyperplasia     7/2/2019 Surgery    Left breast mastectomy with sentinel lymph node biopsy  Ductal carcinoma in situ  4.5 cm  Grade 2  Margins negative  0/1 Lymph nodes  Stage 0     Ductal carcinoma in situ (DCIS) of right breast   2/28/2024 Biopsy    Right breast ultrasound-guided biopsy  A. 12 o'clock, 4 cm from nipple (wing)  Ductal carcinoma in situ  Grade 1  ER 90, WV 90    B. 12 o'clock, retroareolar (ribbon)  Lobular carcinoma in situ    Concordant. Malignancy appears multifocal; masses to include DCIS  and LCIS cover an area of 5 cm. US right axilla negative.          History of Present Illness:   This is a 83-year-old woman who in 2019 had DCIS and underwent a mastectomy.  She canceled her follow-ups at that time and now presents with contralateral multifocal DCIS and LCIS with relatively extensive calcifications over 5 cm area we talked about trying to conserve the breast but she was fine being flat bilaterally.  She would have relatively large lateral flap tissue but she is comfortable with this on the contralateral side so we would not consult plastic surgery.    Review of Systems:   Review of Systems   Constitutional:  Negative for chills and fever.   HENT:  Negative for ear pain and sore throat.    Eyes:  Negative for pain and visual disturbance.   Respiratory:  Negative for cough and shortness of breath.    Cardiovascular:  Negative for chest pain and palpitations.   Gastrointestinal:  Negative for abdominal pain and vomiting.   Genitourinary:  Negative for dysuria and hematuria.   Musculoskeletal:  Negative for arthralgias and back pain.   Skin:  Negative for color change and rash.   Neurological:  Negative for seizures and syncope.   All other systems reviewed and are negative.      Past Medical History:      Patient Active Problem List   Diagnosis    Hypertension    GERD (gastroesophageal reflux disease)    Esophagitis    Hiatal hernia    History of ductal carcinoma in situ (DCIS) of breast    Asthma    Body mass index 30.0-30.9, adult    Low back pain due to bilateral sciatica    Essential hypertension    Gastroesophageal reflux disease without esophagitis    Dry skin    Seasonal allergies    Mixed hyperlipidemia    Essential hypertension, benign    Osteoarthritis of spine with radiculopathy, lumbar region    SOB (shortness of breath)    Pain in right elbow    Stage 3a chronic kidney disease (HCC)    Senile osteoporosis    Primary osteoarthritis of right knee    Ductal carcinoma in situ (DCIS) of right  breast        Past Medical History:   Diagnosis Date    Asthma     as a child    Breast cancer (HCC) 06/13/2019    Chronic kidney disease 2022    Esophagitis     GERD (gastroesophageal reflux disease)     Hypertension         Past Surgical History:   Procedure Laterality Date    BREAST LUMPECTOMY Left 1978    benign    DENTAL SURGERY      ESOPHAGOGASTRODUODENOSCOPY N/A 3/19/2018    Procedure: ESOPHAGOGASTRODUODENOSCOPY (EGD);  Surgeon: Farshad Jama MD;  Location: [facility];  Service: Gastroenterology    MAMMO STEREOTACTIC BREAST BIOPSY LEFT (ALL INC) Left 6/13/2019    MASTECTOMY Left 07/02/2019    MASTECTOMY W/ SENTINEL NODE BIOPSY Left 7/2/2019    Procedure: BREAST MASTECTOMY WITH SENTINEL LYMPH NODE BIOPSY, LYMPHATIC MAPPING WITH BLUE DYE AND RADIOACTIVE DYE (INJECT AT 0900 BY DR THOMAS IN THE OR);  Surgeon: Ismael Thomas MD;  Location: [facility];  Service: Surgical Oncology    TONSILLECTOMY      US GUIDANCE BREAST BIOPSY RIGHT EACH ADDITIONAL Right 2/28/2024    US GUIDED BREAST BIOPSY LEFT COMPLETE Left 5/23/2019    US GUIDED BREAST BIOPSY RIGHT COMPLETE Right 2/28/2024        Family History   Problem Relation Age of Onset    Alzheimer's disease Mother     Dementia Mother     No Known Problems Father     No Known Problems Sister     No Known Problems Sister     Multiple sclerosis Sister     Multiple sclerosis Sister     No Known Problems Sister     No Known Problems Sister     Multiple sclerosis Daughter     No Known Problems Maternal Aunt     No Known Problems Maternal Aunt     No Known Problems Maternal Aunt     No Known Problems Maternal Aunt     No Known Problems Maternal Aunt     No Known Problems Maternal Aunt     No Known Problems Maternal Grandmother     No Known Problems Maternal Grandfather     No Known Problems Paternal Grandmother     No Known Problems Paternal Grandfather         Social History     Socioeconomic History    Marital status: /Civil Union     Spouse name: Not on file    Number of  children: Not on file    Years of education: Not on file    Highest education level: Not on file   Occupational History    Not on file   Tobacco Use    Smoking status: Former     Current packs/day: 0.00     Average packs/day: 0.5 packs/day for 20.0 years (10.0 ttl pk-yrs)     Types: Cigarettes     Start date:      Quit date:      Years since quittin.2    Smokeless tobacco: Never   Vaping Use    Vaping status: Never Used   Substance and Sexual Activity    Alcohol use: No    Drug use: No    Sexual activity: Not Currently     Partners: Male     Birth control/protection: None   Other Topics Concern    Not on file   Social History Narrative    Not on file     Social Determinants of Health     Financial Resource Strain: Low Risk  (2023)    Overall Financial Resource Strain (CARDIA)     Difficulty of Paying Living Expenses: Not hard at all   Food Insecurity: Not on file   Transportation Needs: No Transportation Needs (2023)    PRAPARE - Transportation     Lack of Transportation (Medical): No     Lack of Transportation (Non-Medical): No   Physical Activity: Not on file   Stress: Not on file   Social Connections: Not on file   Intimate Partner Violence: Not on file   Housing Stability: Not on file        Current Outpatient Medications:     amLODIPine (NORVASC) 5 mg tablet, Take 1 tablet (5 mg total) by mouth daily, Disp: 90 tablet, Rfl: 2    Ascorbic Acid (vitamin C) 1000 MG tablet, Take 1,000 mg by mouth daily, Disp: , Rfl:     Cholecalciferol (VITAMIN D PO), Take 1,000 Units by mouth daily , Disp: , Rfl:      Allergies   Allergen Reactions    Hydrochlorothiazide Other (See Comments)     Kidney number goes up    Lisinopril     Losartan Other (See Comments)     Kidney number goes up    Rosuvastatin Nausea Only       Physical Exam:     Vitals:    24 1248   BP: 150/74   Pulse: 80   Resp: 16   Temp: (!) 97.4 °F (36.3 °C)   SpO2: 98%     Physical Exam  Chest:          Comments: The right breast was  examined in the sitting and supine position.  There are no worrisome skin changes, tenderness, inverted nipple, nipple discharge, swelling, bleeding or evidence of a mass in any quadrant.  Rashawn survey demonstrated no evidence of any clinically suspicious axillary, pectoral or paraclavicular lymph nodes.    Examination left mastectomy site demonstrates no worrisome findings no skin changes dominant masses or axillary adenopathy.        Results discussion:   I reviewed her mammogram and pathology reports.  They are concordant.  For the multifocality of her calcifications and at least DCIS I have recommended a mastectomy.  The patient is in agreement with this.  We will coordinate this at her next mutual convenience.    The patient and I underwent the process of consent for right mastectomy, intraoperative lymphatic mapping with blue and radioactive dye, sentinel lymph node biopsy, possible axillary dissection.  The complications outlined on the consent including relatively minor problems (wound infection, wound healing problems, hematomas, scarring, chronic discomfort/pain), moderate problems (injury to nerves or blood vessels, allergic reactions) and major complications (extensive blood loss requiring transfusions or possible addtional surgeries, cardiac arrest, stroke and possible death).  We also reviewed specific complications as outlined on the consent form including possible cancer recurrence, arm swelling, need for adjuvant therapies and/or surgery.  All questions were answered to the patient's satisfaction.  We will coordinate the surgery at our next mutual convience.    Advance Care Planning/Advance Directives:  I discussed the disease status, treatment plans and follow-up with the patient.

## 2024-03-14 NOTE — PROGRESS NOTES
Surgical Oncology Consult Note       [facility] [place]  [address]  [place] [address]    Eleni Chew  1940  [id number]  [facility] [place]  [address]  [place] [address]      Chief Complaint:     Chief Complaint   Patient presents with    Consult    Breast Cancer     New Diagnosis       Assessment and Plan:   Assessment/Plan   Patient presents with a past history of left DCIS treated with mastectomy.  She now has DCIS in the right breast as well as LCIS she has calcifications extending over an area of approximately 5 cm.  The patient I talked about a mastectomy which is her preference.  We went through the process of informed consent for this the DCIS is grade 1 strongly ER/DE positive.  She has a wing clip in the DCIS/LCIS and a ribbon clip in the LCIS only which is at the retroareolar region at 12:00 the DCIS is at the 12 o'clock position 4 cm from the nipple.    Oncology History:     Oncology History   History of ductal carcinoma in situ (DCIS) of breast   5/23/2019 Biopsy    Left breast ultrasound-guided biopsy  10 o'clock, 4 cm from nipple  Ductal Carcinoma in Situ  Grade 2    DE 90     6/13/2019 Biopsy    Left breast biopsy of additional suspicious area  Lower inner quadrant, posterior portion, 8 cm from nipple  Single focus of atypical lobular hyperplasia     7/2/2019 Surgery    Left breast mastectomy with sentinel lymph node biopsy  Ductal carcinoma in situ  4.5 cm  Grade 2  Margins negative  0/1 Lymph nodes  Stage 0     Ductal carcinoma in situ (DCIS) of right breast   2/28/2024 Biopsy    Right breast ultrasound-guided biopsy  A. 12 o'clock, 4 cm from nipple (wing)  Ductal carcinoma in situ  Grade 1  ER 90, DE 90    B. 12 o'clock, retroareolar (ribbon)  Lobular carcinoma in situ    Concordant. Malignancy appears multifocal; masses to include DCIS  and LCIS cover an area of 5 cm. US right axilla negative.          History of Present Illness:   This is a 83-year-old woman who in 2019 had DCIS and underwent a mastectomy.  She canceled her follow-ups at that time and now presents with contralateral multifocal DCIS and LCIS with relatively extensive calcifications over 5 cm area we talked about trying to conserve the breast but she was fine being flat bilaterally.  She would have relatively large lateral flap tissue but she is comfortable with this on the contralateral side so we would not consult plastic surgery.    Review of Systems:   Review of Systems   Constitutional:  Negative for chills and fever.   HENT:  Negative for ear pain and sore throat.    Eyes:  Negative for pain and visual disturbance.   Respiratory:  Negative for cough and shortness of breath.    Cardiovascular:  Negative for chest pain and palpitations.   Gastrointestinal:  Negative for abdominal pain and vomiting.   Genitourinary:  Negative for dysuria and hematuria.   Musculoskeletal:  Negative for arthralgias and back pain.   Skin:  Negative for color change and rash.   Neurological:  Negative for seizures and syncope.   All other systems reviewed and are negative.      Past Medical History:      Patient Active Problem List   Diagnosis    Hypertension    GERD (gastroesophageal reflux disease)    Esophagitis    Hiatal hernia    History of ductal carcinoma in situ (DCIS) of breast    Asthma    Body mass index 30.0-30.9, adult    Low back pain due to bilateral sciatica    Essential hypertension    Gastroesophageal reflux disease without esophagitis    Dry skin    Seasonal allergies    Mixed hyperlipidemia    Essential hypertension, benign    Osteoarthritis of spine with radiculopathy, lumbar region    SOB (shortness of breath)    Pain in right elbow    Stage 3a chronic kidney disease (HCC)    Senile osteoporosis    Primary osteoarthritis of right knee    Ductal carcinoma in situ (DCIS) of right  breast        Past Medical History:   Diagnosis Date    Asthma     as a child    Breast cancer (HCC) 06/13/2019    Chronic kidney disease 2022    Esophagitis     GERD (gastroesophageal reflux disease)     Hypertension         Past Surgical History:   Procedure Laterality Date    BREAST LUMPECTOMY Left 1978    benign    DENTAL SURGERY      ESOPHAGOGASTRODUODENOSCOPY N/A 3/19/2018    Procedure: ESOPHAGOGASTRODUODENOSCOPY (EGD);  Surgeon: Farshad Jama MD;  Location: [facility];  Service: Gastroenterology    MAMMO STEREOTACTIC BREAST BIOPSY LEFT (ALL INC) Left 6/13/2019    MASTECTOMY Left 07/02/2019    MASTECTOMY W/ SENTINEL NODE BIOPSY Left 7/2/2019    Procedure: BREAST MASTECTOMY WITH SENTINEL LYMPH NODE BIOPSY, LYMPHATIC MAPPING WITH BLUE DYE AND RADIOACTIVE DYE (INJECT AT 0900 BY DR THOMAS IN THE OR);  Surgeon: Ismael Thomas MD;  Location[facility];  Service: Surgical Oncology    TONSILLECTOMY      US GUIDANCE BREAST BIOPSY RIGHT EACH ADDITIONAL Right 2/28/2024    US GUIDED BREAST BIOPSY LEFT COMPLETE Left 5/23/2019    US GUIDED BREAST BIOPSY RIGHT COMPLETE Right 2/28/2024        Family History   Problem Relation Age of Onset    Alzheimer's disease Mother     Dementia Mother     No Known Problems Father     No Known Problems Sister     No Known Problems Sister     Multiple sclerosis Sister     Multiple sclerosis Sister     No Known Problems Sister     No Known Problems Sister     Multiple sclerosis Daughter     No Known Problems Maternal Aunt     No Known Problems Maternal Aunt     No Known Problems Maternal Aunt     No Known Problems Maternal Aunt     No Known Problems Maternal Aunt     No Known Problems Maternal Aunt     No Known Problems Maternal Grandmother     No Known Problems Maternal Grandfather     No Known Problems Paternal Grandmother     No Known Problems Paternal Grandfather         Social History     Socioeconomic History    Marital status: /Civil Union     Spouse name: Not on file    Number of  children: Not on file    Years of education: Not on file    Highest education level: Not on file   Occupational History    Not on file   Tobacco Use    Smoking status: Former     Current packs/day: 0.00     Average packs/day: 0.5 packs/day for 20.0 years (10.0 ttl pk-yrs)     Types: Cigarettes     Start date:      Quit date:      Years since quittin.2    Smokeless tobacco: Never   Vaping Use    Vaping status: Never Used   Substance and Sexual Activity    Alcohol use: No    Drug use: No    Sexual activity: Not Currently     Partners: Male     Birth control/protection: None   Other Topics Concern    Not on file   Social History Narrative    Not on file     Social Determinants of Health     Financial Resource Strain: Low Risk  (2023)    Overall Financial Resource Strain (CARDIA)     Difficulty of Paying Living Expenses: Not hard at all   Food Insecurity: Not on file   Transportation Needs: No Transportation Needs (2023)    PRAPARE - Transportation     Lack of Transportation (Medical): No     Lack of Transportation (Non-Medical): No   Physical Activity: Not on file   Stress: Not on file   Social Connections: Not on file   Intimate Partner Violence: Not on file   Housing Stability: Not on file        Current Outpatient Medications:     amLODIPine (NORVASC) 5 mg tablet, Take 1 tablet (5 mg total) by mouth daily, Disp: 90 tablet, Rfl: 2    Ascorbic Acid (vitamin C) 1000 MG tablet, Take 1,000 mg by mouth daily, Disp: , Rfl:     Cholecalciferol (VITAMIN D PO), Take 1,000 Units by mouth daily , Disp: , Rfl:      Allergies   Allergen Reactions    Hydrochlorothiazide Other (See Comments)     Kidney number goes up    Lisinopril     Losartan Other (See Comments)     Kidney number goes up    Rosuvastatin Nausea Only       Physical Exam:     Vitals:    24 1248   BP: 150/74   Pulse: 80   Resp: 16   Temp: (!) 97.4 °F (36.3 °C)   SpO2: 98%     Physical Exam  Chest:          Comments: The right breast was  examined in the sitting and supine position.  There are no worrisome skin changes, tenderness, inverted nipple, nipple discharge, swelling, bleeding or evidence of a mass in any quadrant.  Rashawn survey demonstrated no evidence of any clinically suspicious axillary, pectoral or paraclavicular lymph nodes.    Examination left mastectomy site demonstrates no worrisome findings no skin changes dominant masses or axillary adenopathy.        Results discussion:   I reviewed her mammogram and pathology reports.  They are concordant.  For the multifocality of her calcifications and at least DCIS I have recommended a mastectomy.  The patient is in agreement with this.  We will coordinate this at her next mutual convenience.    The patient and I underwent the process of consent for right mastectomy, intraoperative lymphatic mapping with blue and radioactive dye, sentinel lymph node biopsy, possible axillary dissection.  The complications outlined on the consent including relatively minor problems (wound infection, wound healing problems, hematomas, scarring, chronic discomfort/pain), moderate problems (injury to nerves or blood vessels, allergic reactions) and major complications (extensive blood loss requiring transfusions or possible addtional surgeries, cardiac arrest, stroke and possible death).  We also reviewed specific complications as outlined on the consent form including possible cancer recurrence, arm swelling, need for adjuvant therapies and/or surgery.  All questions were answered to the patient's satisfaction.  We will coordinate the surgery at our next mutual convience.    Advance Care Planning/Advance Directives:  I discussed the disease status, treatment plans and follow-up with the patient.

## 2024-03-18 ENCOUNTER — TELEPHONE (OUTPATIENT)
Dept: SURGICAL ONCOLOGY | Facility: CLINIC | Age: 84
End: 2024-03-18

## 2024-03-18 ENCOUNTER — PATIENT OUTREACH (OUTPATIENT)
Dept: CASE MANAGEMENT | Facility: OTHER | Age: 84
End: 2024-03-18

## 2024-03-18 NOTE — TELEPHONE ENCOUNTER
Called the patient to offer her a sooner surgery date with Dr. Thomas but there was no answer. A message was left with a direct call back number provided.

## 2024-03-18 NOTE — PROGRESS NOTES
OSW placed outreach TC to pt this morning. OSW received pts VM. Detailed VM was left requesting a return TC.

## 2024-03-18 NOTE — TELEPHONE ENCOUNTER
The patient returned the phone call. She accepted the sooner surgery date of 3/28 with Dr. Thomas and is aware that her surgery on 4/16 will be cancelled. Her post-op appointment was rescheduled to 4/15 at 3:00. The patient was appreciative of the sooner date and verbalized understanding of appointment details. She will have all of her pre-admission testing completed by this Wednesday, 3/20, at the absolute latest.

## 2024-03-19 ENCOUNTER — PATIENT OUTREACH (OUTPATIENT)
Dept: CASE MANAGEMENT | Facility: OTHER | Age: 84
End: 2024-03-19

## 2024-03-19 NOTE — PROGRESS NOTES
OSW received staff message that a referral for [facility] was placed. Pts surgery is scheduled for 3/28. OSW will continue to follow for acceptance of services.

## 2024-03-20 ENCOUNTER — HOSPITAL ENCOUNTER (OUTPATIENT)
Dept: RADIOLOGY | Facility: HOSPITAL | Age: 84
Discharge: HOME | End: 2024-03-20
Payer: MEDICARE

## 2024-03-20 ENCOUNTER — APPOINTMENT (OUTPATIENT)
Dept: LAB | Facility: CLINIC | Age: 84
End: 2024-03-20
Payer: MEDICARE

## 2024-03-20 DIAGNOSIS — D05.11 DUCTAL CARCINOMA IN SITU (DCIS) OF RIGHT BREAST: ICD-10-CM

## 2024-03-20 LAB
ALBUMIN SERPL BCP-MCNC: 4 G/DL (ref 3.5–5)
ALP SERPL-CCNC: 110 U/L (ref 34–104)
ALT SERPL W P-5'-P-CCNC: 9 U/L (ref 7–52)
ANION GAP SERPL CALCULATED.3IONS-SCNC: 6 MMOL/L (ref 4–13)
AST SERPL W P-5'-P-CCNC: 15 U/L (ref 13–39)
BASOPHILS # BLD AUTO: 0.04 THOUSANDS/ÂΜL (ref 0–0.1)
BASOPHILS NFR BLD AUTO: 1 % (ref 0–1)
BILIRUB SERPL-MCNC: 1.14 MG/DL (ref 0.2–1)
BUN SERPL-MCNC: 16 MG/DL (ref 5–25)
CALCIUM SERPL-MCNC: 9.6 MG/DL (ref 8.4–10.2)
CHLORIDE SERPL-SCNC: 103 MMOL/L (ref 96–108)
CO2 SERPL-SCNC: 30 MMOL/L (ref 21–32)
CREAT SERPL-MCNC: 0.88 MG/DL (ref 0.6–1.3)
EOSINOPHIL # BLD AUTO: 0.17 THOUSAND/ÂΜL (ref 0–0.61)
EOSINOPHIL NFR BLD AUTO: 3 % (ref 0–6)
ERYTHROCYTE [DISTWIDTH] IN BLOOD BY AUTOMATED COUNT: 13.8 % (ref 11.6–15.1)
GFR SERPL CREATININE-BSD FRML MDRD: 61 ML/MIN/1.73SQ M
GLUCOSE SERPL-MCNC: 97 MG/DL (ref 65–140)
HCT VFR BLD AUTO: 39.3 % (ref 34.8–46.1)
HGB BLD-MCNC: 12 G/DL (ref 11.5–15.4)
IMM GRANULOCYTES # BLD AUTO: 0.02 THOUSAND/UL (ref 0–0.2)
IMM GRANULOCYTES NFR BLD AUTO: 0 % (ref 0–2)
LYMPHOCYTES # BLD AUTO: 2.3 THOUSANDS/ÂΜL (ref 0.6–4.47)
LYMPHOCYTES NFR BLD AUTO: 34 % (ref 14–44)
MCH RBC QN AUTO: 27.8 PG (ref 26.8–34.3)
MCHC RBC AUTO-ENTMCNC: 30.5 G/DL (ref 31.4–37.4)
MCV RBC AUTO: 91 FL (ref 82–98)
MONOCYTES # BLD AUTO: 0.44 THOUSAND/ÂΜL (ref 0.17–1.22)
MONOCYTES NFR BLD AUTO: 7 % (ref 4–12)
NEUTROPHILS # BLD AUTO: 3.77 THOUSANDS/ÂΜL (ref 1.85–7.62)
NEUTS SEG NFR BLD AUTO: 55 % (ref 43–75)
NRBC BLD AUTO-RTO: 0 /100 WBCS
PLATELET # BLD AUTO: 321 THOUSANDS/UL (ref 149–390)
PMV BLD AUTO: 9.3 FL (ref 8.9–12.7)
POTASSIUM SERPL-SCNC: 4.3 MMOL/L (ref 3.5–5.3)
PROT SERPL-MCNC: 7.4 G/DL (ref 6.4–8.4)
RBC # BLD AUTO: 4.32 MILLION/UL (ref 3.81–5.12)
SODIUM SERPL-SCNC: 139 MMOL/L (ref 135–147)
WBC # BLD AUTO: 6.74 THOUSAND/UL (ref 4.31–10.16)

## 2024-03-20 PROCEDURE — 85025 COMPLETE CBC W/AUTO DIFF WBC: CPT

## 2024-03-20 PROCEDURE — 71046 X-RAY EXAM CHEST 2 VIEWS: CPT

## 2024-03-20 PROCEDURE — 80053 COMPREHEN METABOLIC PANEL: CPT

## 2024-03-20 PROCEDURE — 36415 COLL VENOUS BLD VENIPUNCTURE: CPT

## 2024-03-21 LAB
ATRIAL RATE: 69 BPM
P AXIS: 40 DEGREES
PR INTERVAL: 224 MS
QRS AXIS: 14 DEGREES
QRSD INTERVAL: 80 MS
QT INTERVAL: 400 MS
QTC INTERVAL: 428 MS
T WAVE AXIS: 99 DEGREES
VENTRICULAR RATE: 69 BPM

## 2024-03-25 ENCOUNTER — PATIENT OUTREACH (OUTPATIENT)
Dept: CASE MANAGEMENT | Facility: OTHER | Age: 84
End: 2024-03-25

## 2024-03-26 ENCOUNTER — PATIENT OUTREACH (OUTPATIENT)
Dept: CASE MANAGEMENT | Facility: OTHER | Age: 84
End: 2024-03-26

## 2024-03-26 ENCOUNTER — HOME HEALTH ADMISSION (OUTPATIENT)
Dept: HOME HEALTH SERVICES | Facility: HOME HEALTHCARE | Age: 84
End: 2024-03-26
Payer: MEDICARE

## 2024-03-26 NOTE — PROGRESS NOTES
OSW placed TC to [facility] this morning regarding acceptance for services. [facility] did not have the updated surgery date of 3/28. She stated that she will complete the referral now and assured this writer the pt will be accepted with a SOC date of 4/1. OSW emailed the inpatient team and informed them of above.

## 2024-03-28 ENCOUNTER — APPOINTMENT (OUTPATIENT)
Dept: RADIOLOGY | Facility: HOSPITAL | Age: 84
End: 2024-03-28
Payer: MEDICARE

## 2024-03-28 ENCOUNTER — ANESTHESIA EVENT (OUTPATIENT)
Dept: PERIOP | Facility: HOSPITAL | Age: 84
End: 2024-03-28
Payer: MEDICARE

## 2024-03-28 ENCOUNTER — ANESTHESIA (OUTPATIENT)
Dept: PERIOP | Facility: HOSPITAL | Age: 84
End: 2024-03-28
Payer: MEDICARE

## 2024-03-28 ENCOUNTER — HOSPITAL ENCOUNTER (OUTPATIENT)
Facility: HOSPITAL | Age: 84
Setting detail: OUTPATIENT SURGERY
Discharge: HOME | End: 2024-03-29
Attending: SURGERY | Admitting: SURGERY
Payer: MEDICARE

## 2024-03-28 ENCOUNTER — HOSPITAL ENCOUNTER (OUTPATIENT)
Dept: NUCLEAR MEDICINE | Facility: HOSPITAL | Age: 84
Discharge: HOME | End: 2024-03-28
Attending: SURGERY
Payer: MEDICARE

## 2024-03-28 ENCOUNTER — HOSPITAL ENCOUNTER (OUTPATIENT)
Dept: RADIOLOGY | Facility: AMBULARY SURGERY CENTER | Age: 84
End: 2024-03-28
Payer: MEDICARE

## 2024-03-28 DIAGNOSIS — D05.11 DUCTAL CARCINOMA IN SITU (DCIS) OF RIGHT BREAST: Primary | ICD-10-CM

## 2024-03-28 DIAGNOSIS — D05.11 INTRADUCTAL CARCINOMA IN SITU OF RIGHT BREAST: ICD-10-CM

## 2024-03-28 PROCEDURE — 88342 IMHCHEM/IMCYTCHM 1ST ANTB: CPT | Performed by: PATHOLOGY

## 2024-03-28 PROCEDURE — 88341 IMHCHEM/IMCYTCHM EA ADD ANTB: CPT | Performed by: PATHOLOGY

## 2024-03-28 PROCEDURE — A9541 TC99M SULFUR COLLOID: HCPCS

## 2024-03-28 PROCEDURE — 88307 TISSUE EXAM BY PATHOLOGIST: CPT | Performed by: PATHOLOGY

## 2024-03-28 PROCEDURE — 38792 RA TRACER ID OF SENTINL NODE: CPT

## 2024-03-28 PROCEDURE — 88333 PATH CONSLTJ SURG CYTO XM 1: CPT | Performed by: PATHOLOGY

## 2024-03-28 RX ORDER — LIDOCAINE HYDROCHLORIDE 10 MG/ML
INJECTION, SOLUTION EPIDURAL; INFILTRATION; INTRACAUDAL; PERINEURAL AS NEEDED
Status: DISCONTINUED | OUTPATIENT
Start: 2024-03-28 | End: 2024-03-28

## 2024-03-28 RX ORDER — ONDANSETRON 2 MG/ML
INJECTION INTRAMUSCULAR; INTRAVENOUS AS NEEDED
Status: DISCONTINUED | OUTPATIENT
Start: 2024-03-28 | End: 2024-03-28

## 2024-03-28 RX ORDER — HYDROMORPHONE HCL IN WATER/PF 6 MG/30 ML
0.2 PATIENT CONTROLLED ANALGESIA SYRINGE INTRAVENOUS EVERY 2 HOUR PRN
Status: DISCONTINUED | OUTPATIENT
Start: 2024-03-28 | End: 2024-03-29 | Stop reason: HOSPADM

## 2024-03-28 RX ORDER — MAGNESIUM HYDROXIDE 1200 MG/15ML
LIQUID ORAL AS NEEDED
Status: DISCONTINUED | OUTPATIENT
Start: 2024-03-28 | End: 2024-03-28 | Stop reason: HOSPADM

## 2024-03-28 RX ORDER — SODIUM CHLORIDE, SODIUM LACTATE, POTASSIUM CHLORIDE, CALCIUM CHLORIDE 600; 310; 30; 20 MG/100ML; MG/100ML; MG/100ML; MG/100ML
50 INJECTION, SOLUTION INTRAVENOUS CONTINUOUS
Status: DISCONTINUED | OUTPATIENT
Start: 2024-03-28 | End: 2024-03-29 | Stop reason: HOSPADM

## 2024-03-28 RX ORDER — ACETAMINOPHEN 325 MG/1
650 TABLET ORAL EVERY 4 HOURS PRN
Status: DISCONTINUED | OUTPATIENT
Start: 2024-03-28 | End: 2024-03-29 | Stop reason: HOSPADM

## 2024-03-28 RX ORDER — HYDROMORPHONE HCL/PF 1 MG/ML
0.5 SYRINGE (ML) INJECTION
Status: DISCONTINUED | OUTPATIENT
Start: 2024-03-28 | End: 2024-03-28 | Stop reason: HOSPADM

## 2024-03-28 RX ORDER — FENTANYL CITRATE 50 UG/ML
INJECTION, SOLUTION INTRAMUSCULAR; INTRAVENOUS AS NEEDED
Status: DISCONTINUED | OUTPATIENT
Start: 2024-03-28 | End: 2024-03-28

## 2024-03-28 RX ORDER — PROPOFOL 10 MG/ML
INJECTION, EMULSION INTRAVENOUS AS NEEDED
Status: DISCONTINUED | OUTPATIENT
Start: 2024-03-28 | End: 2024-03-28

## 2024-03-28 RX ORDER — DEXAMETHASONE SODIUM PHOSPHATE 10 MG/ML
INJECTION, SOLUTION INTRAMUSCULAR; INTRAVENOUS AS NEEDED
Status: DISCONTINUED | OUTPATIENT
Start: 2024-03-28 | End: 2024-03-28

## 2024-03-28 RX ORDER — SODIUM CHLORIDE, SODIUM LACTATE, POTASSIUM CHLORIDE, CALCIUM CHLORIDE 600; 310; 30; 20 MG/100ML; MG/100ML; MG/100ML; MG/100ML
INJECTION, SOLUTION INTRAVENOUS CONTINUOUS PRN
Status: DISCONTINUED | OUTPATIENT
Start: 2024-03-28 | End: 2024-03-28

## 2024-03-28 RX ORDER — AMLODIPINE BESYLATE 5 MG/1
5 TABLET ORAL DAILY
Status: DISCONTINUED | OUTPATIENT
Start: 2024-03-29 | End: 2024-03-29 | Stop reason: HOSPADM

## 2024-03-28 RX ORDER — FENTANYL CITRATE/PF 50 MCG/ML
25 SYRINGE (ML) INJECTION
Status: DISCONTINUED | OUTPATIENT
Start: 2024-03-28 | End: 2024-03-28 | Stop reason: HOSPADM

## 2024-03-28 RX ORDER — PHENYLEPHRINE HCL IN 0.9% NACL 1 MG/10 ML
SYRINGE (ML) INTRAVENOUS AS NEEDED
Status: DISCONTINUED | OUTPATIENT
Start: 2024-03-28 | End: 2024-03-28

## 2024-03-28 RX ORDER — ONDANSETRON 2 MG/ML
4 INJECTION INTRAMUSCULAR; INTRAVENOUS ONCE AS NEEDED
Status: DISCONTINUED | OUTPATIENT
Start: 2024-03-28 | End: 2024-03-28 | Stop reason: HOSPADM

## 2024-03-28 RX ORDER — OXYCODONE HYDROCHLORIDE 5 MG/1
5 TABLET ORAL EVERY 4 HOURS PRN
Status: DISCONTINUED | OUTPATIENT
Start: 2024-03-28 | End: 2024-03-29 | Stop reason: HOSPADM

## 2024-03-28 RX ORDER — BUPIVACAINE HYDROCHLORIDE AND EPINEPHRINE 2.5; 5 MG/ML; UG/ML
INJECTION, SOLUTION INFILTRATION; PERINEURAL AS NEEDED
Status: DISCONTINUED | OUTPATIENT
Start: 2024-03-28 | End: 2024-03-28 | Stop reason: HOSPADM

## 2024-03-28 RX ORDER — EPHEDRINE SULFATE 50 MG/ML
INJECTION INTRAVENOUS AS NEEDED
Status: DISCONTINUED | OUTPATIENT
Start: 2024-03-28 | End: 2024-03-28

## 2024-03-28 RX ORDER — CEFAZOLIN SODIUM 2 G/50ML
2000 SOLUTION INTRAVENOUS ONCE
Status: COMPLETED | OUTPATIENT
Start: 2024-03-28 | End: 2024-03-28

## 2024-03-28 RX ADMIN — LIDOCAINE HYDROCHLORIDE 50 MG: 10 INJECTION, SOLUTION EPIDURAL; INFILTRATION; INTRACAUDAL; PERINEURAL at 11:29

## 2024-03-28 RX ADMIN — PROPOFOL 50 MCG/KG/MIN: 10 INJECTION, EMULSION INTRAVENOUS at 11:36

## 2024-03-28 RX ADMIN — EPHEDRINE SULFATE 10 MG: 50 INJECTION INTRAVENOUS at 12:33

## 2024-03-28 RX ADMIN — FENTANYL CITRATE 25 MCG: 50 INJECTION INTRAMUSCULAR; INTRAVENOUS at 11:53

## 2024-03-28 RX ADMIN — SODIUM CHLORIDE, SODIUM LACTATE, POTASSIUM CHLORIDE, AND CALCIUM CHLORIDE: .6; .31; .03; .02 INJECTION, SOLUTION INTRAVENOUS at 12:23

## 2024-03-28 RX ADMIN — FENTANYL CITRATE 25 MCG: 50 INJECTION INTRAMUSCULAR; INTRAVENOUS at 13:49

## 2024-03-28 RX ADMIN — FENTANYL CITRATE 25 MCG: 50 INJECTION INTRAMUSCULAR; INTRAVENOUS at 12:03

## 2024-03-28 RX ADMIN — FENTANYL CITRATE 25 MCG: 50 INJECTION INTRAMUSCULAR; INTRAVENOUS at 11:45

## 2024-03-28 RX ADMIN — Medication 100 MCG: at 11:40

## 2024-03-28 RX ADMIN — CEFAZOLIN SODIUM 2000 MG: 2 SOLUTION INTRAVENOUS at 11:26

## 2024-03-28 RX ADMIN — ONDANSETRON 4 MG: 2 INJECTION INTRAMUSCULAR; INTRAVENOUS at 11:41

## 2024-03-28 RX ADMIN — DEXAMETHASONE SODIUM PHOSPHATE 10 MG: 10 INJECTION INTRAMUSCULAR; INTRAVENOUS at 11:41

## 2024-03-28 RX ADMIN — FENTANYL CITRATE 25 MCG: 50 INJECTION INTRAMUSCULAR; INTRAVENOUS at 13:13

## 2024-03-28 RX ADMIN — FENTANYL CITRATE 25 MCG: 50 INJECTION INTRAMUSCULAR; INTRAVENOUS at 13:31

## 2024-03-28 RX ADMIN — SODIUM CHLORIDE, SODIUM LACTATE, POTASSIUM CHLORIDE, AND CALCIUM CHLORIDE: .6; .31; .03; .02 INJECTION, SOLUTION INTRAVENOUS at 11:02

## 2024-03-28 RX ADMIN — PROPOFOL 150 MG: 10 INJECTION, EMULSION INTRAVENOUS at 11:29

## 2024-03-28 RX ADMIN — FENTANYL CITRATE 25 MCG: 50 INJECTION INTRAMUSCULAR; INTRAVENOUS at 11:33

## 2024-03-28 RX ADMIN — SODIUM CHLORIDE, SODIUM LACTATE, POTASSIUM CHLORIDE, AND CALCIUM CHLORIDE 50 ML/HR: .6; .31; .03; .02 INJECTION, SOLUTION INTRAVENOUS at 16:58

## 2024-03-28 RX ADMIN — Medication 100 MCG: at 11:47

## 2024-03-28 RX ADMIN — FENTANYL CITRATE 25 MCG: 50 INJECTION INTRAMUSCULAR; INTRAVENOUS at 12:17

## 2024-03-28 RX ADMIN — FENTANYL CITRATE 25 MCG: 50 INJECTION INTRAMUSCULAR; INTRAVENOUS at 14:26

## 2024-03-28 RX ADMIN — PROPOFOL 50 MG: 10 INJECTION, EMULSION INTRAVENOUS at 11:33

## 2024-03-28 NOTE — INTERVAL H&P NOTE
H&P reviewed. After examining the patient I find no changes in the patients condition since the H&P had been written.    Vitals:    03/28/24 0911   BP: 156/74   Pulse: 75   Resp: 16   Temp: 97.8 °F (36.6 °C)

## 2024-03-28 NOTE — PROGRESS NOTES
"General Surgery  Progress Note   Eleni Chew 83 y.o. female MRN: [id number]  Unit/Bed#: S -01 Encounter: [id number]    Assessment:  84 yo male with DCIS of right breast, s/p right breast mastectomy, SLN bx 3/28.     AVSS on room air  DINA drain x2: 160 cc serosanguineous    Plan:  -Diet as tolerated  -Monitor drain output  -Pain/ nausea control PRN  -OOB/ ambulation  -Incentive Spirometry  -DC home       Subjective/Objective     Subjective:   Patient seen and examined at bedside, in no acute distress. No acute events overnight.  Patient denies any pain.  Patient is tolerating diet.  Patient is ambulating.  No nausea vomiting fevers chills chest pain or shortness of breath.    Objective:   Vitals:Blood pressure 152/74, pulse 73, temperature 98.2 °F (36.8 °C), resp. rate 16, height 5' 6\" (1.676 m), weight 81.6 kg (180 lb), SpO2 94%.  Temp (24hrs), Av.8 °F (36.6 °C), Min:97 °F (36.1 °C), Max:98.2 °F (36.8 °C)      I/O          0701   0700  0701   0700  0701   0700    I.V. (mL/kg)   1350 (16.5)    NG/GT   20    Total Intake(mL/kg)   1370 (16.8)    Net   +1370           Unmeasured Urine Occurrence   1 x            Invasive Devices       Drain  Duration             Closed/Suction Drain Left Breast 19 Fr. 1732 days    Closed/Suction Drain Left Breast Bulb 19 Fr. 1732 days    Closed/Suction Drain Right;Inferior Breast Bulb 19 Fr. 1 day    Closed/Suction Drain Right;Superior Breast Bulb 19 Fr. 1 day              Airway  Duration             Supraglottic Airway LMA 3 1 day                    Physical Exam:  General: No acute distress  Neuro: Awake, Alert and Oriented x 3  HEENT:  Normocephalic, atraumatic, moist mucous membranes  CV: Regular rate and rhythm  Lungs: Normal work of breathing, no increased respiratory effort  Chest: DINA x2 with serosanguinous output, chencho-incisional tenderness, no fluctuance or crepitance, incision clean dry intact   Abdomen: Soft, non-tender, " non-distended.   Extremities: No edema, clubbing or cyanosis  Skin: Warm, dry    Lab Results   Component Value Date    WBC 6.74 03/20/2024    HGB 12.0 03/20/2024    HCT 39.3 03/20/2024    MCV 91 03/20/2024     03/20/2024      Lab Results   Component Value Date    SODIUM 139 03/20/2024    K 4.3 03/20/2024     03/20/2024    CO2 30 03/20/2024    AGAP 6 03/20/2024    BUN 16 03/20/2024    CREATININE 0.88 03/20/2024    GLUC 97 03/20/2024    GLUF 104 (H) 05/17/2023    CALCIUM 9.6 03/20/2024    AST 15 03/20/2024    ALT 9 03/20/2024    ALKPHOS 110 (H) 03/20/2024    TP 7.4 03/20/2024    TBILI 1.14 (H) 03/20/2024    EGFR 61 03/20/2024       VTE Prophylaxis: Sequential compression device (Venodyne)         Arjun Jackson MD  3/29/2024  12:55 PM

## 2024-03-28 NOTE — PLAN OF CARE
Problem: PAIN - ADULT  Goal: Verbalizes/displays adequate comfort level or baseline comfort level  Description: Interventions:  - Encourage patient to monitor pain and request assistance  - Assess pain using appropriate pain scale  - Administer analgesics based on type and severity of pain and evaluate response  - Implement non-pharmacological measures as appropriate and evaluate response  - Consider cultural and social influences on pain and pain management  - Notify physician/advanced practitioner if interventions unsuccessful or patient reports new pain  Outcome: Progressing     Problem: INFECTION - ADULT  Goal: Absence or prevention of progression during hospitalization  Description: INTERVENTIONS:  - Assess and monitor for signs and symptoms of infection  - Monitor lab/diagnostic results  - Monitor all insertion sites, i.e. indwelling lines, tubes, and drains  - Monitor endotracheal if appropriate and nasal secretions for changes in amount and color  - [place] appropriate cooling/warming therapies per order  - Administer medications as ordered  - Instruct and encourage patient and family to use good hand hygiene technique  - Identify and instruct in appropriate isolation precautions for identified infection/condition  Outcome: Progressing  Goal: Absence of fever/infection during neutropenic period  Description: INTERVENTIONS:  - Monitor WBC    Outcome: Progressing     Problem: SAFETY ADULT  Goal: Patient will remain free of falls  Description: INTERVENTIONS:  - Educate patient/family on patient safety including physical limitations  - Instruct patient to call for assistance with activity   - Consult OT/PT to assist with strengthening/mobility   - Keep Call bell within reach  - Keep bed low and locked with side rails adjusted as appropriate  - Keep care items and personal belongings within reach  - Initiate and maintain comfort rounds  - Make Fall Risk Sign visible to staff  - Offer Toileting every 2 Hours,  in advance of need  - Initiate/Maintain alarm  - Obtain necessary fall risk management equipment  - Apply yellow socks and bracelet for high fall risk patients  - Consider moving patient to room near nurses station  Outcome: Progressing  Goal: Maintain or return to baseline ADL function  Description: INTERVENTIONS:  -  Assess patient's ability to carry out ADLs; assess patient's baseline for ADL function and identify physical deficits which impact ability to perform ADLs (bathing, care of mouth/teeth, toileting, grooming, dressing, etc.)  - Assess/evaluate cause of self-care deficits   - Assess range of motion  - Assess patient's mobility; develop plan if impaired  - Assess patient's need for assistive devices and provide as appropriate  - Encourage maximum independence but intervene and supervise when necessary  - Involve family in performance of ADLs  - Assess for home care needs following discharge   - Consider OT consult to assist with ADL evaluation and planning for discharge  - Provide patient education as appropriate  Outcome: Progressing  Goal: Maintains/Returns to pre admission functional level  Description: INTERVENTIONS:  - Perform AM-PAC 6 Click Basic Mobility/ Daily Activity assessment daily.  - Set and communicate daily mobility goal to care team and patient/family/caregiver.   - Collaborate with rehabilitation services on mobility goals if consulted  - Perform Range of Motion 3 times a day.  - Reposition patient every 2 hours.  - Dangle patient 3 times a day  - Stand patient 3 times a day  - Ambulate patient 3 times a day  - Out of bed to chair 3 times a day   - Out of bed for meals 3 times a day  - Out of bed for toileting  - Record patient progress and toleration of activity level   Outcome: Progressing     Problem: DISCHARGE PLANNING  Goal: Discharge to home or other facility with appropriate resources  Description: INTERVENTIONS:  - Identify barriers to discharge w/patient and caregiver  - Arrange  for needed discharge resources and transportation as appropriate  - Identify discharge learning needs (meds, wound care, etc.)  - Arrange for interpretive services to assist at discharge as needed  - Refer to Case Management Department for coordinating discharge planning if the patient needs post-hospital services based on physician/advanced practitioner order or complex needs related to functional status, cognitive ability, or social support system  Outcome: Progressing

## 2024-03-28 NOTE — ANESTHESIA PREPROCEDURE EVALUATION
Procedure:  BREAST MASTECTOMY (Right: Breast)  LYMPHATIC MAPPING WITH BLUE AND RADIOACTIVE DYES, SENTINEL LYMPH NODE BIOPSY (INJECT AT 1000 BY DR HARDING IN THE OR) (Right: Axilla)  POSSIBLE AXILLARY DISSECTION (Right: Axilla)    Relevant Problems   CARDIO   (+) Essential hypertension   (+) Essential hypertension, benign   (+) Hypertension   (+) Mixed hyperlipidemia      GI/HEPATIC   (+) GERD (gastroesophageal reflux disease)   (+) Gastroesophageal reflux disease without esophagitis   (+) Hiatal hernia      /RENAL   (+) Stage 3a chronic kidney disease (HCC)      MUSCULOSKELETAL   (+) Hiatal hernia   (+) Low back pain due to bilateral sciatica   (+) Osteoarthritis of spine with radiculopathy, lumbar region   (+) Primary osteoarthritis of right knee      PULMONARY   (+) Asthma   (+) SOB (shortness of breath)        Physical Exam    Airway    Mallampati score: II         Dental   No notable dental hx     Cardiovascular      Pulmonary      Other Findings  post-pubertal.      Anesthesia Plan  ASA Score- 3     Anesthesia Type- general with ASA Monitors.         Additional Monitors:     Airway Plan: LMA.    Comment: I, Dr. Knight, the attending physician, have personally seen and evaluated the patient prior to anesthetic care.  I have reviewed the pre-anesthetic record, and other medical records if appropriate to the anesthetic care.  If a CRNA is involved in the case, I have reviewed the CRNA assessment, if present, and agree.  The patient is in a suitable condition to proceed with my formulated anesthetic plan.  .       Plan Factors-    Chart reviewed.                      Induction- intravenous.    Postoperative Plan-     Informed Consent- Anesthetic plan and risks discussed with patient.  I personally reviewed this patient with the CRNA. Discussed and agreed on the Anesthesia Plan with the CRNA..

## 2024-03-28 NOTE — OP NOTE
OPERATIVE REPORT  PATIENT NAME: Eleni Chew    :  1940  MRN: [id number]  Pt Location: AN OR ROOM 05    SURGERY DATE: 3/28/2024    Surgeons and Role:     * Ismael Thomas MD - Primary     * Arjun Jackson MD - Assisting    Preop Diagnosis:  Ductal carcinoma in situ (DCIS) of right breast [D05.11]    Post-Op Diagnosis Codes:     * Ductal carcinoma in situ (DCIS) of right breast [D05.11]    Procedure(s):  Right - BREAST MASTECTOMY  Right - LYMPHATIC MAPPING WITH BLUE AND RADIOACTIVE DYES. SENTINEL LYMPH NODE BIOPSY (INJECT AT 1000 BY DR THOMAS IN THE OR)  Right - POSSIBLE AXILLARY DISSECTION  Right breast pectoral P1 and P2 nerve block  Specimen(s):  ID Type Source Tests Collected by Time Destination   1 : Right axillary sentinel lymph node Tissue Lymph Node, [place] TISSUE EXAM Ismael Thomas MD 3/28/2024 1209    2 : right breast mastectomy Tissue Breast, Right TISSUE EXAM Ismael Thomas MD 3/28/2024 1231        Estimated Blood Loss:   Minimal    Drains:  Closed/Suction Drain Left Breast Bulb 19 Fr. (Active)   Number of days: 1731       Closed/Suction Drain Left Breast 19 Fr. (Active)   Number of days: 1731       Closed/Suction Drain Right Breast Bulb 19 Fr. (Active)   Number of days: 0       Closed/Suction Drain Right Breast Bulb 19 Fr. (Active)   Number of days: 0       Anesthesia Type:   General    Operative Indications:  Ductal carcinoma in situ (DCIS) of right breast [D05.11]      Operative Findings:  Good specimen radiograph, sln, blue, hot and grossly normal, negative on touch prep    Complications:   None    Procedure and Technique:  The patient was brought to the operation room and placed under general anesthesia.  Attention was paid to ensure appropriate padding and correct positioning.  The right breast was injected intradermally with 0.3cc of methylene blue followed by technetium and then prepped and draped in a sterile fashion.  I initiated a time-out, identifying the patient, the correct side and  the above procedure.  All parties agreed with the time out.     The planned incision was then injected with 0.25% Marcaine and epinephrine for local anesthesia.  The incision was sharply incised.  Thin flaps were then elevated using electrocautery starting superiorly and then continuing medially, inferiorly and finally laterally.  The tail of Brian was then dissected away from the axilla proper leaving the breast tethered to the underlying musculature.      The sentinel lymph node was identified and removed.  The node was blue and radioactive.  There were no other radioactive, blue or enlarged lymph nodes in the axilla. The sentinel lymph node was sent to pathology and there was no microscopic evidence of metastasis.     The breast was then removed from the muscles in a sub-facial plane.  The breast was oriented with sutures (short=superior; medium=medial; long=lateral). The breast was sent to pathology in formalin for permanent pathologic evaluation. Meticulous hemostasis was maintained with electrocautery.  The wound was extensively irrigated and two 19mm, slotted, round DINA drains were placed and brought out through separate incisions and secured with nylon sutures.  The wound was then closed in two layers - an inner layer of 2-0 vicryl and a subcuticular closure with 4-0 Monocryl.  Exofin was applied.  The counts were correct x2.    I was present for the entire procedure.    Patient Disposition:  PACU     [place] Node Biopsy for Breast Cancer - Right  Operation performed with curative intent. Yes   Tracer(s) used to identify sentinel nodes in the upfront surgery (non-neoadjuvant) setting (select all that apply). Dye and Radioactive tracer   Tracer(s) used to identify sentinel nodes in the neoadjuvant setting (select all that apply). N/A   All nodes (colored or non-colored) present at the end of a dye-filled lymphatic channel were removed. Yes   All significantly radioactive nodes were removed. Yes   All  palpably suspicious nodes were removed. Yes   Biopsy-proven positive nodes marked with clips prior to chemotherapy were identified and removed. N/A            SIGNATURE: Ismael Thomas MD  DATE: March 28, 2024  TIME: 1:04 PM

## 2024-03-28 NOTE — QUICK NOTE
"Post Op Check:    Patient seen and examined at bedside, in no acute distress. Patient reports pain in right breast as 2-3/10 in severity. Patient is tolerating her diet. Patient is ambulating. No nausea, vomiting, fevers, chills, chest pain or sob.      Vitals:Blood pressure 162/86, pulse 67, temperature 98 °F (36.7 °C), resp. rate 16, height 5' 6\" (1.676 m), weight 81.6 kg (180 lb), SpO2 95%.  Temp (24hrs), Av.7 °F (36.5 °C), Min:97 °F (36.1 °C), Max:98 °F (36.7 °C)      General: NAD  HENT: NCAT MMM  Neck: supple, no JVD  CV: nl rate  Lungs: nl wob. No resp distress  Chest: DINA x2 with serosanguinous output, chencho-incisional tenderness, no fluctuance or crepitance  ABD: Soft, nontender, nondistended  Extrem: No CCE  Neuro: AAOx3       I/O          0701   0700  0701   0700  0701   0700    I.V. (mL/kg)   1350 (16.5)    NG/GT   20    Total Intake(mL/kg)   1370 (16.8)    Net   +1370           Unmeasured Urine Occurrence   1 x            Invasive Devices       Peripheral Intravenous Line  Duration             Peripheral IV 18 Left Wrist 2202 days              Drain  Duration             Closed/Suction Drain Left Breast 19 Fr. 1731 days    Closed/Suction Drain Left Breast Bulb 19 Fr. 1731 days    Closed/Suction Drain Right;Inferior Breast Bulb 19 Fr. <1 day    Closed/Suction Drain Right;Superior Breast Bulb 19 Fr. <1 day              Airway  Duration             Supraglottic Airway LMA 3 <1 day                      Plan:  Diet Regular; Regular House  Continue to monitor  Pain and nausea control PRN  Monitor drain output  Likely DC tomorrow    Arjun Jackson MD  3/28/2024 6:06 PM   "

## 2024-03-28 NOTE — ANESTHESIA POSTPROCEDURE EVALUATION
Post-Op Assessment Note    CV Status:  Stable  Pain Score: 0    Pain management: adequate       Mental Status:  Awake and alert   Hydration Status:  Stable   PONV Controlled:  None   Airway Patency:  Patent and adequate     Post Op Vitals Reviewed: Yes    No anethesia notable event occurred.    Staff: CRNA, Anesthesiologist               BP   167/77   Temp   97   Pulse  62   Resp   16   SpO2   100%

## 2024-03-29 VITALS
HEART RATE: 73 BPM | HEIGHT: 66 IN | BODY MASS INDEX: 28.93 KG/M2 | OXYGEN SATURATION: 94 % | WEIGHT: 180 LBS | RESPIRATION RATE: 16 BRPM | DIASTOLIC BLOOD PRESSURE: 74 MMHG | TEMPERATURE: 98.2 F | SYSTOLIC BLOOD PRESSURE: 152 MMHG

## 2024-03-29 PROCEDURE — 99024 POSTOP FOLLOW-UP VISIT: CPT | Performed by: SURGERY

## 2024-03-29 RX ORDER — ACETAMINOPHEN 325 MG/1
650 TABLET ORAL EVERY 4 HOURS PRN
COMMUNITY
Start: 2024-03-29

## 2024-03-29 RX ADMIN — AMLODIPINE BESYLATE 5 MG: 5 TABLET ORAL at 09:18

## 2024-03-29 RX ADMIN — SODIUM CHLORIDE, SODIUM LACTATE, POTASSIUM CHLORIDE, AND CALCIUM CHLORIDE 50 ML/HR: .6; .31; .03; .02 INJECTION, SOLUTION INTRAVENOUS at 06:13

## 2024-03-29 NOTE — PLAN OF CARE
Problem: PAIN - ADULT  Goal: Verbalizes/displays adequate comfort level or baseline comfort level  Description: Interventions:  - Encourage patient to monitor pain and request assistance  - Assess pain using appropriate pain scale  - Administer analgesics based on type and severity of pain and evaluate response  - Implement non-pharmacological measures as appropriate and evaluate response  - Consider cultural and social influences on pain and pain management  - Notify physician/advanced practitioner if interventions unsuccessful or patient reports new pain  Outcome: Progressing     Problem: INFECTION - ADULT  Goal: Absence or prevention of progression during hospitalization  Description: INTERVENTIONS:  - Assess and monitor for signs and symptoms of infection  - Monitor lab/diagnostic results  - Monitor all insertion sites, i.e. indwelling lines, tubes, and drains  - Monitor endotracheal if appropriate and nasal secretions for changes in amount and color  - [place] appropriate cooling/warming therapies per order  - Administer medications as ordered  - Instruct and encourage patient and family to use good hand hygiene technique  - Identify and instruct in appropriate isolation precautions for identified infection/condition  Outcome: Progressing  Goal: Absence of fever/infection during neutropenic period  Description: INTERVENTIONS:  - Monitor WBC    Outcome: Progressing     Problem: SAFETY ADULT  Goal: Patient will remain free of falls  Description: INTERVENTIONS:  - Educate patient/family on patient safety including physical limitations  - Instruct patient to call for assistance with activity   - Consult OT/PT to assist with strengthening/mobility   - Keep Call bell within reach  - Keep bed low and locked with side rails adjusted as appropriate  - Keep care items and personal belongings within reach  - Initiate and maintain comfort rounds  - Make Fall Risk Sign visible to staff  - Offer Toileting every  Hours,  in advance of need  - Initiate/Maintain alarm  - Obtain necessary fall risk management equipment:   - Apply yellow socks and bracelet for high fall risk patients  - Consider moving patient to room near nurses station  Outcome: Progressing  Goal: Maintain or return to baseline ADL function  Description: INTERVENTIONS:  -  Assess patient's ability to carry out ADLs; assess patient's baseline for ADL function and identify physical deficits which impact ability to perform ADLs (bathing, care of mouth/teeth, toileting, grooming, dressing, etc.)  - Assess/evaluate cause of self-care deficits   - Assess range of motion  - Assess patient's mobility; develop plan if impaired  - Assess patient's need for assistive devices and provide as appropriate  - Encourage maximum independence but intervene and supervise when necessary  - Involve family in performance of ADLs  - Assess for home care needs following discharge   - Consider OT consult to assist with ADL evaluation and planning for discharge  - Provide patient education as appropriate  Outcome: Progressing  Goal: Maintains/Returns to pre admission functional level  Description: INTERVENTIONS:  - Perform AM-PAC 6 Click Basic Mobility/ Daily Activity assessment daily.  - Set and communicate daily mobility goal to care team and patient/family/caregiver.   - Collaborate with rehabilitation services on mobility goals if consulted  - Perform Range of Motion  times a day.  - Reposition patient every  hours.  - Dangle patient  times a day  - Stand patient  times a day  - Ambulate patient  times a day  - Out of bed to chair  times a day   - Out of bed for meals times a day  - Out of bed for toileting  - Record patient progress and toleration of activity level   Outcome: Progressing     Problem: DISCHARGE PLANNING  Goal: Discharge to home or other facility with appropriate resources  Description: INTERVENTIONS:  - Identify barriers to discharge w/patient and caregiver  - Arrange for  needed discharge resources and transportation as appropriate  - Identify discharge learning needs (meds, wound care, etc.)  - Arrange for interpretive services to assist at discharge as needed  - Refer to Case Management Department for coordinating discharge planning if the patient needs post-hospital services based on physician/advanced practitioner order or complex needs related to functional status, cognitive ability, or social support system  Outcome: Progressing     Problem: Knowledge Deficit  Goal: Patient/family/caregiver demonstrates understanding of disease process, treatment plan, medications, and discharge instructions  Description: Complete learning assessment and assess knowledge base.  Interventions:  - Provide teaching at level of understanding  - Provide teaching via preferred learning methods  Outcome: Progressing

## 2024-03-29 NOTE — DISCHARGE INSTR - AVS FIRST PAGE
POST-OPERATIVE BREAST CARE INSTRUCTIONS    Wound Closure/Dressing:            Your wound is closed with:  Steri strips-white pieces of tape that hold your incision together along with surgical glue          Dissolvable sutures-underneath the skin    Wound care:    If you have gauze over your wound you may remove it the day after surgery. Leave the Steri-strips on, they will fall off on their own in 1-2 weeks  You may shower using soap and water to clean your wound. Gently pat dry.                  Drain Care:  If you do not have a drain, disregard this section.    Visiting Nursing should have been arranged upon discharge from hospital.  A nurse will call your home to schedule an initial visit.  Please call the number below if you have not received a call within 48 hours of hospital discharge.   You may shower with the DINA drains.  Wash area around the drains with soap and water and pat dry.  Record drain outputs on chart given to you at pre op visit and bring that chart to office at post op appt  DINA drains can be removed in the office by a nurse either at post op visit OR when drain output is 30 cc’s or less in a 24 hour period for three days in a row.  If you had plastic surgery or reconstructive surgery, the plastic surgeon will manage the drains.    Other:      You can begin wearing your own bra when it feels comfortable.    You may resume using deodorant the day after surgery                 Post-op visit:  your post-op visit is scheduled for:  4/15/24 2:45pm    Call our office at [phone] if you have any  of the following:    Redness, swelling, heat, unusual drainage or heavy bleeding from wound.  Fever greater than 101.°  Pain not relieved by medication.

## 2024-03-29 NOTE — PLAN OF CARE
Problem: PAIN - ADULT  Goal: Verbalizes/displays adequate comfort level or baseline comfort level  Description: Interventions:  - Encourage patient to monitor pain and request assistance  - Assess pain using appropriate pain scale  - Administer analgesics based on type and severity of pain and evaluate response  - Implement non-pharmacological measures as appropriate and evaluate response  - Consider cultural and social influences on pain and pain management  - Notify physician/advanced practitioner if interventions unsuccessful or patient reports new pain  3/29/2024 1236 by Coreen Belcher RN  Outcome: Adequate for Discharge  3/29/2024 1009 by Coreen Belcher RN  Outcome: Progressing     Problem: INFECTION - ADULT  Goal: Absence or prevention of progression during hospitalization  Description: INTERVENTIONS:  - Assess and monitor for signs and symptoms of infection  - Monitor lab/diagnostic results  - Monitor all insertion sites, i.e. indwelling lines, tubes, and drains  - Monitor endotracheal if appropriate and nasal secretions for changes in amount and color  - [place] appropriate cooling/warming therapies per order  - Administer medications as ordered  - Instruct and encourage patient and family to use good hand hygiene technique  - Identify and instruct in appropriate isolation precautions for identified infection/condition  3/29/2024 1236 by Coreen Belcher RN  Outcome: Adequate for Discharge  3/29/2024 1009 by Coreen Belcher RN  Outcome: Progressing  Goal: Absence of fever/infection during neutropenic period  Description: INTERVENTIONS:  - Monitor WBC    3/29/2024 1236 by Coreen Belcher RN  Outcome: Adequate for Discharge  3/29/2024 1009 by Coreen Belcher RN  Outcome: Progressing     Problem: SAFETY ADULT  Goal: Patient will remain free of falls  Description: INTERVENTIONS:  - Educate patient/family on patient safety including physical limitations  - Instruct patient to  call for assistance with activity   - Consult OT/PT to assist with strengthening/mobility   - Keep Call bell within reach  - Keep bed low and locked with side rails adjusted as appropriate  - Keep care items and personal belongings within reach  - Initiate and maintain comfort rounds  - Make Fall Risk Sign visible to staff  - Offer Toileting every  Hours, in advance of need  - Initiate/Maintain alarm  - Obtain necessary fall risk management equipment:   - Apply yellow socks and bracelet for high fall risk patients  - Consider moving patient to room near nurses station  3/29/2024 1236 by Coreen Belcher RN  Outcome: Adequate for Discharge  3/29/2024 1009 by Coreen Belcher RN  Outcome: Progressing  Goal: Maintain or return to baseline ADL function  Description: INTERVENTIONS:  -  Assess patient's ability to carry out ADLs; assess patient's baseline for ADL function and identify physical deficits which impact ability to perform ADLs (bathing, care of mouth/teeth, toileting, grooming, dressing, etc.)  - Assess/evaluate cause of self-care deficits   - Assess range of motion  - Assess patient's mobility; develop plan if impaired  - Assess patient's need for assistive devices and provide as appropriate  - Encourage maximum independence but intervene and supervise when necessary  - Involve family in performance of ADLs  - Assess for home care needs following discharge   - Consider OT consult to assist with ADL evaluation and planning for discharge  - Provide patient education as appropriate  3/29/2024 1236 by Coreen Belcher RN  Outcome: Adequate for Discharge  3/29/2024 1009 by Coreen Belcher RN  Outcome: Progressing  Goal: Maintains/Returns to pre admission functional level  Description: INTERVENTIONS:  - Perform AM-PAC 6 Click Basic Mobility/ Daily Activity assessment daily.  - Set and communicate daily mobility goal to care team and patient/family/caregiver.   - Collaborate with rehabilitation  services on mobility goals if consulted  - Perform Range of Motion  times a day.  - Reposition patient every  hours.  - Dangle patient  times a day  - Stand patient  times a day  - Ambulate patient  times a day  - Out of bed to chair  times a day   - Out of bed for meals times a day  - Out of bed for toileting  - Record patient progress and toleration of activity level   3/29/2024 1236 by Coreen Belcher RN  Outcome: Adequate for Discharge  3/29/2024 1009 by Coreen Belcher RN  Outcome: Progressing     Problem: DISCHARGE PLANNING  Goal: Discharge to home or other facility with appropriate resources  Description: INTERVENTIONS:  - Identify barriers to discharge w/patient and caregiver  - Arrange for needed discharge resources and transportation as appropriate  - Identify discharge learning needs (meds, wound care, etc.)  - Arrange for interpretive services to assist at discharge as needed  - Refer to Case Management Department for coordinating discharge planning if the patient needs post-hospital services based on physician/advanced practitioner order or complex needs related to functional status, cognitive ability, or social support system  3/29/2024 1236 by Coreen Belcher RN  Outcome: Adequate for Discharge  3/29/2024 1009 by Coreen Belcher RN  Outcome: Progressing     Problem: Knowledge Deficit  Goal: Patient/family/caregiver demonstrates understanding of disease process, treatment plan, medications, and discharge instructions  Description: Complete learning assessment and assess knowledge base.  Interventions:  - Provide teaching at level of understanding  - Provide teaching via preferred learning methods  3/29/2024 1236 by Coreen Belcher RN  Outcome: Adequate for Discharge  3/29/2024 1009 by Coreen Belcher RN  Outcome: Progressing

## 2024-03-29 NOTE — CASE MANAGEMENT
Case Management Discharge Planning Note    Patient name Eleni Chew  [facility] S /S -01 MRN [id number]  : 1940 Date 3/29/2024       Current Admission Date: 3/28/2024  Current Admission Diagnosis:History of ductal carcinoma in situ (DCIS) of breast   Patient Active Problem List    Diagnosis Date Noted    Ductal carcinoma in situ (DCIS) of right breast 2024    Primary osteoarthritis of right knee 2023    Senile osteoporosis 07/15/2022    Pain in right elbow 06/10/2022    Stage 3a chronic kidney disease (HCC) 06/10/2022    SOB (shortness of breath) 2022    Osteoarthritis of spine with radiculopathy, lumbar region 2021    Essential hypertension 2021    Gastroesophageal reflux disease without esophagitis 2021    Dry skin 2021    Seasonal allergies 2021    Mixed hyperlipidemia 2021    Essential hypertension, benign 2021    Body mass index 30.0-30.9, adult 10/27/2020    Low back pain due to bilateral sciatica 10/27/2020    Asthma     History of ductal carcinoma in situ (DCIS) of breast 2019    Hiatal hernia 2018    Hypertension 2018    GERD (gastroesophageal reflux disease) 2018    Esophagitis 2018      LOS (days): 0  Geometric Mean LOS (GMLOS) (days):   Days to GMLOS:     OBJECTIVE:            Current admission status: Outpatient Surgery   Preferred Pharmacy:   [id number] - [place], [place] - [address]  [place] [address]  Phone: [phone] Fax: [phone]    Primary Care Provider: Judson Grant MD    Primary Insurance: MEDICARE  Secondary Insurance: [facility]    DISCHARGE DETAILS:    Discharge planning discussed with:: Patient  Freedom of Choice: Yes  Comments - Freedom of Choice: Reviewed DCP and HHC  CM contacted family/caregiver?: No- see comments  Were Treatment Team discharge recommendations reviewed with patient/caregiver?: Yes  Did  patient/caregiver verbalize understanding of patient care needs?: Yes  Were patient/caregiver advised of the risks associated with not following Treatment Team discharge recommendations?: Yes    Contacts  Patient Contacts: Patient  Relationship to Patient:: Other (Comment)  Contact Method: In Person  Reason/Outcome: Discharge Planning, Referral, Continuity of Care    Requested Home Health Care         Is the patient interested in [facility] at discharge?: Yes  Home Health Discipline requested:: Nursing  Home Health Agency Name:: [facility]  Home Health Follow-Up Provider:: Referring Provider  Home Health Services Needed:: Evaluate Functional Status and Safety, Other (comment) (Drain care/management)  Homebound Criteria Met:: Requires the Assistance of Another Person for Safe Ambulation or to Leave the Home  Supporting Clincal Findings:: Limited Endurance, Fatigues [place] in Short Distances    DME Referral Provided  Referral made for DME?: No    Other Referral/Resources/Interventions Provided:  Interventions: [facility]         Treatment Team Recommendation: Home with Home Health Care  Discharge Destination Plan:: Home with Home Health Care  Transport at Discharge : Family                                         CM notified by surgery that patient is medically stable for DC home today.    CM spoke with [facility] admissions and confirmed that patient is scheduled to have a nurse visit tomorrow with them.    Patient's family will provide transport home.    CM reviewed the availability of treatment team to discuss questions or concerns patient and/or family may have regarding  understanding medications and recognizing signs and symptoms once discharged.  CM also encouraged patient to follow up with all recommended appointments after discharge. Patient advised of importance for patient and family to participate in managing patient's medical well being.

## 2024-03-29 NOTE — PLAN OF CARE
Problem: PAIN - ADULT  Goal: Verbalizes/displays adequate comfort level or baseline comfort level  Description: Interventions:  - Encourage patient to monitor pain and request assistance  - Assess pain using appropriate pain scale  - Administer analgesics based on type and severity of pain and evaluate response  - Implement non-pharmacological measures as appropriate and evaluate response  - Consider cultural and social influences on pain and pain management  - Notify physician/advanced practitioner if interventions unsuccessful or patient reports new pain  Outcome: Progressing     Problem: INFECTION - ADULT  Goal: Absence or prevention of progression during hospitalization  Description: INTERVENTIONS:  - Assess and monitor for signs and symptoms of infection  - Monitor lab/diagnostic results  - Monitor all insertion sites, i.e. indwelling lines, tubes, and drains  - Monitor endotracheal if appropriate and nasal secretions for changes in amount and color  - [place] appropriate cooling/warming therapies per order  - Administer medications as ordered  - Instruct and encourage patient and family to use good hand hygiene technique  - Identify and instruct in appropriate isolation precautions for identified infection/condition  Outcome: Progressing  Goal: Absence of fever/infection during neutropenic period  Description: INTERVENTIONS:  - Monitor WBC    Outcome: Progressing     Problem: SAFETY ADULT  Goal: Patient will remain free of falls  Description: INTERVENTIONS:  - Educate patient/family on patient safety including physical limitations  - Instruct patient to call for assistance with activity   - Consult OT/PT to assist with strengthening/mobility   - Keep Call bell within reach  - Keep bed low and locked with side rails adjusted as appropriate  - Keep care items and personal belongings within reach  - Initiate and maintain comfort rounds  - Make Fall Risk Sign visible to staff  - Offer Toileting every 2 Hours,  in advance of need  - Initiate/Maintain bed alarm  - Obtain necessary fall risk management equipment  - Apply yellow socks and bracelet for high fall risk patients  - Consider moving patient to room near nurses station  Outcome: Progressing  Goal: Maintain or return to baseline ADL function  Description: INTERVENTIONS:  -  Assess patient's ability to carry out ADLs; assess patient's baseline for ADL function and identify physical deficits which impact ability to perform ADLs (bathing, care of mouth/teeth, toileting, grooming, dressing, etc.)  - Assess/evaluate cause of self-care deficits   - Assess range of motion  - Assess patient's mobility; develop plan if impaired  - Assess patient's need for assistive devices and provide as appropriate  - Encourage maximum independence but intervene and supervise when necessary  - Involve family in performance of ADLs  - Assess for home care needs following discharge   - Consider OT consult to assist with ADL evaluation and planning for discharge  - Provide patient education as appropriate  Outcome: Progressing  Goal: Maintains/Returns to pre admission functional level  Description: INTERVENTIONS:  - Perform AM-PAC 6 Click Basic Mobility/ Daily Activity assessment daily.  - Set and communicate daily mobility goal to care team and patient/family/caregiver.   - Collaborate with rehabilitation services on mobility goals if consulted  - Perform Range of Motion 2 times a day.  - Reposition patient every 2 hours.  - Dangle patient 2 times a day  - Stand patient 2 times a day  - Ambulate patient 3 times a day  - Out of bed to chair 3 times a day   - Out of bed for meals 3 times a day  - Out of bed for toileting  - Record patient progress and toleration of activity level   Outcome: Progressing     Problem: DISCHARGE PLANNING  Goal: Discharge to home or other facility with appropriate resources  Description: INTERVENTIONS:  - Identify barriers to discharge w/patient and caregiver  -  Arrange for needed discharge resources and transportation as appropriate  - Identify discharge learning needs (meds, wound care, etc.)  - Arrange for interpretive services to assist at discharge as needed  - Refer to Case Management Department for coordinating discharge planning if the patient needs post-hospital services based on physician/advanced practitioner order or complex needs related to functional status, cognitive ability, or social support system  Outcome: Progressing     Problem: Knowledge Deficit  Goal: Patient/family/caregiver demonstrates understanding of disease process, treatment plan, medications, and discharge instructions  Description: Complete learning assessment and assess knowledge base.  Interventions:  - Provide teaching at level of understanding  - Provide teaching via preferred learning methods  Outcome: Progressing

## 2024-03-30 ENCOUNTER — HOME CARE VISIT (OUTPATIENT)
Dept: HOME HEALTH SERVICES | Facility: HOME HEALTHCARE | Age: 84
End: 2024-03-30
Payer: MEDICARE

## 2024-03-30 VITALS
BODY MASS INDEX: 28.93 KG/M2 | DIASTOLIC BLOOD PRESSURE: 76 MMHG | RESPIRATION RATE: 20 BRPM | HEIGHT: 66 IN | OXYGEN SATURATION: 97 % | HEART RATE: 62 BPM | TEMPERATURE: 98 F | WEIGHT: 180 LBS | SYSTOLIC BLOOD PRESSURE: 152 MMHG

## 2024-03-30 PROCEDURE — 400013 VN SOC

## 2024-03-30 PROCEDURE — 10330081 VN NO-PAY CLAIM PROCEDURE

## 2024-03-30 PROCEDURE — G0299 HHS/HOSPICE OF RN EA 15 MIN: HCPCS

## 2024-04-01 NOTE — CASE COMMUNICATION
[facility] has admitted your patient to Home Health service with the following disciplines:      SN  This report is informational only, no response is needed  Primary focus of home health care post surgical  Patient stated goals of care heal without complications  Anticipated visit pattern and next visit date 1w1 2w3  4/2  See medication list -   Significant clinical findings 2 DINA drains intact incision approximated       Thank you  for allowing us to participate in the care of your patient.

## 2024-04-02 ENCOUNTER — PATIENT OUTREACH (OUTPATIENT)
Dept: CASE MANAGEMENT | Facility: OTHER | Age: 84
End: 2024-04-02

## 2024-04-02 ENCOUNTER — HOME CARE VISIT (OUTPATIENT)
Dept: HOME HEALTH SERVICES | Facility: HOME HEALTHCARE | Age: 84
End: 2024-04-02
Payer: MEDICARE

## 2024-04-02 ENCOUNTER — TELEPHONE (OUTPATIENT)
Dept: GENETICS | Facility: CLINIC | Age: 84
End: 2024-04-02

## 2024-04-02 VITALS
RESPIRATION RATE: 20 BRPM | SYSTOLIC BLOOD PRESSURE: 142 MMHG | TEMPERATURE: 98.7 F | OXYGEN SATURATION: 98 % | HEART RATE: 78 BPM | DIASTOLIC BLOOD PRESSURE: 78 MMHG

## 2024-04-02 PROCEDURE — 10330064 SPONGE, EXCILON SPLIT DRN STR 4"X4" (2/P

## 2024-04-02 PROCEDURE — G0299 HHS/HOSPICE OF RN EA 15 MIN: HCPCS

## 2024-04-02 NOTE — TELEPHONE ENCOUNTER
I called and left a message for the patient as a reminder to have her sample collected for genetic testing.

## 2024-04-05 ENCOUNTER — TELEPHONE (OUTPATIENT)
Dept: SURGICAL ONCOLOGY | Facility: CLINIC | Age: 84
End: 2024-04-05

## 2024-04-05 ENCOUNTER — HOME CARE VISIT (OUTPATIENT)
Dept: HOME HEALTH SERVICES | Facility: HOME HEALTHCARE | Age: 84
End: 2024-04-05
Payer: MEDICARE

## 2024-04-05 VITALS
SYSTOLIC BLOOD PRESSURE: 142 MMHG | DIASTOLIC BLOOD PRESSURE: 82 MMHG | OXYGEN SATURATION: 97 % | TEMPERATURE: 97.2 F | HEART RATE: 80 BPM | RESPIRATION RATE: 18 BRPM

## 2024-04-05 PROCEDURE — G0299 HHS/HOSPICE OF RN EA 15 MIN: HCPCS

## 2024-04-05 NOTE — TELEPHONE ENCOUNTER
Called the patient to reschedule her post-op appointment with Dr. Thomas due to him being in the OR on 4/15. There was no answer so a message was left with a direct call back number provided.

## 2024-04-08 ENCOUNTER — PATIENT OUTREACH (OUTPATIENT)
Dept: HEMATOLOGY ONCOLOGY | Facility: CLINIC | Age: 84
End: 2024-04-08

## 2024-04-08 ENCOUNTER — OFFICE VISIT (OUTPATIENT)
Dept: SURGICAL ONCOLOGY | Facility: CLINIC | Age: 84
End: 2024-04-08

## 2024-04-08 VITALS
HEIGHT: 66 IN | DIASTOLIC BLOOD PRESSURE: 98 MMHG | OXYGEN SATURATION: 95 % | SYSTOLIC BLOOD PRESSURE: 140 MMHG | BODY MASS INDEX: 28.93 KG/M2 | WEIGHT: 180 LBS | HEART RATE: 78 BPM

## 2024-04-08 DIAGNOSIS — Z90.12 HISTORY OF LEFT MASTECTOMY: ICD-10-CM

## 2024-04-08 DIAGNOSIS — Z86.000 HISTORY OF DUCTAL CARCINOMA IN SITU (DCIS) OF BREAST: ICD-10-CM

## 2024-04-08 DIAGNOSIS — D05.11 DUCTAL CARCINOMA IN SITU (DCIS) OF RIGHT BREAST: ICD-10-CM

## 2024-04-08 DIAGNOSIS — Z90.11 STATUS POST RIGHT MASTECTOMY: Primary | ICD-10-CM

## 2024-04-08 PROCEDURE — 99024 POSTOP FOLLOW-UP VISIT: CPT | Performed by: SURGERY

## 2024-04-08 NOTE — TELEPHONE ENCOUNTER
Called the patient for the second time to discuss the same. Offered her a post-op appointment today at 2:30 with Dr. Thomas; which she accepted. The patient verified appointment details and is aware that the visit on 4/15 has been cancelled.

## 2024-04-08 NOTE — PROGRESS NOTES
Surgical Oncology Breast Post-Op       [address] [facility] [place]  [address]  [place] [address]    Eleni Chew  1940  [id number]  [address] [facility] [place]  [address]  [place] [address]    Chief Complaint:   Eleni is seen for a post-operative visit of her recent breast surgery.      Oncology History:     Oncology History   History of ductal carcinoma in situ (DCIS) of breast   5/23/2019 Biopsy    Left breast ultrasound-guided biopsy  10 o'clock, 4 cm from nipple  Ductal Carcinoma in Situ  Grade 2    SC 90     6/13/2019 Biopsy    Left breast biopsy of additional suspicious area  Lower inner quadrant, posterior portion, 8 cm from nipple  Single focus of atypical lobular hyperplasia     7/2/2019 Surgery    Left breast mastectomy with sentinel lymph node biopsy  Ductal carcinoma in situ  4.5 cm  Grade 2  Margins negative  0/1 Lymph nodes  Stage 0     Ductal carcinoma in situ (DCIS) of right breast   2/28/2024 Biopsy    Right breast ultrasound-guided biopsy  A. 12 o'clock, 4 cm from nipple (wing)  Ductal carcinoma in situ  Grade 1  ER 90, SC 90    B. 12 o'clock, retroareolar (ribbon)  Lobular carcinoma in situ    Concordant. Malignancy appears multifocal; masses to include DCIS and LCIS cover an area of 5 cm. US right axilla negative.      3/28/2024 Surgery    Right mastectomy with SLN biopsy  Ductal carcinoma in situ   Grade 2  7 cm  Margins negative  0/1 Lymph node  Stage 0         Assessment & Plan:   Assessment/Plan    The patient presents for postoperative visit.  1 drain is putting out essentially no fluid second drain is putting out approximately 4050 cc a day of serous fluid.  There is no evidence of infection.  Her 1 drain was removed without difficulty.  I reviewed her pathology with her her margins are negative for her DCIS.  She does not need radiation or antihormonal  agent.  Will see her back in approximately 2 weeks for hopefully a second drain pull or sooner if it is less than 30 cc a day.  History of Present Illness:   See Oncology History    Interval History:   See Assessment & Plan    Review of Systems:   Review of Systems   All other systems reviewed and are negative.      Past Medical History:     Patient Active Problem List   Diagnosis    Hypertension    GERD (gastroesophageal reflux disease)    Esophagitis    Hiatal hernia    History of ductal carcinoma in situ (DCIS) of breast    Asthma    Body mass index 30.0-30.9, adult    Low back pain due to bilateral sciatica    Essential hypertension    Gastroesophageal reflux disease without esophagitis    Dry skin    Seasonal allergies    Mixed hyperlipidemia    Essential hypertension, benign    Osteoarthritis of spine with radiculopathy, lumbar region    SOB (shortness of breath)    Pain in right elbow    Stage 3a chronic kidney disease (HCC)    Senile osteoporosis    Primary osteoarthritis of right knee    Ductal carcinoma in situ (DCIS) of right breast     Past Medical History:   Diagnosis Date    Asthma     as a child    Breast cancer (HCC) 06/13/2019    Chronic kidney disease 2022    Esophagitis     GERD (gastroesophageal reflux disease)     Hypertension      Past Surgical History:   Procedure Laterality Date    BREAST LUMPECTOMY Left 1978    benign    DENTAL SURGERY      ESOPHAGOGASTRODUODENOSCOPY N/A 3/19/2018    Procedure: ESOPHAGOGASTRODUODENOSCOPY (EGD);  Surgeon: Farshad Jama MD;  Location: [facility];  Service: Gastroenterology    LYMPH NODE BIOPSY Right 3/28/2024    Procedure: LYMPHATIC MAPPING WITH BLUE AND RADIOACTIVE DYES, SENTINEL LYMPH NODE BIOPSY (INJECT AT 1000 BY DR THOMAS IN THE OR);  Surgeon: Ismael Thomas MD;  Location: [facility];  Service: Surgical Oncology    MAMMO STEREOTACTIC BREAST BIOPSY LEFT (ALL INC) Left 6/13/2019    MASTECTOMY Left 07/02/2019    MASTECTOMY W/ SENTINEL NODE BIOPSY Left 7/2/2019     Procedure: BREAST MASTECTOMY WITH SENTINEL LYMPH NODE BIOPSY, LYMPHATIC MAPPING WITH BLUE DYE AND RADIOACTIVE DYE (INJECT AT 0900 BY DR THOMAS IN THE OR);  Surgeon: Ismael Thomas MD;  [facility];  Service: Surgical Oncology    SIMPLE MASTECTOMY Right 3/28/2024    Procedure: BREAST MASTECTOMY;  Surgeon: Ismael Thomas MD;  [facility];  Service: Surgical Oncology    TONSILLECTOMY      US GUIDANCE BREAST BIOPSY RIGHT EACH ADDITIONAL Right 2024    US GUIDED BREAST BIOPSY LEFT COMPLETE Left 2019    US GUIDED BREAST BIOPSY RIGHT COMPLETE Right 2024     Family History   Problem Relation Age of Onset    Alzheimer's disease Mother     Dementia Mother     No Known Problems Father     No Known Problems Sister     No Known Problems Sister     Multiple sclerosis Sister     Multiple sclerosis Sister     No Known Problems Sister     No Known Problems Sister     Multiple sclerosis Daughter     No Known Problems Maternal Aunt     No Known Problems Maternal Aunt     No Known Problems Maternal Aunt     No Known Problems Maternal Aunt     No Known Problems Maternal Aunt     No Known Problems Maternal Aunt     No Known Problems Maternal Grandmother     No Known Problems Maternal Grandfather     No Known Problems Paternal Grandmother     No Known Problems Paternal Grandfather      Social History     Socioeconomic History    Marital status: /Civil Union     Spouse name: Not on file    Number of children: Not on file    Years of education: Not on file    Highest education level: Not on file   Occupational History    Not on file   Tobacco Use    Smoking status: Former     Current packs/day: 0.00     Average packs/day: 0.5 packs/day for 20.0 years (10.0 ttl pk-yrs)     Types: Cigarettes     Start date:      Quit date: 2013     Years since quittin.2    Smokeless tobacco: Never   Vaping Use    Vaping status: Never Used   Substance and Sexual Activity    Alcohol use: No    Drug use: No    Sexual  activity: Not Currently     Partners: Male     Birth control/protection: None   Other Topics Concern    Not on file   Social History Narrative    Not on file     Social Determinants of Health     Financial Resource Strain: Low Risk  (11/22/2023)    Overall Financial Resource Strain (CARDIA)     Difficulty of Paying Living Expenses: Not hard at all   Food Insecurity: Not on file   Transportation Needs: No Transportation Needs (11/22/2023)    PRAPARE - Transportation     Lack of Transportation (Medical): No     Lack of Transportation (Non-Medical): No   Physical Activity: Not on file   Stress: Not on file   Social Connections: Not on file   Intimate Partner Violence: Not on file   Housing Stability: Not on file       Current Outpatient Medications:     acetaminophen (TYLENOL) 325 mg tablet, Take 2 tablets (650 mg total) by mouth every 4 (four) hours as needed for mild pain, Disp: , Rfl:     amLODIPine (NORVASC) 5 mg tablet, Take 1 tablet (5 mg total) by mouth daily, Disp: 90 tablet, Rfl: 2    Ascorbic Acid (vitamin C) 1000 MG tablet, Take 1,000 mg by mouth daily Do not start before March 1, 2024., Disp: , Rfl:     Cholecalciferol (VITAMIN D PO), Take 1,000 Units by mouth daily Do not start before March 1, 2024., Disp: , Rfl:     oxyCODONE-acetaminophen (Percocet) 5-325 mg per tablet, Take 1 tablet by mouth every 6 (six) hours as needed for moderate pain for up to 10 doses Max Daily Amount: 4 tablets, Disp: 10 tablet, Rfl: 0  Allergies   Allergen Reactions    Hydrochlorothiazide Other (See Comments)     Kidney number goes up    Lisinopril     Losartan Other (See Comments)     Kidney number goes up    Rosuvastatin Nausea Only       Physical Exams:     Vitals:    04/08/24 1428   BP: 140/98   Pulse: 78   SpO2: 95%     Physical Exam  Chest:          Comments: 's no evidence of infection hematoma or seroma.  Drain is functional.  The drain with minimal output was removed without difficulty.        Results & Discussion:    Patient's pathology demonstrated she has 70 cm of ductal carcinoma in situ.  Her margins were negative.  She has had a previous mastectomy.  I recommended we see her back to remove her drain pull which will be a nursing visit most likely and otherwise we will see her back in 6 months.

## 2024-04-08 NOTE — PROGRESS NOTES
Breast Oncology Nurse Navigator    Met with patient at her post-op visit with Dr. Thomas this afternoon.  Patient alone in exam room.  Her  drove her here.  Patient denies complaints.  Will follow-up with Dr Thomas in two weeks.  Knows to call sooner if DINA drain output is < 30 mL daily.  Other DINA drain removed at this time.  Patient declines visit to medical oncology.     Patient has my contact information and knows to reach out with further questions or concerns.

## 2024-04-09 ENCOUNTER — HOME CARE VISIT (OUTPATIENT)
Dept: HOME HEALTH SERVICES | Facility: HOME HEALTHCARE | Age: 84
End: 2024-04-09
Payer: MEDICARE

## 2024-04-09 VITALS
SYSTOLIC BLOOD PRESSURE: 120 MMHG | DIASTOLIC BLOOD PRESSURE: 60 MMHG | RESPIRATION RATE: 16 BRPM | TEMPERATURE: 98.2 F | OXYGEN SATURATION: 98 % | HEART RATE: 78 BPM

## 2024-04-09 LAB
DME PARACHUTE DELIVERY DATE REQUESTED: NORMAL
DME PARACHUTE ITEM DESCRIPTION: NORMAL
DME PARACHUTE ITEM DESCRIPTION: NORMAL
DME PARACHUTE ORDER STATUS: NORMAL
DME PARACHUTE SUPPLIER NAME: NORMAL
DME PARACHUTE SUPPLIER PHONE: NORMAL

## 2024-04-09 PROCEDURE — G0300 HHS/HOSPICE OF LPN EA 15 MIN: HCPCS

## 2024-04-09 PROCEDURE — G0180 MD CERTIFICATION HHA PATIENT: HCPCS | Performed by: SURGERY

## 2024-04-11 ENCOUNTER — TELEPHONE (OUTPATIENT)
Dept: SURGICAL ONCOLOGY | Facility: CLINIC | Age: 84
End: 2024-04-11

## 2024-04-11 NOTE — TELEPHONE ENCOUNTER
"The patient called the office today and left the following message: \"Michael Adhikari, this is Eleni Chew. My drain tube is less than 30 milliliters in 224 hour days. Doctor William told me to call so that he could get me in to remove it. My number is [phone]. Thank you. pacheco Olmedo.\"    Returned her call to further discuss. The patient stated that her drain output total on 4/08 was 30 mL, on 4/09 25 mL, and on 4/10 28 mL. She reported that her output so far today is 20 mL. Offered the patient a same-day office visit for her drain removal. The patient declined due to her 's dialysis schedule. She did accept an appointment offered tomorrow at 11:30 with MAGNOLIA Duncan. She verified appointment details and is aware that a referral to PT would be placed at that visit. The patient was appreciative of the call back.  "

## 2024-04-12 ENCOUNTER — OFFICE VISIT (OUTPATIENT)
Dept: SURGICAL ONCOLOGY | Facility: CLINIC | Age: 84
End: 2024-04-12

## 2024-04-12 VITALS
OXYGEN SATURATION: 98 % | TEMPERATURE: 97.5 F | SYSTOLIC BLOOD PRESSURE: 124 MMHG | WEIGHT: 180 LBS | HEIGHT: 66 IN | RESPIRATION RATE: 18 BRPM | HEART RATE: 85 BPM | BODY MASS INDEX: 28.93 KG/M2 | DIASTOLIC BLOOD PRESSURE: 82 MMHG

## 2024-04-12 DIAGNOSIS — D05.11 DUCTAL CARCINOMA IN SITU (DCIS) OF RIGHT BREAST: ICD-10-CM

## 2024-04-12 DIAGNOSIS — Z91.89 AT RISK FOR LYMPHEDEMA: ICD-10-CM

## 2024-04-12 DIAGNOSIS — Z48.89 ENCOUNTER FOR POSTOPERATIVE WOUND CARE: ICD-10-CM

## 2024-04-12 DIAGNOSIS — Z90.11 STATUS POST RIGHT MASTECTOMY: Primary | ICD-10-CM

## 2024-04-12 PROCEDURE — 99024 POSTOP FOLLOW-UP VISIT: CPT

## 2024-04-12 NOTE — PROGRESS NOTES
Surgical Oncology Follow Up       [facility] [place]  [address]  [place] [address]    Eleni Chew  1940  [id number]  [facility] [place]  [address]  [place] [address]    Diagnoses and all orders for this visit:    Status post right mastectomy  -     Ambulatory referral to Physical Therapy; Future    At risk for lymphedema  -     Ambulatory referral to Physical Therapy; Future    Ductal carcinoma in situ (DCIS) of right breast  -     Ambulatory referral to Physical Therapy; Future        Chief Complaint   Patient presents with    Post-op       Return if symptoms worsen or fail to improve.      Oncology History   History of ductal carcinoma in situ (DCIS) of breast   5/23/2019 Biopsy    Left breast ultrasound-guided biopsy  10 o'clock, 4 cm from nipple  Ductal Carcinoma in Situ  Grade 2    CO 90     6/13/2019 Biopsy    Left breast biopsy of additional suspicious area  Lower inner quadrant, posterior portion, 8 cm from nipple  Single focus of atypical lobular hyperplasia     7/2/2019 Surgery    Left breast mastectomy with sentinel lymph node biopsy  Ductal carcinoma in situ  4.5 cm  Grade 2  Margins negative  0/1 Lymph nodes  Stage 0     7/10/2019 -  Cancer Staged    Staging form: Breast, AJCC 8th Edition  - Pathologic: Stage 0 (pTis (DCIS), pN0(sn), cM0, G2, ER+, CO+, HER2: Not Assessed) - Signed by Ismael Thomas MD on 4/8/2024  Neoadjuvant therapy: No  Method of lymph node assessment: [place] lymph node biopsy  Histologic grading system: 3 grade system  Laterality: Left  Tumor size (mm): 45       Ductal carcinoma in situ (DCIS) of right breast   2/28/2024 Biopsy    Right breast ultrasound-guided biopsy  A. 12 o'clock, 4 cm from nipple (wing)  Ductal carcinoma in situ  Grade 1  ER 90, CO 90    B. 12 o'clock, retroareolar (ribbon)  Lobular carcinoma in  situ    Concordant. Malignancy appears multifocal; masses to include DCIS and LCIS cover an area of 5 cm. US right axilla negative.      3/28/2024 Surgery    Right mastectomy with SLN biopsy  Ductal carcinoma in situ   Grade 2  7 cm  Margins negative  0/1 Lymph node  Stage 0     3/28/2024 -  Cancer Staged    Staging form: Breast, AJCC 8th Edition  - Pathologic stage from 3/28/2024: Stage 0 (pTis (DCIS), pN0(sn), cM0, ER+, WA+, HER2: Unknown) - Signed by Ismael Thomas MD on 4/8/2024  Stage prefix: Initial diagnosis  Method of lymph node assessment: [place] lymph node biopsy  Nuclear grade: G2  Multigene prognostic tests performed: None           History of Present Illness: The patient presents to the office today for post-op wound and drain evaluation. She is 2 weeks s/p right breast mastectomy.  She reports the area is healing well and denies any post-op issues.  She reports her drainage has been 65, 50, 45, and 30cc over the last 4 days respectively.  Denies any fever or chills.      Review of Systems   Constitutional:  Negative for activity change, appetite change, chills and fever.   HENT: Negative.     Respiratory: Negative.     Cardiovascular: Negative.    Gastrointestinal: Negative.  Negative for nausea and vomiting.   Musculoskeletal: Negative.    Skin:  Positive for wound. Negative for color change and rash.   Neurological: Negative.    Hematological: Negative.  Negative for adenopathy.   Psychiatric/Behavioral: Negative.               Patient Active Problem List   Diagnosis    Hypertension    GERD (gastroesophageal reflux disease)    Esophagitis    Hiatal hernia    History of ductal carcinoma in situ (DCIS) of breast    Asthma    Body mass index 30.0-30.9, adult    Low back pain due to bilateral sciatica    Essential hypertension    Gastroesophageal reflux disease without esophagitis    Dry skin    Seasonal allergies    Mixed hyperlipidemia    Essential hypertension, benign    Osteoarthritis of spine with  radiculopathy, lumbar region    SOB (shortness of breath)    Pain in right elbow    Stage 3a chronic kidney disease (HCC)    Senile osteoporosis    Primary osteoarthritis of right knee    Ductal carcinoma in situ (DCIS) of right breast    History of left mastectomy    Status post right mastectomy     Past Medical History:   Diagnosis Date    Asthma     as a child    Breast cancer (HCC) 06/13/2019    Chronic kidney disease 2022    Esophagitis     GERD (gastroesophageal reflux disease)     Hypertension      Past Surgical History:   Procedure Laterality Date    BREAST LUMPECTOMY Left 1978    benign    DENTAL SURGERY      ESOPHAGOGASTRODUODENOSCOPY N/A 3/19/2018    Procedure: ESOPHAGOGASTRODUODENOSCOPY (EGD);  Surgeon: Farshad Jama MD;  Location: [facility];  Service: Gastroenterology    LYMPH NODE BIOPSY Right 3/28/2024    Procedure: LYMPHATIC MAPPING WITH BLUE AND RADIOACTIVE DYES, SENTINEL LYMPH NODE BIOPSY (INJECT AT 1000 BY DR THOMAS IN THE OR);  Surgeon: Ismael Thomas MD;  Location[facility];  Service: Surgical Oncology    MAMMO STEREOTACTIC BREAST BIOPSY LEFT (ALL INC) Left 6/13/2019    MASTECTOMY Left 07/02/2019    MASTECTOMY W/ SENTINEL NODE BIOPSY Left 7/2/2019    Procedure: BREAST MASTECTOMY WITH SENTINEL LYMPH NODE BIOPSY, LYMPHATIC MAPPING WITH BLUE DYE AND RADIOACTIVE DYE (INJECT AT 0900 BY DR THOMAS IN THE OR);  Surgeon: Ismael Thomas MD;  [facility];  Service: Surgical Oncology    SIMPLE MASTECTOMY Right 3/28/2024    Procedure: BREAST MASTECTOMY;  Surgeon: Ismael Thomas MD;  [facility];  Service: Surgical Oncology    TONSILLECTOMY      US GUIDANCE BREAST BIOPSY RIGHT EACH ADDITIONAL Right 2/28/2024    US GUIDED BREAST BIOPSY LEFT COMPLETE Left 5/23/2019    US GUIDED BREAST BIOPSY RIGHT COMPLETE Right 2/28/2024     Family History   Problem Relation Age of Onset    Alzheimer's disease Mother     Dementia Mother     No Known Problems Father     No Known Problems Sister     No Known Problems  Sister     Multiple sclerosis Sister     Multiple sclerosis Sister     No Known Problems Sister     No Known Problems Sister     Multiple sclerosis Daughter     No Known Problems Maternal Aunt     No Known Problems Maternal Aunt     No Known Problems Maternal Aunt     No Known Problems Maternal Aunt     No Known Problems Maternal Aunt     No Known Problems Maternal Aunt     No Known Problems Maternal Grandmother     No Known Problems Maternal Grandfather     No Known Problems Paternal Grandmother     No Known Problems Paternal Grandfather      Social History     Socioeconomic History    Marital status: /Civil Union     Spouse name: Not on file    Number of children: Not on file    Years of education: Not on file    Highest education level: Not on file   Occupational History    Not on file   Tobacco Use    Smoking status: Former     Current packs/day: 0.00     Average packs/day: 0.5 packs/day for 20.0 years (10.0 ttl pk-yrs)     Types: Cigarettes     Start date:      Quit date:      Years since quittin.2    Smokeless tobacco: Never   Vaping Use    Vaping status: Never Used   Substance and Sexual Activity    Alcohol use: No    Drug use: No    Sexual activity: Not Currently     Partners: Male     Birth control/protection: None   Other Topics Concern    Not on file   Social History Narrative    Not on file     Social Determinants of Health     Financial Resource Strain: Low Risk  (2023)    Overall Financial Resource Strain (CARDIA)     Difficulty of Paying Living Expenses: Not hard at all   Food Insecurity: Not on file   Transportation Needs: No Transportation Needs (2023)    PRAPARE - Transportation     Lack of Transportation (Medical): No     Lack of Transportation (Non-Medical): No   Physical Activity: Not on file   Stress: Not on file   Social Connections: Not on file   Intimate Partner Violence: Not on file   Housing Stability: Not on file       Current Outpatient Medications:      acetaminophen (TYLENOL) 325 mg tablet, Take 2 tablets (650 mg total) by mouth every 4 (four) hours as needed for mild pain, Disp: , Rfl:     amLODIPine (NORVASC) 5 mg tablet, Take 1 tablet (5 mg total) by mouth daily, Disp: 90 tablet, Rfl: 2    Ascorbic Acid (vitamin C) 1000 MG tablet, Take 1,000 mg by mouth daily Do not start before March 1, 2024., Disp: , Rfl:     Cholecalciferol (VITAMIN D PO), Take 1,000 Units by mouth daily Do not start before March 1, 2024., Disp: , Rfl:     oxyCODONE-acetaminophen (Percocet) 5-325 mg per tablet, Take 1 tablet by mouth every 6 (six) hours as needed for moderate pain for up to 10 doses Max Daily Amount: 4 tablets (Patient not taking: Reported on 4/12/2024), Disp: 10 tablet, Rfl: 0  Allergies   Allergen Reactions    Hydrochlorothiazide Other (See Comments)     Kidney number goes up    Lisinopril     Losartan Other (See Comments)     Kidney number goes up    Rosuvastatin Nausea Only     Vitals:    04/12/24 1131   BP: 124/82   Pulse: 85   Resp: 18   Temp: 97.5 °F (36.4 °C)   SpO2: 98%       Physical Exam  Vitals reviewed.   Constitutional:       General: She is not in acute distress.     Appearance: Normal appearance. She is normal weight. She is not ill-appearing or toxic-appearing.   HENT:      Head: Normocephalic and atraumatic.      Nose: Nose normal.      Mouth/Throat:      Mouth: Mucous membranes are moist.   Eyes:      General: No scleral icterus.     Conjunctiva/sclera: Conjunctivae normal.   Cardiovascular:      Rate and Rhythm: Normal rate.   Pulmonary:      Effort: Pulmonary effort is normal.   Chest:   Breasts:     Right: Absent.      Left: Absent.          Comments: Right mastectomy incision is clean, dry and intact. DINA drain intact with moderate collection of serous drainage. There is a significant amount of thick drainage/tissue in the proximal drain tubing.  Musculoskeletal:         General: No swelling or tenderness. Normal range of motion.      Cervical back:  Normal range of motion and neck supple.   Skin:     General: Skin is warm and dry.      Coloration: Skin is not jaundiced.      Findings: No erythema or rash.   Neurological:      General: No focal deficit present.      Mental Status: She is alert and oriented to person, place, and time.   Psychiatric:         Mood and Affect: Mood normal.         Behavior: Behavior normal.         Thought Content: Thought content normal.         Judgment: Judgment normal.           Discussion/Summary: This is an 82 y/o female s/p mastectomy presenting today for drain care/removal.  I have thoroughly stripped the drain, resulting in additional fluid release.  After reviewing her documented drainage amounts, I explained that she is at risk for developing a seroma if I remove the drain today, and my recommendation would be to leave it in place for at least 2-3 more days.  If we remove the drain and a seroma develops, she would likely require bedside aspiration.  After considering the options, she was agreeable to leaving the drain in place.  She will follow-up with one of our nurses next week for drain removal as long as her amounts continue to decrease.

## 2024-04-15 ENCOUNTER — TELEPHONE (OUTPATIENT)
Dept: SURGICAL ONCOLOGY | Facility: CLINIC | Age: 84
End: 2024-04-15

## 2024-04-15 ENCOUNTER — PATIENT OUTREACH (OUTPATIENT)
Dept: CASE MANAGEMENT | Facility: OTHER | Age: 84
End: 2024-04-15

## 2024-04-15 NOTE — TELEPHONE ENCOUNTER
Called the patient to follow up with her drain output. The patient reported an output of 18-20 mL each time her drain was emptied over the past 3 consecutive days. The patient stated she is emptying her drain 2 times a day. Informed the patient that per Dr. Thomas, it was too soon for her drain to be removed and she should wait until closer to the 3 week post-op jordy to help avoid complications. The patient was agreeable to this and accepted a nurse visit on 4/17 at 12:30. Informed the patient that she would receive a phone call that morning to review her output again before the visit. The patient verbalized understanding of everything discussed and was appreciative of the call.

## 2024-04-15 NOTE — PROGRESS NOTES
OSW placed three outreach TC's to the patient. She has not returned any of the calls. OSW will close the referral at this time.

## 2024-04-16 ENCOUNTER — HOME CARE VISIT (OUTPATIENT)
Dept: HOME HEALTH SERVICES | Facility: HOME HEALTHCARE | Age: 84
End: 2024-04-16
Payer: MEDICARE

## 2024-04-16 VITALS
TEMPERATURE: 97.9 F | RESPIRATION RATE: 18 BRPM | OXYGEN SATURATION: 96 % | SYSTOLIC BLOOD PRESSURE: 148 MMHG | DIASTOLIC BLOOD PRESSURE: 90 MMHG | HEART RATE: 81 BPM

## 2024-04-16 PROCEDURE — G0299 HHS/HOSPICE OF RN EA 15 MIN: HCPCS

## 2024-04-17 ENCOUNTER — TELEPHONE (OUTPATIENT)
Dept: SURGICAL ONCOLOGY | Facility: CLINIC | Age: 84
End: 2024-04-17

## 2024-04-17 NOTE — TELEPHONE ENCOUNTER
Called the patient this morning to review her DINA drain output prior to her nurse visit this afternoon. The patient reported a total daily output of 30 mL on 4/14, 50 mL on 4/15 and 40 mL on 4/16. She stated that [facility] saw her yesterday and noted seepage around the drain insertion site, but no other issues. Reviewed this with Dr. Thomas who recommended that the patient wait until Friday to have her drain pulled. Informed the patient of his recommendation and she was in agreement with this. The patient was scheduled to see MAGNOLIA Duncan at 2:00 on 4/19 and she verified appointment details. Explained that due to her elevated output, she may still develop a fluid collection in her breast after the drain was removed. The patient was instructed to follow up with the office on Monday if she noticed any breast swelling over the weekend once her drain was pulled. The patient verbalized understanding and denied any questions at this time.

## 2024-04-18 ENCOUNTER — HOME CARE VISIT (OUTPATIENT)
Dept: HOME HEALTH SERVICES | Facility: HOME HEALTHCARE | Age: 84
End: 2024-04-18
Payer: MEDICARE

## 2024-04-18 VITALS
OXYGEN SATURATION: 97 % | RESPIRATION RATE: 18 BRPM | SYSTOLIC BLOOD PRESSURE: 138 MMHG | DIASTOLIC BLOOD PRESSURE: 68 MMHG | TEMPERATURE: 98.6 F | HEART RATE: 100 BPM

## 2024-04-18 PROCEDURE — G0299 HHS/HOSPICE OF RN EA 15 MIN: HCPCS

## 2024-04-19 ENCOUNTER — OFFICE VISIT (OUTPATIENT)
Dept: SURGICAL ONCOLOGY | Facility: CLINIC | Age: 84
End: 2024-04-19

## 2024-04-19 ENCOUNTER — PATIENT OUTREACH (OUTPATIENT)
Dept: HEMATOLOGY ONCOLOGY | Facility: CLINIC | Age: 84
End: 2024-04-19

## 2024-04-19 VITALS
DIASTOLIC BLOOD PRESSURE: 84 MMHG | OXYGEN SATURATION: 98 % | RESPIRATION RATE: 18 BRPM | HEART RATE: 79 BPM | HEIGHT: 66 IN | TEMPERATURE: 97.3 F | BODY MASS INDEX: 28.77 KG/M2 | WEIGHT: 179 LBS | SYSTOLIC BLOOD PRESSURE: 118 MMHG

## 2024-04-19 DIAGNOSIS — D05.11 DUCTAL CARCINOMA IN SITU (DCIS) OF RIGHT BREAST: Primary | ICD-10-CM

## 2024-04-19 DIAGNOSIS — Z48.89 ENCOUNTER FOR POSTOPERATIVE WOUND CARE: ICD-10-CM

## 2024-04-19 PROCEDURE — 99024 POSTOP FOLLOW-UP VISIT: CPT

## 2024-04-19 NOTE — PROGRESS NOTES
Contacted patient to offer appointment with the [facility] .    Now that you are wrapping up your active cancer treatments, we would like to introduce you to our breast cancer survivorship program.   Cancer survivorship care provides services that help cancer survivors live well after cancer treatment. It may help you manage physical and emotional needs and help you adjust to your new life. Survivorship care focuses on improving health, wellness, and quality of life.   At this visit, you will meet with our nurse practitioner, Daniel Padilla, who will review your cancer treatment summary to date and provide you with a survivorship care plan. The care plan is a document that organizes information about your recommended follow up, discusses long term and late effects of your cancer treatments, and provides suggestions for healthy living.  There are many resources available at [facility] and in the community that can help you live well as a survivor. Your personal needs will be assessed and addressed during this visit.  These visits will take place at our [facility] located at [address] [place] [address], [address], [place] [address]. Although we prefer these visits to occur in person, we understand this may not always be possible. Please let us know if you prefer a virtual visit. The initial survivorship visit is a longer appt- we set aside one hour for this appt.  Daniel typically performs these visits on Tuesdays, Wednesdays, and Thursdays. Pt is not currently interested in the program but will call us if she changes her mind.

## 2024-04-19 NOTE — PROGRESS NOTES
Surgical Oncology Follow Up       [facility] [place]  [address] [place] [address]    Eleni Chew  1940  [id number]  [facility] [place]  [address]  [place] [address]    Diagnoses and all orders for this visit:    Ductal carcinoma in situ (DCIS) of right breast    Encounter for postoperative wound care        Chief Complaint   Patient presents with    Post-op       No follow-ups on file.      Oncology History   History of ductal carcinoma in situ (DCIS) of breast   5/23/2019 Biopsy    Left breast ultrasound-guided biopsy  10 o'clock, 4 cm from nipple  Ductal Carcinoma in Situ  Grade 2    OR 90     6/13/2019 Biopsy    Left breast biopsy of additional suspicious area  Lower inner quadrant, posterior portion, 8 cm from nipple  Single focus of atypical lobular hyperplasia     7/2/2019 Surgery    Left breast mastectomy with sentinel lymph node biopsy  Ductal carcinoma in situ  4.5 cm  Grade 2  Margins negative  0/1 Lymph nodes  Stage 0     7/10/2019 -  Cancer Staged    Staging form: Breast, AJCC 8th Edition  - Pathologic: Stage 0 (pTis (DCIS), pN0(sn), cM0, G2, ER+, OR+, HER2: Not Assessed) - Signed by Ismael Thomas MD on 4/8/2024  Neoadjuvant therapy: No  Method of lymph node assessment: [place] lymph node biopsy  Histologic grading system: 3 grade system  Laterality: Left  Tumor size (mm): 45       Ductal carcinoma in situ (DCIS) of right breast   2/28/2024 Biopsy    Right breast ultrasound-guided biopsy  A. 12 o'clock, 4 cm from nipple (wing)  Ductal carcinoma in situ  Grade 1  ER 90, OR 90    B. 12 o'clock, retroareolar (ribbon)  Lobular carcinoma in situ    Concordant. Malignancy appears multifocal; masses to include DCIS and LCIS cover an area of 5 cm. US right axilla negative.      3/28/2024 Surgery    Right mastectomy with SLN biopsy  Ductal carcinoma in  situ   Grade 2  7 cm  Margins negative  0/1 Lymph node  Stage 0     3/28/2024 -  Cancer Staged    Staging form: Breast, AJCC 8th Edition  - Pathologic stage from 3/28/2024: Stage 0 (pTis (DCIS), pN0(sn), cM0, ER+, UT+, HER2: Unknown) - Signed by Ismael Thomas MD on 4/8/2024  Stage prefix: Initial diagnosis  Method of lymph node assessment: [place] lymph node biopsy  Nuclear grade: G2  Multigene prognostic tests performed: None             History of Present Illness: The patient returns to the office today for DINA drain removal.  She underwent right beast mastectomy 3 weeks ago, and has been tacking daily output.  It has been averaging 15-25cc daily for the past several days.  She denies any new pain, redness or swelling,.      Review of Systems  Complete ROS Surg Onc:   Complete ROS Surg Onc:   Constitutional: The patient denies new or recent history of general fatigue, no recent weight loss, no change in appetite.   Eyes: No complaints of visual problems, no scleral icterus.   ENT: no complaints of ear pain, no hoarseness, no difficulty swallowing,  no tinnitus and no new masses in head, oral cavity, or neck.   Cardiovascular: No complaints of chest pain, no palpitations, no ankle edema.   Respiratory: No complaints of shortness of breath, no cough.   Gastrointestinal: No complaints of jaundice, no bloody stools, no pale stools.   Genitourinary: No complaints of dysuria, no hematuria, no nocturia, no frequent urination, no urethral discharge.   Musculoskeletal: No complaints of weakness, paralysis, joint stiffness or arthralgias.  Integumentary: No complaints of rash, no new lesions.   Neurological: No complaints of convulsions, no seizures, no dizziness.   Hematologic/Lymphatic: No complaints of easy bruising.   Endocrine:  No hot or cold intolerance.  No polydipsia, polyphagia, or polyuria.  Allergy/immunology:  No environmental allergies.  No food allergies.  Not immunocompromised.  Skin:  No pallor or rash.           Patient Active Problem List   Diagnosis    Hypertension    GERD (gastroesophageal reflux disease)    Esophagitis    Hiatal hernia    History of ductal carcinoma in situ (DCIS) of breast    Asthma    Body mass index 30.0-30.9, adult    Low back pain due to bilateral sciatica    Essential hypertension    Gastroesophageal reflux disease without esophagitis    Dry skin    Seasonal allergies    Mixed hyperlipidemia    Essential hypertension, benign    Osteoarthritis of spine with radiculopathy, lumbar region    SOB (shortness of breath)    Pain in right elbow    Stage 3a chronic kidney disease (HCC)    Senile osteoporosis    Primary osteoarthritis of right knee    Ductal carcinoma in situ (DCIS) of right breast    History of left mastectomy    Status post right mastectomy     Past Medical History:   Diagnosis Date    Asthma     as a child    Breast cancer (HCC) 06/13/2019    Chronic kidney disease 2022    Esophagitis     GERD (gastroesophageal reflux disease)     Hypertension      Past Surgical History:   Procedure Laterality Date    BREAST LUMPECTOMY Left 1978    benign    DENTAL SURGERY      ESOPHAGOGASTRODUODENOSCOPY N/A 3/19/2018    Procedure: ESOPHAGOGASTRODUODENOSCOPY (EGD);  Surgeon: Farshad Jama MD;  Location: [facility];  Service: Gastroenterology    LYMPH NODE BIOPSY Right 3/28/2024    Procedure: LYMPHATIC MAPPING WITH BLUE AND RADIOACTIVE DYES, SENTINEL LYMPH NODE BIOPSY (INJECT AT 1000 BY DR THOMAS IN THE OR);  Surgeon: Ismael Thomas MD;  [facility];  Service: Surgical Oncology    MAMMO STEREOTACTIC BREAST BIOPSY LEFT (ALL INC) Left 6/13/2019    MASTECTOMY Left 07/02/2019    MASTECTOMY W/ SENTINEL NODE BIOPSY Left 7/2/2019    Procedure: BREAST MASTECTOMY WITH SENTINEL LYMPH NODE BIOPSY, LYMPHATIC MAPPING WITH BLUE DYE AND RADIOACTIVE DYE (INJECT AT 0900 BY DR THOMAS IN THE OR);  Surgeon: Ismael Thomas MD;  [facility];  Service: Surgical Oncology    SIMPLE MASTECTOMY Right 3/28/2024     Procedure: BREAST MASTECTOMY;  Surgeon: Ismael Thomas MD;  [facility];  Service: Surgical Oncology    TONSILLECTOMY      US GUIDANCE BREAST BIOPSY RIGHT EACH ADDITIONAL Right 2024    US GUIDED BREAST BIOPSY LEFT COMPLETE Left 2019    US GUIDED BREAST BIOPSY RIGHT COMPLETE Right 2024     Family History   Problem Relation Age of Onset    Alzheimer's disease Mother     Dementia Mother     No Known Problems Father     No Known Problems Sister     No Known Problems Sister     Multiple sclerosis Sister     Multiple sclerosis Sister     No Known Problems Sister     No Known Problems Sister     Multiple sclerosis Daughter     No Known Problems Maternal Aunt     No Known Problems Maternal Aunt     No Known Problems Maternal Aunt     No Known Problems Maternal Aunt     No Known Problems Maternal Aunt     No Known Problems Maternal Aunt     No Known Problems Maternal Grandmother     No Known Problems Maternal Grandfather     No Known Problems Paternal Grandmother     No Known Problems Paternal Grandfather      Social History     Socioeconomic History    Marital status: /Civil Union     Spouse name: Not on file    Number of children: Not on file    Years of education: Not on file    Highest education level: Not on file   Occupational History    Not on file   Tobacco Use    Smoking status: Former     Current packs/day: 0.00     Average packs/day: 0.5 packs/day for 20.0 years (10.0 ttl pk-yrs)     Types: Cigarettes     Start date:      Quit date: 2013     Years since quittin.3    Smokeless tobacco: Never   Vaping Use    Vaping status: Never Used   Substance and Sexual Activity    Alcohol use: No    Drug use: No    Sexual activity: Not Currently     Partners: Male     Birth control/protection: None   Other Topics Concern    Not on file   Social History Narrative    Not on file     Social Determinants of Health     Financial Resource Strain: Low Risk  (2023)    Overall Financial Resource  Strain (CARDIA)     Difficulty of Paying Living Expenses: Not hard at all   Food Insecurity: Not on file   Transportation Needs: No Transportation Needs (11/22/2023)    PRAPARE - Transportation     Lack of Transportation (Medical): No     Lack of Transportation (Non-Medical): No   Physical Activity: Not on file   Stress: Not on file   Social Connections: Not on file   Intimate Partner Violence: Not on file   Housing Stability: Not on file       Current Outpatient Medications:     acetaminophen (TYLENOL) 325 mg tablet, Take 2 tablets (650 mg total) by mouth every 4 (four) hours as needed for mild pain, Disp: , Rfl:     amLODIPine (NORVASC) 5 mg tablet, Take 1 tablet (5 mg total) by mouth daily, Disp: 90 tablet, Rfl: 2    Ascorbic Acid (vitamin C) 1000 MG tablet, Take 1,000 mg by mouth daily Do not start before March 1, 2024., Disp: , Rfl:     Cholecalciferol (VITAMIN D PO), Take 1,000 Units by mouth daily Do not start before March 1, 2024., Disp: , Rfl:     oxyCODONE-acetaminophen (Percocet) 5-325 mg per tablet, Take 1 tablet by mouth every 6 (six) hours as needed for moderate pain for up to 10 doses Max Daily Amount: 4 tablets (Patient not taking: Reported on 4/12/2024), Disp: 10 tablet, Rfl: 0  Allergies   Allergen Reactions    Hydrochlorothiazide Other (See Comments)     Kidney number goes up    Lisinopril     Losartan Other (See Comments)     Kidney number goes up    Rosuvastatin Nausea Only     Vitals:    04/19/24 1356   BP: 118/84   Pulse: 79   Resp: 18   Temp: (!) 97.3 °F (36.3 °C)   SpO2: 98%       Physical Exam  Vitals reviewed.   Constitutional:       General: She is not in acute distress.     Appearance: Normal appearance. She is normal weight. She is not ill-appearing or toxic-appearing.   HENT:      Head: Normocephalic and atraumatic.      Nose: Nose normal.      Mouth/Throat:      Mouth: Mucous membranes are moist.   Eyes:      General: No scleral icterus.     Conjunctiva/sclera: Conjunctivae normal.    Cardiovascular:      Rate and Rhythm: Normal rate.   Pulmonary:      Effort: Pulmonary effort is normal.   Chest:          Comments: Right mastectomy incision is healing well.  DINA drain intact, collecting straw-colored serous fluid.  No evidence of infection.  Musculoskeletal:         General: No swelling. Normal range of motion.      Cervical back: Normal range of motion and neck supple.   Skin:     General: Skin is warm and dry.      Findings: No erythema.   Neurological:      General: No focal deficit present.      Mental Status: She is alert and oriented to person, place, and time.   Psychiatric:         Mood and Affect: Mood normal.         Behavior: Behavior normal.         Thought Content: Thought content normal.         Judgment: Judgment normal.           Discussion/Summary: This is an 84 y/o female who presents today for  continued post-op care. I have removed her drain today without incident.  She has been advised to call if she notices any redness or swelling.  We will see her again in October for another clinical exam, or sooner should the need arise.  She is agreeable to the plan, all questions were answered today.

## 2024-04-25 ENCOUNTER — TELEPHONE (OUTPATIENT)
Dept: OBGYN CLINIC | Facility: OTHER | Age: 84
End: 2024-04-25

## 2024-04-25 NOTE — TELEPHONE ENCOUNTER
SOMEONE PICKED UP AND DID NO RESPOND BACK. I HUNG UP AND CALLED BACK AGAIN AND THE SAME THING HAPPENED. WAS CALLING PT TO SEE IF WE CAN SCHEDULE BOUTIQUE BRA FITTING.

## 2024-05-08 ENCOUNTER — EVALUATION (OUTPATIENT)
Dept: PHYSICAL THERAPY | Facility: REHABILITATION | Age: 84
End: 2024-05-08
Payer: MEDICARE

## 2024-05-08 DIAGNOSIS — D05.11 DUCTAL CARCINOMA IN SITU (DCIS) OF RIGHT BREAST: ICD-10-CM

## 2024-05-08 DIAGNOSIS — Z91.89 AT RISK FOR LYMPHEDEMA: ICD-10-CM

## 2024-05-08 DIAGNOSIS — Z90.11 STATUS POST RIGHT MASTECTOMY: Primary | ICD-10-CM

## 2024-05-08 PROCEDURE — 97110 THERAPEUTIC EXERCISES: CPT | Performed by: PHYSICAL THERAPIST

## 2024-05-08 PROCEDURE — 97162 PT EVAL MOD COMPLEX 30 MIN: CPT | Performed by: PHYSICAL THERAPIST

## 2024-05-08 NOTE — PROGRESS NOTES
Lymphedema Evaluation    Today's Date: 2024  Patient name: Eleni Chew  : 1940  MRN: [id number]  Referring provider: Lexus Hines CRNP  Dx:  Encounter Diagnosis     ICD-10-CM    1. Status post right mastectomy  Z90.11 Ambulatory referral to Physical Therapy      2. At risk for lymphedema  Z91.89 Ambulatory referral to Physical Therapy      3. Ductal carcinoma in situ (DCIS) of right breast  D05.11 Ambulatory referral to Physical Therapy        Assessment  Assessment details: Eleni Chew is a 83 y.o. female with history of R DCIS breast cancer s/p mastectomy 6 weeks ago presenting for rehabilitation of upper quarter function and axillary web syndrome.  She is a good candidate for Strength After Breast Cancer program due to impairments of upper quarter strength and mobility. She does not have UE lymphedema but she does present with R chest wall edema and scar restrictions. Skilled rehabilitative exercise program to include aerobic, strengthening of core/UE/LE, and generalized flexibility. She will be educated on monitoring symptoms of pain vs lymphedema and how to progress exercise safely and effectively.     During initial evaluation, education was provided on lymphatic anatomy and physiology, lymphedema risk reduction behaviors, and healthy habits; decongestion vs maintenance treatment options. Recommendations were made for compression garments, skin care,and muscle pump exercises to address edema and patient consented to these recommendations. She is in agreement with recommended plan of care and goals for therapy, and demonstrates motivation for active participation in proposed plan of care.  Understanding of Dx/Px/POC: good   Prognosis: good    Goals  -Patient will demonstrate full and painfree shoulder flexion and abduction in 8 weeks.  -Patient will demonstrate 50% improvement in R chest wall scar mobility in 8 weeks.  - No symptoms of axillary web syndrome in 8 weeks.    Plan  Frequency:  1x week  Duration in weeks: 8  Plan of Care beginning date: 5/8/2024  Plan of Care expiration date: 7/3/2024        Subjective/History:  Chief Complaint: R shoulder weakness and pain- pain is strong pulling sensation through upper arm, axilla, and lateral chest wall; lateral chest wall lymphedema edema (near drain site).  Cancer history and treatments  Type of CA: L breast DCIS  Stage: 0  Date of diagnosis: 05/2019  Surgical excision: 7/2/19 Left breast mastectomy with sentinel lymph node biopsy  Lymph node dissection? 0/1 Lymph nodes   Cancer history and treatments  Type of CA: R breast DCIS, LCIS (multifocal)  Stage: 0  stGstrstastdstest:st st1st Date of diagnosis: 02/2024  Surgical excision: 03/28/2024 right  breast mastectomy with sentinel lymph node biopsy; drain removed 4/19/24  Lymph node dissection? 0/1 Lymph node   No other planned treatments    Pain: 4/10  Function: indep in all ADLs; has not resumed vacuuming; lives with , also adult son and his family  Working Status: retired  Exercise status: none intentionally, but is physically active- walks [phone]  Patient goals: recover function    Objective  Lymphedema Exam: Upper Extremity  Hand dominance: right  Chest Wall/Breast Edema location and quality:R chest wall anterolaterally in line with acromion process- firm nonpitting edema proximal to mastectomy scar, just lateral to an area of adherent tissue of chest wall/skin  Upper extremity Edema location and quality: right  No lymphedema visualized  Lymphedema classification (0 latent, 1 reverse, 2 non-rev, 3 elephantiasis): 0  >> REFER TO FLOW SHEET FOR GIRTH MEASUREMENTS <<  Axillary Web Syndrome? yes    Skin Assessment:  Stemmer's sign? no  Fibrosis (0 normal - 4 >severe): 0  Wound present?: no  Scar present: yes- healing mastectomy scar moderately restricted especially middle 1/3    Functional Capacity:  Gait assessment: WNL  Transfer status: indep  Ability to don/doff clothing/garments: indep  Upper quarter  "Sensation: Normal  Upper quarter Range of Motion: Abnormal, see below  Shldr OH flex: L 165, R 163 P!  Shldr OH abd: L 130, R 125 P!  Shldr ER: L T2, R T1  Shldr IR: L T7, R L1  Upper Quarter Strength: Normal  Shldr OH flex: 4+ DERRELL, R pain  Shldr OH abd: 4+ DERRELL, R pain  Shldr ER: 4+ bilaterally, L pain  Shldr IR: 4+ bilaterally, L pain     Precautions: h/o breast CA    POC expires Auth Status? (BOMN, approved, pending) Unit limit (Daily) Auth Start date Expiration date PT/OT + Visit Limit?    BOMN         Date of Service 5/8        Visits used 1        Visits remaining 99        Compression Rx         Foam breast compression pad  **                Manual Ther         Scar mobilization         MLD                                    Ther Ex         Chest wall stretches Butterfly stretch supine RUE 10\" x5 **       Upper quarter strengthening  **                         Ther Activity & Self Care                                             Pt Education         Edema differential dx 5'        Lymphatic A&P 5'        Compression options 5'                             "

## 2024-05-16 ENCOUNTER — OFFICE VISIT (OUTPATIENT)
Dept: URGENT CARE | Age: 84
End: 2024-05-16
Payer: MEDICARE

## 2024-05-16 VITALS
TEMPERATURE: 97.1 F | OXYGEN SATURATION: 96 % | DIASTOLIC BLOOD PRESSURE: 84 MMHG | SYSTOLIC BLOOD PRESSURE: 150 MMHG | RESPIRATION RATE: 18 BRPM | HEART RATE: 107 BPM

## 2024-05-16 DIAGNOSIS — J06.9 VIRAL URI WITH COUGH: Primary | ICD-10-CM

## 2024-05-16 DIAGNOSIS — J02.9 SORE THROAT: ICD-10-CM

## 2024-05-16 LAB — S PYO AG THROAT QL: NEGATIVE

## 2024-05-16 PROCEDURE — G0463 HOSPITAL OUTPT CLINIC VISIT: HCPCS

## 2024-05-16 PROCEDURE — 87880 STREP A ASSAY W/OPTIC: CPT

## 2024-05-16 PROCEDURE — 99213 OFFICE O/P EST LOW 20 MIN: CPT

## 2024-05-16 NOTE — PROGRESS NOTES
[facility]        NAME: Eleni Chew is a 83 y.o. female  : 1940    MRN: [id number]  DATE: May 16, 2024  TIME: 4:22 PM    Assessment and Plan   Viral URI with cough [J06.9]  1. Viral URI with cough        2. Sore throat  POCT rapid ANTIGEN strepA        Your symptoms are most likely related to a viral infection.  Viral infections can sometimes last for 10-14 days and symptoms are usually most intense at days 3 through 5. Your symptoms should begin to improve after 1 week. You may use over-the-counter cough and cold medication such as Mucinex for cough and chest congestion.  We recommend trialing warm salt water gargles, warm tea with honey, Chloraseptic spray or Cepacol cough drops as needed for sore throat.  It is also recommended to ensure adequate fluid intake. Flonase may help with congestion and post nasal drip. You may alternate Motrin and Tylenol as needed for fever and pain.  If your symptoms persist, please follow-up with your primary care provider for further evaluation.  If your symptoms worsen, or you develop new, worsening or concerning symptoms, please go to the ER for further workup and evaluation.      Patient Instructions     Continue with OTC cough and cold medication.   Drink plenty of fluids.   Follow up with PCP in 3-5 days.  Proceed to  ER if symptoms worsen.    If tests are performed, our office will contact you with results only if changes need to made to the care plan discussed with you at the visit. You can review your full results on [facility].    Chief Complaint     Chief Complaint   Patient presents with    Sore Throat    Cough     Sore throat and cough since .         History of Present Illness       83-year-old female presenting for evaluation of upper respiratory symptoms.  Patient states over the past 4 days she has been experiencing sore throat and cough.  She states her cough was initially dry, but now is productive in the mornings.  She has been  taking Coricidin, Mucinex and drinking warm tea and completing warm salt water gargles with mild relief.  Patient notes that her  is currently ill in the hospital with metapneumovirus.  Patient denies any fevers, chills, chest pain or shortness of breath.    Sore Throat   Associated symptoms include coughing. Pertinent negatives include no congestion, diarrhea, shortness of breath or vomiting.   Cough  Associated symptoms include a sore throat. Pertinent negatives include no chest pain, chills, fever or shortness of breath.       Review of Systems   Review of Systems   Constitutional:  Negative for chills and fever.   HENT:  Positive for sore throat. Negative for congestion.    Respiratory:  Positive for cough. Negative for shortness of breath.    Cardiovascular:  Negative for chest pain.   Gastrointestinal:  Negative for diarrhea, nausea and vomiting.   All other systems reviewed and are negative.        Current Medications       Current Outpatient Medications:     acetaminophen (TYLENOL) 325 mg tablet, Take 2 tablets (650 mg total) by mouth every 4 (four) hours as needed for mild pain, Disp: , Rfl:     amLODIPine (NORVASC) 5 mg tablet, Take 1 tablet (5 mg total) by mouth daily, Disp: 90 tablet, Rfl: 2    Ascorbic Acid (vitamin C) 1000 MG tablet, Take 1,000 mg by mouth daily Do not start before March 1, 2024., Disp: , Rfl:     Cholecalciferol (VITAMIN D PO), Take 1,000 Units by mouth daily Do not start before March 1, 2024., Disp: , Rfl:     oxyCODONE-acetaminophen (Percocet) 5-325 mg per tablet, Take 1 tablet by mouth every 6 (six) hours as needed for moderate pain for up to 10 doses Max Daily Amount: 4 tablets (Patient not taking: Reported on 4/12/2024), Disp: 10 tablet, Rfl: 0    Current Allergies     Allergies as of 05/16/2024 - Reviewed 05/16/2024   Allergen Reaction Noted    Hydrochlorothiazide Other (See Comments) 07/15/2022    Lisinopril  10/27/2020    Losartan Other (See Comments) 07/15/2022     Rosuvastatin Nausea Only 08/06/2021            The following portions of the patient's history were reviewed and updated as appropriate: allergies, current medications, past family history, past medical history, past social history, past surgical history and problem list.     Past Medical History:   Diagnosis Date    Asthma     as a child    Breast cancer (HCC) 06/13/2019    Chronic kidney disease 2022    Esophagitis     GERD (gastroesophageal reflux disease)     Hypertension        Past Surgical History:   Procedure Laterality Date    BREAST LUMPECTOMY Left 1978    benign    DENTAL SURGERY      ESOPHAGOGASTRODUODENOSCOPY N/A 3/19/2018    Procedure: ESOPHAGOGASTRODUODENOSCOPY (EGD);  Surgeon: Farshad Jama MD;  [facility];  Service: Gastroenterology    LYMPH NODE BIOPSY Right 3/28/2024    Procedure: LYMPHATIC MAPPING WITH BLUE AND RADIOACTIVE DYES, SENTINEL LYMPH NODE BIOPSY (INJECT AT 1000 BY DR THOMAS IN THE OR);  Surgeon: Ismael Thomas MD;  [facility];  Service: Surgical Oncology    MAMMO STEREOTACTIC BREAST BIOPSY LEFT (ALL INC) Left 6/13/2019    MASTECTOMY Left 07/02/2019    MASTECTOMY W/ SENTINEL NODE BIOPSY Left 7/2/2019    Procedure: BREAST MASTECTOMY WITH SENTINEL LYMPH NODE BIOPSY, LYMPHATIC MAPPING WITH BLUE DYE AND RADIOACTIVE DYE (INJECT AT 0900 BY DR THOMAS IN THE OR);  Surgeon: Ismael Thomas MD;  [facility];  Service: Surgical Oncology    SIMPLE MASTECTOMY Right 3/28/2024    Procedure: BREAST MASTECTOMY;  Surgeon: Ismael Thomas MD;  [facility];  Service: Surgical Oncology    TONSILLECTOMY      US GUIDANCE BREAST BIOPSY RIGHT EACH ADDITIONAL Right 2/28/2024    US GUIDED BREAST BIOPSY LEFT COMPLETE Left 5/23/2019    US GUIDED BREAST BIOPSY RIGHT COMPLETE Right 2/28/2024       Family History   Problem Relation Age of Onset    Alzheimer's disease Mother     Dementia Mother     No Known Problems Father     No Known Problems Sister     No Known Problems Sister     Multiple  sclerosis Sister     Multiple sclerosis Sister     No Known Problems Sister     No Known Problems Sister     Multiple sclerosis Daughter     No Known Problems Maternal Aunt     No Known Problems Maternal Aunt     No Known Problems Maternal Aunt     No Known Problems Maternal Aunt     No Known Problems Maternal Aunt     No Known Problems Maternal Aunt     No Known Problems Maternal Grandmother     No Known Problems Maternal Grandfather     No Known Problems Paternal Grandmother     No Known Problems Paternal Grandfather          Medications have been verified.        Objective   /84   Pulse (!) 107   Temp (!) 97.1 °F (36.2 °C) (Temporal)   Resp 18   SpO2 96%        Physical Exam     Physical Exam  Vitals and nursing note reviewed.   Constitutional:       General: She is not in acute distress.     Appearance: Normal appearance. She is normal weight. She is not ill-appearing, toxic-appearing or diaphoretic.   HENT:      Head: Normocephalic and atraumatic.      Right Ear: Tympanic membrane normal.      Left Ear: Tympanic membrane normal.      Nose: Congestion present. No rhinorrhea.      Mouth/Throat:      Mouth: Mucous membranes are moist.      Pharynx: Oropharynx is clear. Posterior oropharyngeal erythema present. No oropharyngeal exudate.   Eyes:      General:         Right eye: No discharge.         Left eye: No discharge.   Cardiovascular:      Rate and Rhythm: Normal rate and regular rhythm.      Pulses: Normal pulses.      Heart sounds: Murmur heard.      No friction rub. No gallop.   Pulmonary:      Effort: Pulmonary effort is normal. No respiratory distress.      Breath sounds: Normal breath sounds. No stridor. No wheezing, rhonchi or rales.   Chest:      Chest wall: No tenderness.   Abdominal:      General: Bowel sounds are normal.      Palpations: Abdomen is soft.      Tenderness: There is no abdominal tenderness.   Skin:     General: Skin is warm and dry.   Neurological:      Mental Status: She is  alert.   Psychiatric:         Mood and Affect: Mood normal.         Behavior: Behavior normal.

## 2024-05-17 ENCOUNTER — TELEPHONE (OUTPATIENT)
Dept: OBGYN CLINIC | Facility: OTHER | Age: 84
End: 2024-05-17

## 2024-05-22 ENCOUNTER — OFFICE VISIT (OUTPATIENT)
Dept: PHYSICAL THERAPY | Facility: REHABILITATION | Age: 84
End: 2024-05-22
Payer: MEDICARE

## 2024-05-22 DIAGNOSIS — Z91.89 AT RISK FOR LYMPHEDEMA: ICD-10-CM

## 2024-05-22 DIAGNOSIS — Z90.11 STATUS POST RIGHT MASTECTOMY: Primary | ICD-10-CM

## 2024-05-22 DIAGNOSIS — D05.11 DUCTAL CARCINOMA IN SITU (DCIS) OF RIGHT BREAST: ICD-10-CM

## 2024-05-22 PROCEDURE — 97110 THERAPEUTIC EXERCISES: CPT | Performed by: PHYSICAL THERAPIST

## 2024-05-22 PROCEDURE — 97140 MANUAL THERAPY 1/> REGIONS: CPT | Performed by: PHYSICAL THERAPIST

## 2024-05-22 NOTE — PROGRESS NOTES
"Daily Note     Today's date: 2024  Patient name: Eleni Chew  : 1940  MRN: [id number]  Referring provider: Lexus Hines CRNP  Dx:   Encounter Diagnosis     ICD-10-CM    1. Status post right mastectomy  Z90.11       2. At risk for lymphedema  Z91.89       3. 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    Objective: See treatment diary below    Assessment: Tolerated treatment well. Initiated indirect scar mobilization and chest wall STM and patient is asymptomatic. Initiated SABC program with gentle upper quarter and lower quarter resistance training asymptomatically today. Handout provided.    Plan: Continue per plan of care.  Progress exercises slowly.     Precautions: h/o breast CA    Access Code: [email or web address]  URL: [email or web address]  Date: 2024  Prepared by: Tara Steimling    Exercises  - Sit to Stand with Hands on Knees  - 3 x weekly - 20 reps  - Single Leg Heel Raise with Counter Support  - 3 x weekly - 10 reps  - Standing Hip Abduction  - 3 x weekly - 10 reps  - Seated Bicep Curls Supinated with Dumbbells  - 3 x weekly - 10 reps  - Seated Overhead Press with Dumbbells  - 3 x weekly - 10 reps  - Seated Overhead Elbow Extension with Dumbbells with PLB  - 3 x weekly - 10 reps  - Seated Shoulder Abduction with Dumbbells - Thumbs Up  - 3 x weekly - 10 reps    POC expires Auth Status? (BOMN, approved, pending) Unit limit (Daily) Auth Start date Expiration date PT/OT + Visit Limit?    BOMN         Date of Service        Visits used 1 2       Visits remaining 99 99       Compression Rx         Foam breast compression pad  **                Manual Ther         Scar mobilization  20'       MLD  10'                                  Ther Ex         Chest wall stretches Butterfly stretch supine RUE 10\" x5 HEP       Upper quarter strengthening  UBE 2'/2'  Bicep curl 5# x10  OH press 2# x10  Triceps ext 5# x10  Shoulder abd " 2# x10       Lower quarter strengthening  STS x18  SL HR x10  Standing hip abd x10                Ther Activity & Self Care                                             Pt Education         Edema differential dx 5'        Lymphatic A&P 5'        Compression options 5'

## 2024-05-29 ENCOUNTER — OFFICE VISIT (OUTPATIENT)
Dept: PHYSICAL THERAPY | Facility: REHABILITATION | Age: 84
End: 2024-05-29
Payer: MEDICARE

## 2024-05-29 DIAGNOSIS — Z91.89 AT RISK FOR LYMPHEDEMA: ICD-10-CM

## 2024-05-29 DIAGNOSIS — D05.11 DUCTAL CARCINOMA IN SITU (DCIS) OF RIGHT BREAST: ICD-10-CM

## 2024-05-29 DIAGNOSIS — Z90.11 STATUS POST RIGHT MASTECTOMY: Primary | ICD-10-CM

## 2024-05-29 PROCEDURE — 97110 THERAPEUTIC EXERCISES: CPT | Performed by: PHYSICAL THERAPIST

## 2024-05-29 PROCEDURE — 97140 MANUAL THERAPY 1/> REGIONS: CPT | Performed by: PHYSICAL THERAPIST

## 2024-05-29 NOTE — PROGRESS NOTES
Daily Note     Today's date: 2024  Patient name: Eleni Chew  : 1940  MRN: [id number]  Referring provider: Lexus Hines CRNP  Dx:   Encounter Diagnosis     ICD-10-CM    1. Status post right mastectomy  Z90.11       2. 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 At risk for lymphedema  Z91.89         Subjective: no new complaints.    Objective: See treatment diary below    Assessment: Tolerated treatment well. Continued with indirect and added direct scar mobilization and chest wall STM and patient is asymptomatic. Continued with SABC program with gentle upper quarter and lower quarter resistance training asymptomatically today- progressed weights by 1lb, discontinued STS due to knee pain, and added 1 balance and 2 UE exercises. Updated HEP handout provided.    Plan: Continue per plan of care.  Progress balance and strengthening exercises .     Precautions: h/o breast CA    Access Code: [email or web address]  URL: [email or web address]  Date: 2024  Prepared by: Tara Steimling    Exercises  - Sit to Stand with Hands on Knees  - 3 x weekly - 20 reps  - Single Leg Heel Raise with Counter Support  - 3 x weekly - 10 reps  - Standing Hip Abduction  - 3 x weekly - 10 reps  - Seated Bicep Curls Supinated with Dumbbells  - 3 x weekly - 10 reps  - Seated Overhead Press with Dumbbells  - 3 x weekly - 10 reps  - Seated Overhead Elbow Extension with Dumbbells with PLB  - 3 x weekly - 10 reps  - Seated Shoulder Abduction with Dumbbells - Thumbs Up  - 3 x weekly - 10 reps  - Push Up on Table  - 3 x weekly - 10 reps  - Standing Shoulder External Rotation with Resistance  - 3 x weekly - 20 reps  - Tandem Stance  - 1 x daily - 60 hold  - Tandem Stance with Head Rotation  - 1 x daily    POC expires Auth Status? (BOMN, approved, pending) Unit limit (Daily) Auth Start date Expiration date PT/OT + Visit Limit?    BOMN         Date of Service       Visits used 1 2 3     "  Visits remaining 99 99 99      Compression Rx         Foam breast compression pad  **                Manual Ther         Scar mobilization  20' 15'      MLD  10' 10'                                 Ther Ex         Chest wall stretches Butterfly stretch supine RUE 10\" x5 HEP Doorway pec stretch 10\" x3  Wall flexion stretch 10\" x3      Upper quarter strengthening  UBE 2'/2'  Bicep curl 5# x10  OH press 2# x10  Triceps ext 5# x10  Shoulder abd 2# x10 UBE 3'/2'    Bicep curl 6# x20  OH press 3# x15  No Money ER peach x20  Shoulder abd 3# x15  Incline pushup x10          Lower quarter strengthening  STS x18  SL HR x10  Standing hip abd x10 SL HR x20  Standing hip abd x20      Balance training   Tandem stance 10\" x3 each  W head turns (HEP)      Ther Activity & Self Care                                             Pt Education         Edema differential dx 5'        Lymphatic A&P 5'        Compression options 5'                               "

## 2024-06-05 ENCOUNTER — OFFICE VISIT (OUTPATIENT)
Dept: PHYSICAL THERAPY | Facility: REHABILITATION | Age: 84
End: 2024-06-05
Payer: MEDICARE

## 2024-06-05 DIAGNOSIS — Z91.89 AT RISK FOR LYMPHEDEMA: ICD-10-CM

## 2024-06-05 DIAGNOSIS — D05.11 DUCTAL CARCINOMA IN SITU (DCIS) OF RIGHT BREAST: ICD-10-CM

## 2024-06-05 DIAGNOSIS — Z90.11 STATUS POST RIGHT MASTECTOMY: Primary | ICD-10-CM

## 2024-06-05 PROCEDURE — 97140 MANUAL THERAPY 1/> REGIONS: CPT | Performed by: PHYSICAL THERAPIST

## 2024-06-05 PROCEDURE — 97110 THERAPEUTIC EXERCISES: CPT | Performed by: PHYSICAL THERAPIST

## 2024-06-05 NOTE — PROGRESS NOTES
"Daily Note     Today's date: 2024  Patient name: Eleni Chew  : 1940  MRN: [id number]  Referring provider: Lexus Hines CRNP  Dx:   Encounter Diagnosis     ICD-10-CM    1. Status post right mastectomy  Z90.11       2. 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 At risk for lymphedema  Z91.89         Subjective: no new complaints. Bra fitting appt tomorrow- encouraged use with or without prosthesis for supporting R lateral thoracic soft tissue.    Objective: See treatment diary below    Assessment: Tolerated treatment well. Lymphedema volumetrics taken-- arms are within 10mL in size per girth measurements, and educated patient on low risk for lymphedema based on cancer treatment history. Defer prophylactic compression sleeve/gauntlet due to lack of evidence on efficacy. Scar mobilization continues to be painfree and scar restrictions are improving mildly. No chest wall edema present, but areas of fat necrosis present and unchanging.    Plan: Continue per plan of care.  Next visit, provide \"NLN healthy habits for patients at risk for lymphedema\", continue progressing balance and strengthening exercises.  D/c in 2-4 visits.     Precautions: h/o breast CA    Access Code: [email or web address]  URL: [email or web address]  Date: 2024  Prepared by: Tara Daniel    Exercises  - Sit to Stand with Hands on Knees  - 3 x weekly - 20 reps  - Single Leg Heel Raise with Counter Support  - 3 x weekly - 10 reps  - Standing Hip Abduction  - 3 x weekly - 10 reps  - Seated Bicep Curls Supinated with Dumbbells  - 3 x weekly - 10 reps  - Seated Overhead Press with Dumbbells  - 3 x weekly - 10 reps  - Seated Overhead Elbow Extension with Dumbbells with PLB  - 3 x weekly - 10 reps  - Seated Shoulder Abduction with Dumbbells - Thumbs Up  - 3 x weekly - 10 reps  - Push Up on Table  - 3 x weekly - 10 reps  - Standing Shoulder External Rotation with Resistance  - 3 x weekly - 20 reps  - " "Tandem Stance  - 1 x daily - 60 hold  - Tandem Stance with Head Rotation  - 1 x daily    POC expires Auth Status? (BOMN, approved, pending) Unit limit (Daily) Auth Start date Expiration date PT/OT + Visit Limit?    BOMN         Date of Service 5/8 5/22 5/29 6/5     Visits used 1 2 3 4     Visits remaining 99 99 99 99     Compression Rx         Foam breast compression pad  **                Manual Ther         Scar mobilization  20' 15' 30'     MLD  10' 10' 15'                                Ther Ex         Chest wall stretches Butterfly stretch supine RUE 10\" x5 HEP Doorway pec stretch 10\" x3  Wall flexion stretch 10\" x3 Doorway pec stretch 10\" x3  Wall flexion stretch 10\" x3     Upper quarter strengthening  UBE 2'/2'  Bicep curl 5# x10  OH press 2# x10  Triceps ext 5# x10  Shoulder abd 2# x10 UBE 3'/2'    Bicep curl 6# x20  OH press 3# x15  No Money ER peach x20  Shoulder abd 3# x15  Incline pushup x10     UBE 3'/2'     Lower quarter strengthening  STS x18  SL HR x10  Standing hip abd x10 SL HR x20  Standing hip abd x20      Balance training   Tandem stance 10\" x3 each  W head turns (HEP)      Ther Activity & Self Care                                             Pt Education         Edema differential dx 5'        Lymphatic A&P 5'        Compression options 5'                               "

## 2024-06-06 ENCOUNTER — CLINICAL SUPPORT (OUTPATIENT)
Dept: OBGYN CLINIC | Facility: OTHER | Age: 84
End: 2024-06-06

## 2024-06-06 DIAGNOSIS — D05.11 DUCTAL CARCINOMA IN SITU (DCIS) OF RIGHT BREAST: Primary | ICD-10-CM

## 2024-06-06 NOTE — PROGRESS NOTES
Mastectomy Bra Fitting Order Details    Eleni Chew  1940  [id number]    Reason For Visit  Compression bra and prosthesis     Precautions   History of breast cancer     Subjective  Left mastectomy in 2019 without reconstruction. R mastectomy March 28 2024 with no plans for reconstruction. States that she has a prosthesis for the left side when necessary but does not wear daily. States that she is interested in a prosthesis for both sides but remains concerned about the weight of a prosthesis.     Surgery Type: Mastectomy    Lymph node removal yes    Date of surgery 03/28/2024    Surgical side right    Objective  Eleni has a well healed chest wall , she is being treated in PT for lymphedema and has discussed with her therapist a compression bra. Eleni was educated on the lightest prosthesis available however the weight of the prosthesis remains a concern. Delivered cotton inserts that are traditionally provided with post op garments to allow for her to achieve shape without additional weight. Eleni is in agreement. Will contact primary PT to inform     Assessment  Band 19.5 x 2 = 39 + 3 = 42  Trial of Joseline and Rupinder.     Plan  Shio to home. Was educated on wear and care of her products    Order Details   L mastectomy 2019, R mastectomy March 2024  Joseline large x 2 nude  Rupinder white 40/42 x 1 - received on 6/6/24 + 2 sm, 2 med cotton inserts

## 2024-06-19 ENCOUNTER — OFFICE VISIT (OUTPATIENT)
Dept: PHYSICAL THERAPY | Facility: REHABILITATION | Age: 84
End: 2024-06-19
Payer: MEDICARE

## 2024-06-19 DIAGNOSIS — Z91.89 AT RISK FOR LYMPHEDEMA: ICD-10-CM

## 2024-06-19 DIAGNOSIS — D05.11 DUCTAL CARCINOMA IN SITU (DCIS) OF RIGHT BREAST: ICD-10-CM

## 2024-06-19 DIAGNOSIS — Z90.11 STATUS POST RIGHT MASTECTOMY: Primary | ICD-10-CM

## 2024-06-19 PROCEDURE — 97140 MANUAL THERAPY 1/> REGIONS: CPT | Performed by: PHYSICAL THERAPIST

## 2024-06-19 PROCEDURE — 97110 THERAPEUTIC EXERCISES: CPT | Performed by: PHYSICAL THERAPIST

## 2024-06-19 NOTE — PROGRESS NOTES
Daily Note     Today's date: 2024  Patient name: Eleni Chew  : 1940  MRN: [id number]  Referring provider: Lexus Hines CRNP  Dx:   Encounter Diagnosis     ICD-10-CM    1. Status post right mastectomy  Z90.11       2. 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 At risk for lymphedema  Z91.89           Subjective: no new complaints. Bra fitting appt tomorrow- encouraged use with or without prosthesis for supporting R lateral thoracic soft tissue.    Objective: See treatment diary below    Assessment: Tolerated treatment well. Added MFR to axilla on R for mild cording. Progressed strengthening resistance/intensity. NV, trial cupping of R chest wall.    Plan: Continue per plan of care.       Precautions: h/o breast CA    Access Code: [email or web address]  URL: [email or web address]  Date: 2024  Prepared by: Tara Steimling    Exercises  - Sit to Stand with Hands on Knees  - 3 x weekly - 20 reps  - Single Leg Heel Raise with Counter Support  - 3 x weekly - 10 reps  - Standing Hip Abduction  - 3 x weekly - 10 reps  - Seated Bicep Curls Supinated with Dumbbells  - 3 x weekly - 10 reps  - Seated Overhead Press with Dumbbells  - 3 x weekly - 10 reps  - Seated Overhead Elbow Extension with Dumbbells with PLB  - 3 x weekly - 10 reps  - Seated Shoulder Abduction with Dumbbells - Thumbs Up  - 3 x weekly - 10 reps  - Push Up on Table  - 3 x weekly - 10 reps  - Standing Shoulder External Rotation with Resistance  - 3 x weekly - 20 reps  - Tandem Stance  - 1 x daily - 60 hold  - Tandem Stance with Head Rotation  - 1 x daily    POC expires Auth Status? (BOMN, approved, pending) Unit limit (Daily) Auth Start date Expiration date PT/OT + Visit Limit?    BOMN         Date of Service     Visits used 1 2 3 4 5    Visits remaining 99 99 99 99 99    Compression Rx         Foam breast compression pad  **                Manual Ther         Scar mobilization  20' 15'  "30' 30'    MLD  10' 10' 15'                                Ther Ex         Chest wall stretches Butterfly stretch supine RUE 10\" x5 HEP Doorway pec stretch 10\" x3  Wall flexion stretch 10\" x3 Doorway pec stretch 10\" x3  Wall flexion stretch 10\" x3     Upper quarter strengthening  UBE 2'/2'  Bicep curl 5# x10  OH press 2# x10  Triceps ext 5# x10  Shoulder abd 2# x10 UBE 3'/2'    Bicep curl 6# x20  OH press 3# x15  No Money ER peach x20  Shoulder abd 3# x15  Incline pushup x10     UBE 3'/2' Bicep curl 7# 2x10  OH press 5# x10  No Money ER peach x20  Shoulder abd 5# x10  Incline pushup x10    Lower quarter strengthening  STS x18  SL HR x10  Standing hip abd x10 SL HR x20  Standing hip abd x20  SL HR 7# x20  Standing hip abd 2x10 orange TB    Balance training   Tandem stance 10\" x3 each  W head turns (HEP)      Ther Activity & Self Care                                             Pt Education         Edema differential dx 5'        Lymphatic A&P 5'        Compression options 5'                               "

## 2024-06-23 LAB
DME PARACHUTE DELIVERY DATE ACTUAL: NORMAL
DME PARACHUTE DELIVERY DATE REQUESTED: NORMAL
DME PARACHUTE ITEM DESCRIPTION: NORMAL
DME PARACHUTE ITEM DESCRIPTION: NORMAL
DME PARACHUTE ORDER STATUS: NORMAL
DME PARACHUTE SUPPLIER NAME: NORMAL
DME PARACHUTE SUPPLIER PHONE: NORMAL

## 2024-06-24 ENCOUNTER — TELEPHONE (OUTPATIENT)
Dept: OBGYN CLINIC | Facility: OTHER | Age: 84
End: 2024-06-24

## 2024-06-24 NOTE — TELEPHONE ENCOUNTER
Spoke to Eleni regarding mastectomy bras. Will contact us in the future if she would like to order more

## 2024-07-03 ENCOUNTER — OFFICE VISIT (OUTPATIENT)
Dept: PHYSICAL THERAPY | Facility: REHABILITATION | Age: 84
End: 2024-07-03
Payer: MEDICARE

## 2024-07-03 DIAGNOSIS — Z90.11 STATUS POST RIGHT MASTECTOMY: Primary | ICD-10-CM

## 2024-07-03 DIAGNOSIS — D05.11 DUCTAL CARCINOMA IN SITU (DCIS) OF RIGHT BREAST: ICD-10-CM

## 2024-07-03 DIAGNOSIS — Z91.89 AT RISK FOR LYMPHEDEMA: ICD-10-CM

## 2024-07-03 PROCEDURE — 97110 THERAPEUTIC EXERCISES: CPT | Performed by: PHYSICAL THERAPIST

## 2024-07-03 PROCEDURE — 97140 MANUAL THERAPY 1/> REGIONS: CPT | Performed by: PHYSICAL THERAPIST

## 2024-07-03 NOTE — PROGRESS NOTES
Daily Note     Today's date: 7/3/2024  Patient name: Eleni Chew  : 1940  MRN: [id number]  Referring provider: Lexus Hines CRNP  Dx:   Encounter Diagnosis     ICD-10-CM    1. Status post right mastectomy  Z90.11       2. 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 At risk for lymphedema  Z91.89           Subjective: no new complaints. General bodily soreness from increased step count and doing light weight yardwork this week.     Objective: See treatment diary below    Assessment: Tolerated treatment well. Added fascial decongestion to chest wall without symptoms today.     Plan: Continue per plan of care.       Precautions: h/o breast CA    Access Code: [email or web address]  URL: [email or web address]  Date: 2024  Prepared by: Tara Steimling    Exercises  - Sit to Stand with Hands on Knees  - 3 x weekly - 20 reps  - Single Leg Heel Raise with Counter Support  - 3 x weekly - 10 reps  - Standing Hip Abduction  - 3 x weekly - 10 reps  - Seated Bicep Curls Supinated with Dumbbells  - 3 x weekly - 10 reps  - Seated Overhead Press with Dumbbells  - 3 x weekly - 10 reps  - Seated Overhead Elbow Extension with Dumbbells with PLB  - 3 x weekly - 10 reps  - Seated Shoulder Abduction with Dumbbells - Thumbs Up  - 3 x weekly - 10 reps  - Push Up on Table  - 3 x weekly - 10 reps  - Standing Shoulder External Rotation with Resistance  - 3 x weekly - 20 reps  - Tandem Stance  - 1 x daily - 60 hold  - Tandem Stance with Head Rotation  - 1 x daily    POC expires Auth Status? (BOMN, approved, pending) Unit limit (Daily) Auth Start date Expiration date PT/OT + Visit Limit?    BOMN         Date of Service 5/8 5/22 5/29 6/5 6/19 7/3   Visits used 1 2 3 4 5 6   Visits remaining 99 99 99 99 99 99   Compression Rx         Foam breast compression pad  **                Manual Ther         Scar mobilization  20' 15' 30' 30' 30' w cupping   MLD  10' 10' 15'                                Ther  "Ex         Chest wall stretches Butterfly stretch supine RUE 10\" x5 HEP Doorway pec stretch 10\" x3  Wall flexion stretch 10\" x3 Doorway pec stretch 10\" x3  Wall flexion stretch 10\" x3     Upper quarter strengthening  UBE 2'/2'  Bicep curl 5# x10  OH press 2# x10  Triceps ext 5# x10  Shoulder abd 2# x10 UBE 3'/2'    Bicep curl 6# x20  OH press 3# x15  No Money ER peach x20  Shoulder abd 3# x15  Incline pushup x10     UBE 3'/2' Bicep curl 7# 2x10  OH press 5# x10  No Money ER peach x20  Shoulder abd 5# x10  Incline pushup x10 UBE 3'/3'   Lower quarter strengthening  STS x18  SL HR x10  Standing hip abd x10 SL HR x20  Standing hip abd x20  SL HR 7# x20  Standing hip abd 2x10 orange TB Fwd step up w march 6\" x10 ea    Lateral step up and over 6\" x20   Balance training   Tandem stance 10\" x3 each  W head turns (HEP)   Tandem walk   Ther Activity & Self Care                                             Pt Education         Edema differential dx 5'        Lymphatic A&P 5'        Compression options 5'                               "

## 2024-07-10 ENCOUNTER — OFFICE VISIT (OUTPATIENT)
Dept: PHYSICAL THERAPY | Facility: REHABILITATION | Age: 84
End: 2024-07-10
Payer: MEDICARE

## 2024-07-10 DIAGNOSIS — D05.11 DUCTAL CARCINOMA IN SITU (DCIS) OF RIGHT BREAST: ICD-10-CM

## 2024-07-10 DIAGNOSIS — Z91.89 AT RISK FOR LYMPHEDEMA: ICD-10-CM

## 2024-07-10 DIAGNOSIS — Z90.11 STATUS POST RIGHT MASTECTOMY: Primary | ICD-10-CM

## 2024-07-10 PROCEDURE — 97140 MANUAL THERAPY 1/> REGIONS: CPT | Performed by: PHYSICAL THERAPIST

## 2024-07-10 NOTE — PROGRESS NOTES
"Daily Note     Today's date: 7/10/2024  Patient name: Eleni Chew  : 1940  MRN: [id number]  Referring provider: Lexus Hines CRNP  Dx:   Encounter Diagnosis     ICD-10-CM    1. Status post right mastectomy  Z90.11       2. 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 At risk for lymphedema  Z91.89           Subjective: \"I feel 100% better after the exercises and manual therapy\"    Objective: See treatment diary below    Assessment: Tolerated treatment well. Continued fascial decongestion to chest wall without symptoms today.     Goals:  -Patient will demonstrate full and painfree shoulder flexion and abduction in 8 weeks. MET  -Patient will demonstrate 50% improvement in R chest wall scar mobility in 8 weeks. MET  - No symptoms of axillary web syndrome in 8 weeks. MET    Plan: discharge     Precautions: h/o breast CA    Access Code: [email or web address]  URL: [email or web address]  Date: 2024  Prepared by: Tara Booimbianka    Exercises  - Sit to Stand with Hands on Knees  - 3 x weekly - 20 reps  - Single Leg Heel Raise with Counter Support  - 3 x weekly - 10 reps  - Standing Hip Abduction  - 3 x weekly - 10 reps  - Seated Bicep Curls Supinated with Dumbbells  - 3 x weekly - 10 reps  - Seated Overhead Press with Dumbbells  - 3 x weekly - 10 reps  - Seated Overhead Elbow Extension with Dumbbells with PLB  - 3 x weekly - 10 reps  - Seated Shoulder Abduction with Dumbbells - Thumbs Up  - 3 x weekly - 10 reps  - Push Up on Table  - 3 x weekly - 10 reps  - Standing Shoulder External Rotation with Resistance  - 3 x weekly - 20 reps  - Tandem Stance  - 1 x daily - 60 hold  - Tandem Stance with Head Rotation  - 1 x daily    POC expires Auth Status? (BOMN, approved, pending) Unit limit (Daily) Auth Start date Expiration date PT/OT + Visit Limit?    BOMN         Date of Service 6/5 6/19 7/3 7/10    Visits used 4 5 6 7    Visits remaining 99 99 99 99    Compression Rx        Foam " "breast compression pad                Manual Ther        Scar mobilization 30' 30' 30' w cupping 40' w cupping    MLD 15'                               Ther Ex        Chest wall stretches Doorway pec stretch 10\" x3  Wall flexion stretch 10\" x3       Upper quarter strengthening UBE 3'/2' Bicep curl 7# 2x10  OH press 5# x10  No Money ER peach x20  Shoulder abd 5# x10  Incline pushup x10 UBE 3'/3'     Lower quarter strengthening  SL HR 7# x20  Standing hip abd 2x10 orange TB Fwd step up w march 6\" x10 ea    Lateral step up and over 6\" x20     Balance training   Tandem walk     Ther Activity & Self Care                                        Pt Education        Edema differential dx        Lymphatic A&P        Compression options                             "

## 2024-09-25 ENCOUNTER — TELEPHONE (OUTPATIENT)
Dept: SURGICAL ONCOLOGY | Facility: CLINIC | Age: 84
End: 2024-09-25

## 2024-09-25 NOTE — TELEPHONE ENCOUNTER
Called the patient to reschedule her upcoming follow up appointment with Dr. Thomas as he will not be available on 10/09. There was no answer so a message was left with the hope line phone number provided.     When the patient returns the call, she should be scheduled for an OVL (30 MINUTES) with Dr. Thomas tomorrow (9/26) or 9/30.

## 2024-09-27 NOTE — TELEPHONE ENCOUNTER
Called the patient for the second time to discuss the same but again, there was no answer. Another message was left with the hope line number provided.

## 2024-10-02 NOTE — TELEPHONE ENCOUNTER
Called the patient for the third time to discuss the same but again there was no answer. Another message was left with the hope line number provided.    When the patient returns the call, she should be rescheduled with one of the nurse practitioners for her surgical oncology follow up.

## 2024-10-16 ENCOUNTER — OFFICE VISIT (OUTPATIENT)
Dept: SURGICAL ONCOLOGY | Facility: CLINIC | Age: 84
End: 2024-10-16
Payer: MEDICARE

## 2024-10-16 VITALS
BODY MASS INDEX: 28.61 KG/M2 | OXYGEN SATURATION: 98 % | DIASTOLIC BLOOD PRESSURE: 80 MMHG | TEMPERATURE: 97.9 F | HEART RATE: 75 BPM | WEIGHT: 178 LBS | SYSTOLIC BLOOD PRESSURE: 142 MMHG | HEIGHT: 66 IN

## 2024-10-16 DIAGNOSIS — Z08 ENCOUNTER FOR FOLLOW-UP EXAMINATION AFTER COMPLETED TREATMENT FOR MALIGNANT NEOPLASM: Primary | ICD-10-CM

## 2024-10-16 DIAGNOSIS — Z86.000 HISTORY OF DUCTAL CARCINOMA IN SITU (DCIS) OF LEFT BREAST: ICD-10-CM

## 2024-10-16 DIAGNOSIS — Z86.000 HISTORY OF DUCTAL CARCINOMA IN SITU (DCIS) OF RIGHT BREAST: ICD-10-CM

## 2024-10-16 PROCEDURE — G2211 COMPLEX E/M VISIT ADD ON: HCPCS

## 2024-10-16 PROCEDURE — 99213 OFFICE O/P EST LOW 20 MIN: CPT

## 2024-10-16 NOTE — PROGRESS NOTES
Surgical Oncology Follow Up       [facility] SURGICAL ONCOLOGY [place]  [address]  [place] [address]    Eleni Chew  1940  [id number]  [facility]COL[facility] [place]  [address]  [place] [address]    1. Encounter for follow-up examination after completed treatment for malignant neoplasm  2. History of ductal carcinoma in situ (DCIS) of left breast  Assessment & Plan:  No evidence of disease recurrence. I will see her again in 1 year.  3. History of ductal carcinoma in situ (DCIS) of right breast  Assessment & Plan:  No evidence of disease recurrence. I will see her again in 1 year.         Chief Complaint   Patient presents with    Follow-up       Return in about 1 year (around 10/16/2025) for Office Visit.      Oncology History   History of ductal carcinoma in situ (DCIS) of left breast   5/23/2019 Biopsy    Left breast ultrasound-guided biopsy  10 o'clock, 4 cm from nipple  Ductal Carcinoma in Situ  Grade 2    NC 90     6/13/2019 Biopsy    Left breast biopsy of additional suspicious area  Lower inner quadrant, posterior portion, 8 cm from nipple  Single focus of atypical lobular hyperplasia     7/2/2019 Surgery    Left breast mastectomy with sentinel lymph node biopsy  Ductal carcinoma in situ  4.5 cm  Grade 2  Margins negative  0/1 Lymph nodes  Stage 0     7/10/2019 -  Cancer Staged    Staging form: Breast, AJCC 8th Edition  - Pathologic: Stage 0 (pTis (DCIS), pN0(sn), cM0, G2, ER+, NC+, HER2: Not Assessed) - Signed by Ismael Thomas MD on 4/8/2024  Neoadjuvant therapy: No  Method of lymph node assessment: [place] lymph node biopsy  Histologic grading system: 3 grade system  Laterality: Left  Tumor size (mm): 45       History of ductal carcinoma in situ (DCIS) of right breast   2/28/2024 Biopsy    Right breast ultrasound-guided biopsy  A. 12 o'clock, 4 cm from nipple (wing)  Ductal  carcinoma in situ  Grade 1  ER 90, MO 90    B. 12 o'clock, retroareolar (ribbon)  Lobular carcinoma in situ    Concordant. Malignancy appears multifocal; masses to include DCIS and LCIS cover an area of 5 cm. US right axilla negative.      3/28/2024 Surgery    Right mastectomy with SLN biopsy  Ductal carcinoma in situ   Grade 2  7 cm  Margins negative  0/1 Lymph node  Stage 0     3/28/2024 -  Cancer Staged    Staging form: Breast, AJCC 8th Edition  - Pathologic stage from 3/28/2024: Stage 0 (pTis (DCIS), pN0(sn), cM0, ER+, MO+, HER2: Unknown) - Signed by Ismael Thomas MD on 4/8/2024  Stage prefix: Initial diagnosis  Method of lymph node assessment: [place] lymph node biopsy  Nuclear grade: G2  Multigene prognostic tests performed: None             History of Present Illness: This is an 83 y/o female who returns to the office today in follow-up for her history of bilateral DICS.  She is currently JOEY at over 5 years from left breast mastectomy, and 7 months s/p right breast mastectomy.  She denies any new lumps or skin changes on her chest wall.  She is not experiencing any other symptoms.       Review of Systems   Constitutional:  Negative for activity change, appetite change, fatigue and unexpected weight change.   HENT: Negative.     Respiratory: Negative.     Cardiovascular: Negative.    Gastrointestinal: Negative.  Negative for abdominal pain, nausea and vomiting.   Musculoskeletal: Negative.    Skin: Negative.  Negative for color change and wound.   Neurological: Negative.  Negative for dizziness and headaches.   Hematological: Negative.  Negative for adenopathy.   Psychiatric/Behavioral: Negative.             Patient Active Problem List   Diagnosis    Hypertension    GERD (gastroesophageal reflux disease)    Esophagitis    Hiatal hernia    History of ductal carcinoma in situ (DCIS) of left breast    Asthma    Body mass index 30.0-30.9, adult    Low back pain due to bilateral sciatica    Essential hypertension     Gastroesophageal reflux disease without esophagitis    Dry skin    Seasonal allergies    Mixed hyperlipidemia    Essential hypertension, benign    Osteoarthritis of spine with radiculopathy, lumbar region    SOB (shortness of breath)    Pain in right elbow    Stage 3a chronic kidney disease (HCC)    Senile osteoporosis    Primary osteoarthritis of right knee    History of ductal carcinoma in situ (DCIS) of right breast    History of left mastectomy    Status post right mastectomy     Past Medical History:   Diagnosis Date    Asthma     as a child    Breast cancer (HCC) 06/13/2019    Chronic kidney disease 2022    Esophagitis     GERD (gastroesophageal reflux disease)     Hypertension      Past Surgical History:   Procedure Laterality Date    BREAST LUMPECTOMY Left 1978    benign    DENTAL SURGERY      ESOPHAGOGASTRODUODENOSCOPY N/A 3/19/2018    Procedure: ESOPHAGOGASTRODUODENOSCOPY (EGD);  Surgeon: Farshad Jama MD;  Location: [facility];  Service: Gastroenterology    LYMPH NODE BIOPSY Right 3/28/2024    Procedure: LYMPHATIC MAPPING WITH BLUE AND RADIOACTIVE DYES, SENTINEL LYMPH NODE BIOPSY (INJECT AT 1000 BY DR THOMAS IN THE OR);  Surgeon: Ismael Thomas MD;  Location: [facility];  Service: Surgical Oncology    MAMMO STEREOTACTIC BREAST BIOPSY LEFT (ALL INC) Left 6/13/2019    MASTECTOMY Left 07/02/2019    MASTECTOMY W/ SENTINEL NODE BIOPSY Left 7/2/2019    Procedure: BREAST MASTECTOMY WITH SENTINEL LYMPH NODE BIOPSY, LYMPHATIC MAPPING WITH BLUE DYE AND RADIOACTIVE DYE (INJECT AT 0900 BY DR THOMAS IN THE OR);  Surgeon: Ismael Thomas MD;  [facility];  Service: Surgical Oncology    SIMPLE MASTECTOMY Right 3/28/2024    Procedure: BREAST MASTECTOMY;  Surgeon: Ismael Thomas MD;  [facility];  Service: Surgical Oncology    TONSILLECTOMY      US GUIDANCE BREAST BIOPSY RIGHT EACH ADDITIONAL Right 2/28/2024    US GUIDED BREAST BIOPSY LEFT COMPLETE Left 5/23/2019    US GUIDED BREAST BIOPSY RIGHT COMPLETE Right 2/28/2024      Family History   Problem Relation Age of Onset    Alzheimer's disease Mother     Dementia Mother     No Known Problems Father     No Known Problems Sister     No Known Problems Sister     Multiple sclerosis Sister     Multiple sclerosis Sister     No Known Problems Sister     No Known Problems Sister     Multiple sclerosis Daughter     No Known Problems Maternal Aunt     No Known Problems Maternal Aunt     No Known Problems Maternal Aunt     No Known Problems Maternal Aunt     No Known Problems Maternal Aunt     No Known Problems Maternal Aunt     No Known Problems Maternal Grandmother     No Known Problems Maternal Grandfather     No Known Problems Paternal Grandmother     No Known Problems Paternal Grandfather      Social History     Socioeconomic History    Marital status: /Civil Union     Spouse name: Not on file    Number of children: Not on file    Years of education: Not on file    Highest education level: Not on file   Occupational History    Not on file   Tobacco Use    Smoking status: Former     Current packs/day: 0.00     Average packs/day: 0.5 packs/day for 20.0 years (10.0 ttl pk-yrs)     Types: Cigarettes     Start date:      Quit date:      Years since quittin.7    Smokeless tobacco: Never   Vaping Use    Vaping status: Never Used   Substance and Sexual Activity    Alcohol use: No    Drug use: No    Sexual activity: Not Currently     Partners: Male     Birth control/protection: None   Other Topics Concern    Not on file   Social History Narrative    Not on file     Social Determinants of Health     Financial Resource Strain: Low Risk  (2023)    Overall Financial Resource Strain (CARDIA)     Difficulty of Paying Living Expenses: Not hard at all   Food Insecurity: Not on file   Transportation Needs: No Transportation Needs (2023)    PRAPARE - Transportation     Lack of Transportation (Medical): No     Lack of Transportation (Non-Medical): No   Physical Activity: Not  on file   Stress: Not on file   Social Connections: Not on file   Intimate Partner Violence: Not on file   Housing Stability: Not on file       Current Outpatient Medications:     acetaminophen (TYLENOL) 325 mg tablet, Take 2 tablets (650 mg total) by mouth every 4 (four) hours as needed for mild pain, Disp: , Rfl:     amLODIPine (NORVASC) 5 mg tablet, Take 1 tablet (5 mg total) by mouth daily, Disp: 90 tablet, Rfl: 2    Ascorbic Acid (vitamin C) 1000 MG tablet, Take 1,000 mg by mouth daily Do not start before March 1, 2024., Disp: , Rfl:     Cholecalciferol (VITAMIN D PO), Take 1,000 Units by mouth daily Do not start before March 1, 2024., Disp: , Rfl:     oxyCODONE-acetaminophen (Percocet) 5-325 mg per tablet, Take 1 tablet by mouth every 6 (six) hours as needed for moderate pain for up to 10 doses Max Daily Amount: 4 tablets (Patient not taking: Reported on 4/12/2024), Disp: 10 tablet, Rfl: 0  Allergies   Allergen Reactions    Hydrochlorothiazide Other (See Comments)     Kidney number goes up    Lisinopril     Losartan Other (See Comments)     Kidney number goes up    Rosuvastatin Nausea Only     Vitals:    10/16/24 1237   BP: 142/80   Pulse: 75   Temp: 97.9 °F (36.6 °C)   SpO2: 98%       Physical Exam  Vitals reviewed.   Constitutional:       General: She is not in acute distress.     Appearance: Normal appearance. She is not ill-appearing or toxic-appearing.   HENT:      Head: Normocephalic and atraumatic.      Nose: Nose normal.      Mouth/Throat:      Mouth: Mucous membranes are moist.   Eyes:      General: No scleral icterus.  Cardiovascular:      Rate and Rhythm: Normal rate.   Pulmonary:      Effort: Pulmonary effort is normal.   Chest:   Breasts:     Right: Absent.      Left: Absent.          Comments: Chest wall is smooth, without any underlying masses or nodules. I do not appreciate any skin changes, tenderness or adenopathy.  Musculoskeletal:         General: No swelling or tenderness. Normal range of  motion.      Cervical back: Normal range of motion and neck supple.   Lymphadenopathy:      Cervical: No cervical adenopathy.      Upper Body:      Right upper body: No supraclavicular, axillary or pectoral adenopathy.      Left upper body: No supraclavicular, axillary or pectoral adenopathy.   Skin:     General: Skin is warm and dry.      Coloration: Skin is not jaundiced.      Findings: No erythema.   Neurological:      General: No focal deficit present.      Mental Status: She is alert and oriented to person, place, and time.   Psychiatric:         Mood and Affect: Mood normal.         Behavior: Behavior normal.         Thought Content: Thought content normal.         Judgment: Judgment normal.

## 2024-11-26 ENCOUNTER — OFFICE VISIT (OUTPATIENT)
Dept: INTERNAL MEDICINE CLINIC | Facility: CLINIC | Age: 84
End: 2024-11-26
Payer: MEDICARE

## 2024-11-26 VITALS
TEMPERATURE: 98.1 F | DIASTOLIC BLOOD PRESSURE: 92 MMHG | WEIGHT: 174 LBS | SYSTOLIC BLOOD PRESSURE: 154 MMHG | BODY MASS INDEX: 27.97 KG/M2 | HEIGHT: 66 IN | HEART RATE: 72 BPM | OXYGEN SATURATION: 98 %

## 2024-11-26 DIAGNOSIS — Z86.000 HISTORY OF DUCTAL CARCINOMA IN SITU (DCIS) OF RIGHT BREAST: ICD-10-CM

## 2024-11-26 DIAGNOSIS — E53.8 B12 DEFICIENCY: ICD-10-CM

## 2024-11-26 DIAGNOSIS — N18.31 STAGE 3A CHRONIC KIDNEY DISEASE (HCC): ICD-10-CM

## 2024-11-26 DIAGNOSIS — Z86.000 HISTORY OF DUCTAL CARCINOMA IN SITU (DCIS) OF LEFT BREAST: ICD-10-CM

## 2024-11-26 DIAGNOSIS — Z13.820 SCREENING FOR OSTEOPOROSIS: ICD-10-CM

## 2024-11-26 DIAGNOSIS — E78.2 MIXED HYPERLIPIDEMIA: ICD-10-CM

## 2024-11-26 DIAGNOSIS — R73.01 IMPAIRED FASTING BLOOD SUGAR: ICD-10-CM

## 2024-11-26 DIAGNOSIS — Z00.00 MEDICARE ANNUAL WELLNESS VISIT, SUBSEQUENT: ICD-10-CM

## 2024-11-26 DIAGNOSIS — R05.8 OTHER COUGH: ICD-10-CM

## 2024-11-26 DIAGNOSIS — I10 ESSENTIAL HYPERTENSION, BENIGN: Primary | ICD-10-CM

## 2024-11-26 PROCEDURE — 99214 OFFICE O/P EST MOD 30 MIN: CPT | Performed by: INTERNAL MEDICINE

## 2024-11-26 PROCEDURE — G0439 PPPS, SUBSEQ VISIT: HCPCS | Performed by: INTERNAL MEDICINE

## 2024-11-26 RX ORDER — DEXTROMETHORPHAN HYDROBROMIDE AND PROMETHAZINE HYDROCHLORIDE 15; 6.25 MG/5ML; MG/5ML
5 SYRUP ORAL 4 TIMES DAILY PRN
Qty: 150 ML | Refills: 0 | Status: SHIPPED | OUTPATIENT
Start: 2024-11-26

## 2024-11-26 RX ORDER — AMLODIPINE BESYLATE 5 MG/1
5 TABLET ORAL DAILY
Qty: 90 TABLET | Refills: 2 | Status: SHIPPED | OUTPATIENT
Start: 2024-11-26

## 2024-11-26 NOTE — ASSESSMENT & PLAN NOTE
Lab Results   Component Value Date    EGFR 61 03/20/2024    EGFR 62 05/17/2023    EGFR 59 11/17/2022    CREATININE 0.88 03/20/2024    CREATININE 0.88 05/17/2023    CREATININE 0.91 11/17/2022

## 2024-11-26 NOTE — ASSESSMENT & PLAN NOTE
Orders:    amLODIPine (NORVASC) 5 mg tablet; Take 1 tablet (5 mg total) by mouth daily    CBC and differential; Future    Comprehensive metabolic panel; Future    Lipid Panel with Direct LDL reflex; Future    TSH, 3rd generation; Future

## 2024-11-26 NOTE — PROGRESS NOTES
Name: Eleni Chew      : 1940      MRN: [id number]  Encounter Provider: Judson Grant MD  Encounter Date: 2024   Encounter department: [facility] INTERNAL MEDICINE    Assessment & Plan  Essential hypertension, benign    Orders:    amLODIPine (NORVASC) 5 mg tablet; Take 1 tablet (5 mg total) by mouth daily    CBC and differential; Future    Comprehensive metabolic panel; Future    Lipid Panel with Direct LDL reflex; Future    TSH, 3rd generation; Future    History of ductal carcinoma in situ (DCIS) of right breast         Screening for osteoporosis         History of ductal carcinoma in situ (DCIS) of left breast         Mixed hyperlipidemia    Orders:    Comprehensive metabolic panel; Future    Lipid Panel with Direct LDL reflex; Future    TSH, 3rd generation; Future    Impaired fasting blood sugar    Orders:    Hemoglobin A1C; Future    Medicare annual wellness visit, subsequent         Other cough    Orders:    promethazine-dextromethorphan (PHENERGAN-DM) 6.25-15 mg/5 mL oral syrup; Take 5 mL by mouth 4 (four) times a day as needed for cough    Stage 3a chronic kidney disease (HCC)  Lab Results   Component Value Date    EGFR 61 2024    EGFR 62 2023    EGFR 59 2022    CREATININE 0.88 2024    CREATININE 0.88 2023    CREATININE 0.91 2022            B12 deficiency    Orders:    Vitamin B12; Future      Depression Screening and Follow-up Plan: Patient was screened for depression during today's encounter. They screened negative with a PHQ-2 score of 0.    Urinary Incontinence Plan of Care: counseling topics discussed: use restroom every 2 hours and limit alcohol, caffeine, spicy foods, and acidic foods.       Preventive health issues were discussed with patient, and age appropriate screening tests were ordered as noted in patient's After Visit Summary. Personalized health advice and appropriate referrals for health education or preventive services given if  needed, as noted in patient's After Visit Summary.    History of Present Illness     Follow-up on multiple medical problems with they are stable, also medical wellness exam       Patient Care Team:  Judson Grant MD as PCP - General (Internal Medicine)  Ismael Thomas MD as Surgeon (Surgical Oncology)  Allie Arshad RN as Nurse Navigator (Oncology)  MAGNOLIA Sullivan as Nurse Practitioner (Surgical Oncology)    Review of Systems   Constitutional:  Negative for chills and fever.   HENT:  Negative for congestion, ear pain and sore throat.    Eyes:  Negative for pain.   Respiratory:  Negative for cough and shortness of breath.    Cardiovascular:  Negative for chest pain and leg swelling.   Gastrointestinal:  Negative for abdominal pain, nausea and vomiting.   Endocrine: Negative for polyuria.   Genitourinary:  Negative for difficulty urinating, frequency and urgency.   Musculoskeletal:  Positive for arthralgias and back pain.   Skin:  Negative for rash.   Neurological:  Negative for weakness and headaches.   Psychiatric/Behavioral:  Negative for sleep disturbance. The patient is not nervous/anxious.      Medical History Reviewed by provider this encounter:  Tobacco  Allergies  Meds  Problems  Med Hx  Surg Hx  Fam Hx       Annual Wellness Visit Questionnaire   Eleni is here for her Subsequent Wellness visit. Last Medicare Wellness visit information reviewed, patient interviewed and updates made to the record today.      Health Risk Assessment:   Patient rates overall health as good. Patient feels that their physical health rating is same. Patient is satisfied with their life. Eyesight was rated as same. Hearing was rated as same. Patient feels that their emotional and mental health rating is same. Patient states they are sometimes unusually tired/fatigued. Pain experienced in the last 7 days has been some. Patient's pain rating has been 5/10. Patient states that she has experienced no weight loss or gain in  last 6 months.     Depression Screening:   PHQ-2 Score: 0      Fall Risk Screening:   In the past year, patient has experienced: no history of falling in past year      Urinary Incontinence Screening:   Patient has leaked urine accidently in the last six months.     Home Safety:  Patient does not have trouble with stairs inside or outside of their home. Patient has working smoke alarms and has working carbon monoxide detector.     Nutrition:   Current diet is Low Cholesterol and No Added Salt.     Medications:   Patient is currently taking over-the-counter supplements. OTC medications include: see medication list. Patient is able to manage medications.     Activities of Daily Living (ADLs)/Instrumental Activities of Daily Living (IADLs):   Walk and transfer into and out of bed and chair?: Yes  Dress and groom yourself?: Yes    Bathe or shower yourself?: Yes    Feed yourself? Yes  Do your laundry/housekeeping?: Yes  Manage your money, pay your bills and track your expenses?: Yes  Make your own meals?: Yes    Do your own shopping?: Yes    Previous Hospitalizations:   Any hospitalizations or ED visits within the last 12 months?: Yes    How many hospitalizations have you had in the last year?: 1-2    Advance Care Planning:   Living will: No    Durable POA for healthcare: No    Advanced directive: No    ACP document given: Yes      Cognitive Screening:   Provider or family/friend/caregiver concerned regarding cognition?: No    PREVENTIVE SCREENINGS      Cardiovascular Screening:    General: Screening Not Indicated and History Lipid Disorder      Diabetes Screening:     General: Screening Current      Breast Cancer Screening:     General: Screening Not Indicated and History Breast Cancer      Cervical Cancer Screening:    General: Screening Not Indicated      Osteoporosis Screening:    General: Screening Not Indicated and History Osteoporosis      Lung Cancer Screening:     General: Screening Not Indicated    Screening,  "Brief Intervention, and Referral to Treatment (SBIRT)    Screening  Typical number of drinks in a day: 0  Typical number of drinks in a week: 0  Interpretation: Low risk drinking behavior.    Single Item Drug Screening:  How often have you used an illegal drug (including marijuana) or a prescription medication for non-medical reasons in the past year? never    Single Item Drug Screen Score: 0  Interpretation: Negative screen for possible drug use disorder    Social Drivers of Health     Financial Resource Strain: Low Risk  (11/22/2023)    Overall Financial Resource Strain (CARDIA)     Difficulty of Paying Living Expenses: Not hard at all   Food Insecurity: No Food Insecurity (11/26/2024)    Hunger Vital Sign     Worried About Running Out of Food in the Last Year: Never true     Ran Out of Food in the Last Year: Never true   Transportation Needs: No Transportation Needs (11/26/2024)    PRAPARE - Transportation     Lack of Transportation (Medical): No     Lack of Transportation (Non-Medical): No   Housing Stability: Low Risk  (11/26/2024)    Housing Stability Vital Sign     Unable to Pay for Housing in the Last Year: No     Number of Times Moved in the Last Year: 0     Homeless in the Last Year: No   Utilities: Not At Risk (11/26/2024)    [facility] Utilities     Threatened with loss of utilities: No     No results found.    Objective   /92 (BP Location: Left arm, Patient Position: Sitting, Cuff Size: Standard)   Pulse 72   Temp 98.1 °F (36.7 °C) (Temporal)   Ht 5' 6\" (1.676 m)   Wt 78.9 kg (174 lb)   SpO2 98%   BMI 28.08 kg/m²     Physical Exam  Vitals and nursing note reviewed.   Constitutional:       General: She is not in acute distress.     Appearance: She is well-developed.   HENT:      Head: Normocephalic and atraumatic.      Right Ear: Tympanic membrane, ear canal and external ear normal.      Left Ear: Tympanic membrane, ear canal and external ear normal.      Mouth/Throat:      Mouth: Mucous membranes " are moist.      Pharynx: Oropharynx is clear.   Eyes:      Extraocular Movements: Extraocular movements intact.      Conjunctiva/sclera: Conjunctivae normal.   Cardiovascular:      Rate and Rhythm: Normal rate and regular rhythm.      Heart sounds: Normal heart sounds. No murmur heard.  Pulmonary:      Effort: Pulmonary effort is normal. No respiratory distress.      Breath sounds: Normal breath sounds. No wheezing or rales.   Abdominal:      General: Abdomen is flat. There is no distension.      Palpations: Abdomen is soft.      Tenderness: There is no abdominal tenderness.   Musculoskeletal:         General: No swelling.      Cervical back: Neck supple.      Right lower leg: No edema.      Left lower leg: No edema.   Skin:     General: Skin is warm and dry.      Capillary Refill: Capillary refill takes less than 2 seconds.   Neurological:      General: No focal deficit present.      Mental Status: She is alert and oriented to person, place, and time.   Psychiatric:         Mood and Affect: Mood normal.

## 2025-02-26 ENCOUNTER — RA CDI HCC (OUTPATIENT)
Dept: OTHER | Facility: HOSPITAL | Age: 85
End: 2025-02-26

## (undated) DEVICE — DRAPE EQUIPMENT RF WAND

## (undated) DEVICE — PENCIL ELECTROSURG E-Z CLEAN -0035H

## (undated) DEVICE — ADHESIVE SKIN HIGH VISCOSITY EXOFIN 1ML

## (undated) DEVICE — TUBING SUCTION 5MM X 12 FT

## (undated) DEVICE — SUT SILK 2-0 SH 30 IN K833H

## (undated) DEVICE — CHEST/BREAST DRAPE: Brand: CONVERTORS

## (undated) DEVICE — BETHLEHEM UNIVERSAL MINOR GEN: Brand: CARDINAL HEALTH

## (undated) DEVICE — SUT VICRYL 2-0 SH 27 IN UNDYED J417H

## (undated) DEVICE — 3M™ STERI-STRIP™ REINFORCED ADHESIVE SKIN CLOSURES, R1547, 1/2 IN X 4 IN (12 MM X 100 MM), 6 STRIPS/ENVELOPE: Brand: 3M™ STERI-STRIP™

## (undated) DEVICE — INTENDED FOR TISSUE SEPARATION, AND OTHER PROCEDURES THAT REQUIRE A SHARP SURGICAL BLADE TO PUNCTURE OR CUT.: Brand: BARD-PARKER SAFETY BLADES SIZE 15, STERILE

## (undated) DEVICE — JP CHANNEL DRAIN 19FR, FULL FLUTES: Brand: JACKSON-PRATT

## (undated) DEVICE — BETHLEHEM UNIVERSAL BREAST PK: Brand: CARDINAL HEALTH

## (undated) DEVICE — NEEDLE 25G X 1 1/2

## (undated) DEVICE — ELECTRODE BLADE MOD  E-Z CLEAN 6.5IN -0014M

## (undated) DEVICE — CHLORAPREP HI-LITE 26ML ORANGE

## (undated) DEVICE — SUT PDS II 4-0 PS-2 18 IN Z496G

## (undated) DEVICE — JACKSON-PRATT 100CC BULB RESERVOIR: Brand: CARDINAL HEALTH

## (undated) DEVICE — SMOKE EVACUATION TUBING WITH 8 IN INTEGRAL WAND AND SPONGE GUARD: Brand: BUFFALO FILTER

## (undated) DEVICE — VIAL DECANTER

## (undated) DEVICE — MEDI-VAC YANK SUCT HNDL W/TPRD BULBOUS TIP: Brand: CARDINAL HEALTH

## (undated) DEVICE — SUT MONOCRYL 4-0 PS-2 18 IN Y496G

## (undated) DEVICE — GAUZE SPONGES,USP TYPE VII GAUZE, 12 PLY: Brand: CURITY

## (undated) DEVICE — ADHESIVE SKN CLSR HISTOACRYL FLEX 0.5ML LF

## (undated) DEVICE — GLOVE SRG BIOGEL 7

## (undated) DEVICE — SUT MONOCRYL 3-0 PS-2 18 IN Y497G

## (undated) DEVICE — DRAIN SPONGES,6 PLY: Brand: EXCILON

## (undated) DEVICE — DECANTER: Brand: UNBRANDED

## (undated) DEVICE — PROVE COVER: Brand: UNBRANDED

## (undated) DEVICE — LIGACLIP MCA MULTIPLE CLIP APPLIERS, 20 SMALL CLIPS: Brand: LIGACLIP

## (undated) DEVICE — LIGHT HANDLE COVER SLEEVE DISP BLUE STELLAR